# Patient Record
Sex: FEMALE | Race: WHITE | Employment: OTHER
[De-identification: names, ages, dates, MRNs, and addresses within clinical notes are randomized per-mention and may not be internally consistent; named-entity substitution may affect disease eponyms.]

---

## 2017-02-03 ENCOUNTER — OFFICE VISIT (OUTPATIENT)
Dept: FAMILY MEDICINE CLINIC | Facility: CLINIC | Age: 67
End: 2017-02-03

## 2017-02-03 VITALS
HEIGHT: 63 IN | DIASTOLIC BLOOD PRESSURE: 78 MMHG | BODY MASS INDEX: 36.64 KG/M2 | RESPIRATION RATE: 18 BRPM | WEIGHT: 206.8 LBS | HEART RATE: 68 BPM | SYSTOLIC BLOOD PRESSURE: 138 MMHG

## 2017-02-03 DIAGNOSIS — M85.80 OSTEOPENIA: Primary | ICD-10-CM

## 2017-02-03 DIAGNOSIS — Z11.59 NEED FOR HEPATITIS C SCREENING TEST: ICD-10-CM

## 2017-02-03 DIAGNOSIS — Z11.4 SCREENING FOR HIV WITHOUT PRESENCE OF RISK FACTORS: ICD-10-CM

## 2017-02-03 DIAGNOSIS — I10 ESSENTIAL HYPERTENSION, BENIGN: ICD-10-CM

## 2017-02-03 DIAGNOSIS — Z13.820 SCREENING FOR OSTEOPOROSIS: ICD-10-CM

## 2017-02-03 DIAGNOSIS — E78.00 PURE HYPERCHOLESTEROLEMIA: ICD-10-CM

## 2017-02-03 DIAGNOSIS — E11.9 DM TYPE 2, GOAL HBA1C < 7% (HCC): ICD-10-CM

## 2017-02-03 DIAGNOSIS — E28.39 ESTROGEN DEFICIENCY: ICD-10-CM

## 2017-02-03 DIAGNOSIS — Z00.00 ROUTINE GENERAL MEDICAL EXAMINATION AT A HEALTH CARE FACILITY: ICD-10-CM

## 2017-02-03 PROCEDURE — G0402 INITIAL PREVENTIVE EXAM: HCPCS | Performed by: NURSE PRACTITIONER

## 2017-02-03 RX ORDER — ATORVASTATIN CALCIUM 20 MG/1
20 TABLET, FILM COATED ORAL DAILY
Qty: 90 TABLET | Refills: 1 | Status: SHIPPED | OUTPATIENT
Start: 2017-02-03 | End: 2017-08-18 | Stop reason: SDUPTHER

## 2017-02-03 RX ORDER — LOSARTAN POTASSIUM AND HYDROCHLOROTHIAZIDE 25; 100 MG/1; MG/1
1 TABLET ORAL DAILY
Qty: 90 TABLET | Refills: 1 | Status: SHIPPED | OUTPATIENT
Start: 2017-02-03 | End: 2017-08-22 | Stop reason: SDUPTHER

## 2017-02-03 ASSESSMENT — LIFESTYLE VARIABLES
HOW MANY STANDARD DRINKS CONTAINING ALCOHOL DO YOU HAVE ON A TYPICAL DAY: 0
HAS A RELATIVE, FRIEND, DOCTOR, OR ANOTHER HEALTH PROFESSIONAL EXPRESSED CONCERN ABOUT YOUR DRINKING OR SUGGESTED YOU CUT DOWN: 0
HAVE YOU OR SOMEONE ELSE BEEN INJURED AS A RESULT OF YOUR DRINKING: 0
HOW OFTEN DO YOU HAVE A DRINK CONTAINING ALCOHOL: 1
HOW OFTEN DURING THE LAST YEAR HAVE YOU BEEN UNABLE TO REMEMBER WHAT HAPPENED THE NIGHT BEFORE BECAUSE YOU HAD BEEN DRINKING: 0
HOW OFTEN DURING THE LAST YEAR HAVE YOU NEEDED AN ALCOHOLIC DRINK FIRST THING IN THE MORNING TO GET YOURSELF GOING AFTER A NIGHT OF HEAVY DRINKING: 0
HOW OFTEN DO YOU HAVE SIX OR MORE DRINKS ON ONE OCCASION: 0
HOW OFTEN DURING THE LAST YEAR HAVE YOU FAILED TO DO WHAT WAS NORMALLY EXPECTED FROM YOU BECAUSE OF DRINKING: 0
AUDIT TOTAL SCORE: 1
AUDIT-C TOTAL SCORE: 1
HOW OFTEN DURING THE LAST YEAR HAVE YOU FOUND THAT YOU WERE NOT ABLE TO STOP DRINKING ONCE YOU HAD STARTED: 0
HOW OFTEN DURING THE LAST YEAR HAVE YOU HAD A FEELING OF GUILT OR REMORSE AFTER DRINKING: 0

## 2017-02-03 ASSESSMENT — ANXIETY QUESTIONNAIRES: GAD7 TOTAL SCORE: 3

## 2017-02-03 ASSESSMENT — PATIENT HEALTH QUESTIONNAIRE - PHQ9: SUM OF ALL RESPONSES TO PHQ QUESTIONS 1-9: 2

## 2017-03-01 ENCOUNTER — TELEPHONE (OUTPATIENT)
Dept: FAMILY MEDICINE CLINIC | Facility: CLINIC | Age: 67
End: 2017-03-01

## 2017-05-01 ENCOUNTER — OFFICE VISIT (OUTPATIENT)
Dept: FAMILY MEDICINE CLINIC | Age: 67
End: 2017-05-01
Payer: MEDICARE

## 2017-05-01 ENCOUNTER — HOSPITAL ENCOUNTER (OUTPATIENT)
Age: 67
Setting detail: SPECIMEN
Discharge: HOME | End: 2017-05-01
Payer: MEDICARE

## 2017-05-01 VITALS
HEIGHT: 63 IN | RESPIRATION RATE: 18 BRPM | HEART RATE: 72 BPM | BODY MASS INDEX: 36.68 KG/M2 | SYSTOLIC BLOOD PRESSURE: 128 MMHG | DIASTOLIC BLOOD PRESSURE: 80 MMHG | WEIGHT: 207 LBS

## 2017-05-01 DIAGNOSIS — E11.9 TYPE 2 DIABETES, HBA1C GOAL < 7% (HCC): Primary | ICD-10-CM

## 2017-05-01 DIAGNOSIS — F41.9 ANXIETY AND DEPRESSION: ICD-10-CM

## 2017-05-01 DIAGNOSIS — F32.A ANXIETY AND DEPRESSION: ICD-10-CM

## 2017-05-01 LAB
GLUCOSE BLD-MCNC: 187 MG/DL
HBA1C MFR BLD: 7.4 %

## 2017-05-01 PROCEDURE — 99213 OFFICE O/P EST LOW 20 MIN: CPT | Performed by: NURSE PRACTITIONER

## 2017-05-01 PROCEDURE — 82043 UR ALBUMIN QUANTITATIVE: CPT

## 2017-05-01 PROCEDURE — 82962 GLUCOSE BLOOD TEST: CPT | Performed by: NURSE PRACTITIONER

## 2017-05-01 PROCEDURE — 83036 HEMOGLOBIN GLYCOSYLATED A1C: CPT | Performed by: NURSE PRACTITIONER

## 2017-05-01 PROCEDURE — 82570 ASSAY OF URINE CREATININE: CPT

## 2017-05-01 RX ORDER — VENLAFAXINE HYDROCHLORIDE 75 MG/1
150 CAPSULE, EXTENDED RELEASE ORAL DAILY
Qty: 90 CAPSULE | Refills: 1 | Status: CANCELLED | OUTPATIENT
Start: 2017-05-01

## 2017-05-01 RX ORDER — VENLAFAXINE HYDROCHLORIDE 75 MG/1
75 CAPSULE, EXTENDED RELEASE ORAL DAILY
Qty: 90 CAPSULE | Refills: 1 | Status: SHIPPED | OUTPATIENT
Start: 2017-05-01 | End: 2017-10-31 | Stop reason: SDUPTHER

## 2017-05-01 ASSESSMENT — ENCOUNTER SYMPTOMS
SORE THROAT: 0
RHINORRHEA: 0
EYES NEGATIVE: 1
SHORTNESS OF BREATH: 0
ABDOMINAL DISTENTION: 0
CHEST TIGHTNESS: 0

## 2017-05-02 LAB
CREATININE URINE: 148.9 MG/DL (ref 28–217)
MICROALBUMIN/CREAT 24H UR: <12 MG/L
MICROALBUMIN/CREAT UR-RTO: 8 MCG/MG CREAT

## 2017-05-03 ENCOUNTER — TELEPHONE (OUTPATIENT)
Dept: FAMILY MEDICINE CLINIC | Age: 67
End: 2017-05-03

## 2017-05-09 ENCOUNTER — TELEPHONE (OUTPATIENT)
Dept: FAMILY MEDICINE CLINIC | Age: 67
End: 2017-05-09

## 2017-05-09 DIAGNOSIS — E11.9 TYPE 2 DIABETES, HBA1C GOAL < 7% (HCC): Primary | ICD-10-CM

## 2017-05-12 ENCOUNTER — TELEPHONE (OUTPATIENT)
Dept: FAMILY MEDICINE CLINIC | Age: 67
End: 2017-05-12

## 2017-05-12 DIAGNOSIS — E11.9 TYPE 2 DIABETES, HBA1C GOAL < 7% (HCC): Primary | ICD-10-CM

## 2017-05-12 DIAGNOSIS — E11.9 TYPE 2 DIABETES MELLITUS WITHOUT COMPLICATION, UNSPECIFIED LONG TERM INSULIN USE STATUS: ICD-10-CM

## 2017-07-20 ENCOUNTER — TELEPHONE (OUTPATIENT)
Dept: FAMILY MEDICINE CLINIC | Age: 67
End: 2017-07-20

## 2017-07-20 DIAGNOSIS — K14.4 SMOOTH TONGUE: ICD-10-CM

## 2017-07-20 DIAGNOSIS — Z79.899 ENCOUNTER FOR MONITORING STATIN THERAPY: ICD-10-CM

## 2017-07-20 DIAGNOSIS — I10 ESSENTIAL HYPERTENSION, BENIGN: ICD-10-CM

## 2017-07-20 DIAGNOSIS — E11.9 TYPE 2 DIABETES, HBA1C GOAL < 7% (HCC): Primary | ICD-10-CM

## 2017-07-20 DIAGNOSIS — Z13.0 SCREENING, ANEMIA, DEFICIENCY, IRON: ICD-10-CM

## 2017-07-20 DIAGNOSIS — E55.9 VITAMIN D DEFICIENCY: ICD-10-CM

## 2017-07-20 DIAGNOSIS — Z51.81 ENCOUNTER FOR MONITORING STATIN THERAPY: ICD-10-CM

## 2017-07-20 DIAGNOSIS — Z13.29 SCREENING FOR THYROID DISORDER: ICD-10-CM

## 2017-07-20 DIAGNOSIS — E78.00 PURE HYPERCHOLESTEROLEMIA: ICD-10-CM

## 2017-07-26 ENCOUNTER — HOSPITAL ENCOUNTER (OUTPATIENT)
Age: 67
Discharge: HOME | End: 2017-07-26
Payer: MEDICARE

## 2017-07-26 DIAGNOSIS — E11.9 TYPE 2 DIABETES, HBA1C GOAL < 7% (HCC): ICD-10-CM

## 2017-07-26 DIAGNOSIS — Z13.0 SCREENING, ANEMIA, DEFICIENCY, IRON: ICD-10-CM

## 2017-07-26 DIAGNOSIS — Z13.29 SCREENING FOR THYROID DISORDER: ICD-10-CM

## 2017-07-26 DIAGNOSIS — Z79.899 ENCOUNTER FOR MONITORING STATIN THERAPY: ICD-10-CM

## 2017-07-26 DIAGNOSIS — Z51.81 ENCOUNTER FOR MONITORING STATIN THERAPY: ICD-10-CM

## 2017-07-26 DIAGNOSIS — K14.4 SMOOTH TONGUE: ICD-10-CM

## 2017-07-26 DIAGNOSIS — E55.9 VITAMIN D DEFICIENCY: ICD-10-CM

## 2017-07-26 DIAGNOSIS — E78.00 PURE HYPERCHOLESTEROLEMIA: ICD-10-CM

## 2017-07-26 DIAGNOSIS — I10 ESSENTIAL HYPERTENSION, BENIGN: ICD-10-CM

## 2017-07-26 LAB
ABSOLUTE EOS #: 0.5 K/UL (ref 0–0.4)
ABSOLUTE LYMPH #: 2.5 K/UL (ref 1.1–2.7)
ABSOLUTE MONO #: 0.5 K/UL (ref 0–1)
ALBUMIN SERPL-MCNC: 3.8 G/DL (ref 3.5–5.2)
ALBUMIN/GLOBULIN RATIO: ABNORMAL (ref 1–2.5)
ALP BLD-CCNC: 113 U/L (ref 35–104)
ALT SERPL-CCNC: 15 U/L (ref 5–33)
ANION GAP SERPL CALCULATED.3IONS-SCNC: 16 MMOL/L (ref 9–17)
AST SERPL-CCNC: 13 U/L
BASOPHILS # BLD: 1 %
BASOPHILS ABSOLUTE: 0.1 K/UL (ref 0–0.2)
BILIRUB SERPL-MCNC: 0.23 MG/DL (ref 0.3–1.2)
BUN BLDV-MCNC: 17 MG/DL (ref 8–23)
BUN/CREAT BLD: 25 (ref 9–20)
CALCIUM SERPL-MCNC: 9.2 MG/DL (ref 8.6–10.4)
CHLORIDE BLD-SCNC: 100 MMOL/L (ref 98–107)
CHOLESTEROL/HDL RATIO: 3.9
CHOLESTEROL: 146 MG/DL
CO2: 24 MMOL/L (ref 20–31)
CREAT SERPL-MCNC: 0.68 MG/DL (ref 0.5–0.9)
DIFFERENTIAL TYPE: YES
EOSINOPHILS RELATIVE PERCENT: 5 %
ESTIMATED AVERAGE GLUCOSE: 146 MG/DL
FOLATE: 19.6 NG/ML
GFR AFRICAN AMERICAN: >60 ML/MIN
GFR NON-AFRICAN AMERICAN: >60 ML/MIN
GFR SERPL CREATININE-BSD FRML MDRD: ABNORMAL ML/MIN/{1.73_M2}
GFR SERPL CREATININE-BSD FRML MDRD: ABNORMAL ML/MIN/{1.73_M2}
GLUCOSE BLD-MCNC: 149 MG/DL (ref 70–99)
HBA1C MFR BLD: 6.7 % (ref 4.8–5.9)
HCT VFR BLD CALC: 37.8 % (ref 36–46)
HDLC SERPL-MCNC: 37 MG/DL
HEMOGLOBIN: 12.4 G/DL (ref 12–16)
LDL CHOLESTEROL: 83 MG/DL (ref 0–130)
LYMPHOCYTES # BLD: 23 %
MCH RBC QN AUTO: 25.5 PG (ref 26–34)
MCHC RBC AUTO-ENTMCNC: 32.9 G/DL (ref 31–37)
MCV RBC AUTO: 77.3 FL (ref 80–100)
MONOCYTES # BLD: 5 %
PATIENT FASTING?: YES
PDW BLD-RTO: 15.2 % (ref 12.1–15.2)
PLATELET # BLD: 337 K/UL (ref 140–450)
PLATELET ESTIMATE: ABNORMAL
PMV BLD AUTO: ABNORMAL FL (ref 6–12)
POTASSIUM SERPL-SCNC: 3.8 MMOL/L (ref 3.7–5.3)
RBC # BLD: 4.88 M/UL (ref 4–5.2)
RBC # BLD: ABNORMAL 10*6/UL
SEG NEUTROPHILS: 66 %
SEGMENTED NEUTROPHILS ABSOLUTE COUNT: 7.5 K/UL (ref 2.5–7)
SODIUM BLD-SCNC: 140 MMOL/L (ref 135–144)
TOTAL PROTEIN: 7 G/DL (ref 6.4–8.3)
TRIGL SERPL-MCNC: 132 MG/DL
TSH SERPL DL<=0.05 MIU/L-ACNC: 3.58 MIU/L (ref 0.3–5)
VITAMIN B-12: 406 PG/ML (ref 211–946)
VITAMIN D 25-HYDROXY: 14.2 NG/ML (ref 30–100)
VLDLC SERPL CALC-MCNC: ABNORMAL MG/DL (ref 1–30)
WBC # BLD: 11 K/UL (ref 3.5–11)
WBC # BLD: ABNORMAL 10*3/UL

## 2017-07-26 PROCEDURE — 84443 ASSAY THYROID STIM HORMONE: CPT

## 2017-07-26 PROCEDURE — 80053 COMPREHEN METABOLIC PANEL: CPT

## 2017-07-26 PROCEDURE — 82607 VITAMIN B-12: CPT

## 2017-07-26 PROCEDURE — 36415 COLL VENOUS BLD VENIPUNCTURE: CPT

## 2017-07-26 PROCEDURE — 85025 COMPLETE CBC W/AUTO DIFF WBC: CPT

## 2017-07-26 PROCEDURE — 82746 ASSAY OF FOLIC ACID SERUM: CPT

## 2017-07-26 PROCEDURE — 82306 VITAMIN D 25 HYDROXY: CPT

## 2017-07-26 PROCEDURE — 83036 HEMOGLOBIN GLYCOSYLATED A1C: CPT

## 2017-07-26 PROCEDURE — 80061 LIPID PANEL: CPT

## 2017-07-31 ENCOUNTER — OFFICE VISIT (OUTPATIENT)
Dept: FAMILY MEDICINE CLINIC | Age: 67
End: 2017-07-31
Payer: MEDICARE

## 2017-07-31 VITALS
RESPIRATION RATE: 18 BRPM | SYSTOLIC BLOOD PRESSURE: 130 MMHG | HEART RATE: 68 BPM | BODY MASS INDEX: 35.97 KG/M2 | HEIGHT: 63 IN | WEIGHT: 203 LBS | DIASTOLIC BLOOD PRESSURE: 70 MMHG

## 2017-07-31 DIAGNOSIS — I10 ESSENTIAL HYPERTENSION, BENIGN: ICD-10-CM

## 2017-07-31 DIAGNOSIS — E78.00 PURE HYPERCHOLESTEROLEMIA: ICD-10-CM

## 2017-07-31 DIAGNOSIS — Z12.31 ENCOUNTER FOR SCREENING MAMMOGRAM FOR BREAST CANCER: ICD-10-CM

## 2017-07-31 DIAGNOSIS — E11.9 TYPE 2 DIABETES, HBA1C GOAL < 7% (HCC): Primary | ICD-10-CM

## 2017-07-31 PROCEDURE — 99214 OFFICE O/P EST MOD 30 MIN: CPT | Performed by: NURSE PRACTITIONER

## 2017-07-31 ASSESSMENT — ENCOUNTER SYMPTOMS
EYES NEGATIVE: 1
BLURRED VISION: 0

## 2017-08-02 ENCOUNTER — HOSPITAL ENCOUNTER (OUTPATIENT)
Dept: DIABETES SERVICES | Age: 67
Discharge: HOME | End: 2017-08-02
Payer: MEDICARE

## 2017-08-02 VITALS
WEIGHT: 204.8 LBS | HEIGHT: 63 IN | BODY MASS INDEX: 36.29 KG/M2 | SYSTOLIC BLOOD PRESSURE: 139 MMHG | DIASTOLIC BLOOD PRESSURE: 84 MMHG

## 2017-08-02 PROCEDURE — G0108 DIAB MANAGE TRN  PER INDIV: HCPCS

## 2017-08-11 RX ORDER — LANCETS 30 GAUGE
EACH MISCELLANEOUS
Qty: 100 EACH | Refills: 3 | Status: SHIPPED | OUTPATIENT
Start: 2017-08-11 | End: 2018-08-27 | Stop reason: ALTCHOICE

## 2017-08-18 DIAGNOSIS — E78.00 PURE HYPERCHOLESTEROLEMIA: ICD-10-CM

## 2017-08-18 RX ORDER — ATORVASTATIN CALCIUM 20 MG/1
20 TABLET, FILM COATED ORAL DAILY
Qty: 90 TABLET | Refills: 1 | Status: SHIPPED | OUTPATIENT
Start: 2017-08-18 | End: 2018-01-29 | Stop reason: SDUPTHER

## 2017-08-22 DIAGNOSIS — I10 ESSENTIAL HYPERTENSION, BENIGN: ICD-10-CM

## 2017-08-22 RX ORDER — LOSARTAN POTASSIUM AND HYDROCHLOROTHIAZIDE 25; 100 MG/1; MG/1
1 TABLET ORAL DAILY
Qty: 90 TABLET | Refills: 1 | Status: SHIPPED | OUTPATIENT
Start: 2017-08-22 | End: 2018-01-29 | Stop reason: ALTCHOICE

## 2017-08-24 ENCOUNTER — HOSPITAL ENCOUNTER (OUTPATIENT)
Dept: MAMMOGRAPHY | Age: 67
Discharge: HOME | End: 2017-08-24
Payer: MEDICARE

## 2017-08-24 DIAGNOSIS — Z12.31 ENCOUNTER FOR SCREENING MAMMOGRAM FOR BREAST CANCER: Primary | ICD-10-CM

## 2017-08-24 DIAGNOSIS — Z12.31 ENCOUNTER FOR SCREENING MAMMOGRAM FOR BREAST CANCER: ICD-10-CM

## 2017-08-24 PROCEDURE — G0202 SCR MAMMO BI INCL CAD: HCPCS

## 2017-08-25 ENCOUNTER — TELEPHONE (OUTPATIENT)
Dept: FAMILY MEDICINE CLINIC | Age: 67
End: 2017-08-25

## 2017-08-25 ENCOUNTER — HOSPITAL ENCOUNTER (OUTPATIENT)
Dept: NUTRITION | Age: 67
Discharge: HOME | End: 2017-08-25
Payer: MEDICARE

## 2017-08-25 PROCEDURE — 97802 MEDICAL NUTRITION INDIV IN: CPT

## 2017-09-11 ENCOUNTER — HOSPITAL ENCOUNTER (OUTPATIENT)
Dept: DIABETES SERVICES | Age: 67
Discharge: HOME | End: 2017-09-11
Payer: MEDICARE

## 2017-09-11 VITALS — BODY MASS INDEX: 35.07 KG/M2 | DIASTOLIC BLOOD PRESSURE: 82 MMHG | WEIGHT: 198 LBS | SYSTOLIC BLOOD PRESSURE: 162 MMHG

## 2017-09-11 PROCEDURE — G0108 DIAB MANAGE TRN  PER INDIV: HCPCS

## 2017-10-31 ENCOUNTER — NURSE ONLY (OUTPATIENT)
Dept: FAMILY MEDICINE CLINIC | Age: 67
End: 2017-10-31
Payer: MEDICARE

## 2017-10-31 VITALS — WEIGHT: 191 LBS | HEIGHT: 63 IN | BODY MASS INDEX: 33.84 KG/M2

## 2017-10-31 DIAGNOSIS — F41.9 ANXIETY AND DEPRESSION: ICD-10-CM

## 2017-10-31 DIAGNOSIS — F32.A ANXIETY AND DEPRESSION: ICD-10-CM

## 2017-10-31 DIAGNOSIS — E11.9 TYPE 2 DIABETES, HBA1C GOAL < 7% (HCC): Primary | ICD-10-CM

## 2017-10-31 LAB — HBA1C MFR BLD: 6.2 %

## 2017-10-31 PROCEDURE — 83036 HEMOGLOBIN GLYCOSYLATED A1C: CPT | Performed by: NURSE PRACTITIONER

## 2017-10-31 RX ORDER — VENLAFAXINE HYDROCHLORIDE 75 MG/1
75 CAPSULE, EXTENDED RELEASE ORAL DAILY
Qty: 90 CAPSULE | Refills: 1 | Status: SHIPPED | OUTPATIENT
Start: 2017-10-31 | End: 2018-01-29 | Stop reason: SDUPTHER

## 2017-12-12 DIAGNOSIS — E11.9 TYPE 2 DIABETES MELLITUS WITHOUT COMPLICATION, UNSPECIFIED LONG TERM INSULIN USE STATUS: ICD-10-CM

## 2017-12-12 NOTE — TELEPHONE ENCOUNTER
From: Isac Norris  Sent: 12/12/2017 11:54 AM EST  Subject: Medication Renewal Request    Isac Norris would like a refill of the following medications:  metFORMIN (GLUCOPHAGE) 1000 MG tablet Donna Wu NP]    Preferred pharmacy: [facility] Euell Landau, [address] [place] [address] [phone] Filomena Mira [phone]    Comment:  . I need my metformin refilled next week so I have it over Sissy. . happy new year!...... thank you. ...janette Sheehan and happy new year!

## 2018-01-25 ENCOUNTER — HOSPITAL ENCOUNTER (OUTPATIENT)
Age: 68
Discharge: HOME | End: 2018-01-25
Payer: MEDICARE

## 2018-01-25 DIAGNOSIS — E11.9 TYPE 2 DIABETES, HBA1C GOAL < 7% (HCC): ICD-10-CM

## 2018-01-25 DIAGNOSIS — I10 ESSENTIAL HYPERTENSION, BENIGN: ICD-10-CM

## 2018-01-25 DIAGNOSIS — E78.00 PURE HYPERCHOLESTEROLEMIA: ICD-10-CM

## 2018-01-25 LAB
ANION GAP SERPL CALCULATED.3IONS-SCNC: 15 MMOL/L (ref 9–17)
BUN BLDV-MCNC: 19 MG/DL (ref 8–23)
BUN/CREAT BLD: 26 (ref 9–20)
CALCIUM SERPL-MCNC: 9.6 MG/DL (ref 8.6–10.4)
CHLORIDE BLD-SCNC: 100 MMOL/L (ref 98–107)
CHOLESTEROL/HDL RATIO: 4.4
CHOLESTEROL: 157 MG/DL
CO2: 24 MMOL/L (ref 20–31)
CREAT SERPL-MCNC: 0.72 MG/DL (ref 0.5–0.9)
ESTIMATED AVERAGE GLUCOSE: 137 MG/DL
GFR AFRICAN AMERICAN: >60 ML/MIN
GFR NON-AFRICAN AMERICAN: >60 ML/MIN
GFR SERPL CREATININE-BSD FRML MDRD: ABNORMAL ML/MIN/{1.73_M2}
GFR SERPL CREATININE-BSD FRML MDRD: ABNORMAL ML/MIN/{1.73_M2}
GLUCOSE BLD-MCNC: 152 MG/DL (ref 70–99)
HBA1C MFR BLD: 6.4 % (ref 4.8–5.9)
HDLC SERPL-MCNC: 36 MG/DL
LDL CHOLESTEROL: 90 MG/DL (ref 0–130)
PATIENT FASTING?: YES
POTASSIUM SERPL-SCNC: 3.6 MMOL/L (ref 3.7–5.3)
SODIUM BLD-SCNC: 139 MMOL/L (ref 135–144)
TRIGL SERPL-MCNC: 156 MG/DL
VLDLC SERPL CALC-MCNC: ABNORMAL MG/DL (ref 1–30)

## 2018-01-25 PROCEDURE — 80048 BASIC METABOLIC PNL TOTAL CA: CPT

## 2018-01-25 PROCEDURE — 36415 COLL VENOUS BLD VENIPUNCTURE: CPT

## 2018-01-25 PROCEDURE — 80061 LIPID PANEL: CPT

## 2018-01-25 PROCEDURE — 83036 HEMOGLOBIN GLYCOSYLATED A1C: CPT

## 2018-01-26 DIAGNOSIS — I10 ESSENTIAL HYPERTENSION: ICD-10-CM

## 2018-01-26 DIAGNOSIS — E87.6 HYPOKALEMIA: Primary | ICD-10-CM

## 2018-01-27 ENCOUNTER — HOSPITAL ENCOUNTER (OUTPATIENT)
Age: 68
Discharge: HOME | End: 2018-01-27
Payer: MEDICARE

## 2018-01-27 DIAGNOSIS — E87.6 HYPOKALEMIA: ICD-10-CM

## 2018-01-27 DIAGNOSIS — I10 ESSENTIAL HYPERTENSION: ICD-10-CM

## 2018-01-27 LAB
EKG ATRIAL RATE: 94 BPM
EKG P AXIS: 59 DEGREES
EKG P-R INTERVAL: 142 MS
EKG Q-T INTERVAL: 370 MS
EKG QRS DURATION: 96 MS
EKG QTC CALCULATION (BAZETT): 462 MS
EKG R AXIS: -37 DEGREES
EKG T AXIS: 30 DEGREES
EKG VENTRICULAR RATE: 94 BPM

## 2018-01-27 PROCEDURE — 93005 ELECTROCARDIOGRAM TRACING: CPT

## 2018-01-29 ENCOUNTER — OFFICE VISIT (OUTPATIENT)
Dept: FAMILY MEDICINE CLINIC | Age: 68
End: 2018-01-29
Payer: MEDICARE

## 2018-01-29 VITALS
OXYGEN SATURATION: 98 % | HEIGHT: 63 IN | HEART RATE: 106 BPM | BODY MASS INDEX: 34.38 KG/M2 | RESPIRATION RATE: 18 BRPM | SYSTOLIC BLOOD PRESSURE: 126 MMHG | DIASTOLIC BLOOD PRESSURE: 70 MMHG | WEIGHT: 194 LBS

## 2018-01-29 DIAGNOSIS — E11.9 TYPE 2 DIABETES MELLITUS WITHOUT COMPLICATION, UNSPECIFIED LONG TERM INSULIN USE STATUS: ICD-10-CM

## 2018-01-29 DIAGNOSIS — E78.00 PURE HYPERCHOLESTEROLEMIA: ICD-10-CM

## 2018-01-29 DIAGNOSIS — J01.00 ACUTE NON-RECURRENT MAXILLARY SINUSITIS: ICD-10-CM

## 2018-01-29 DIAGNOSIS — K14.4 SMOOTH TONGUE: ICD-10-CM

## 2018-01-29 DIAGNOSIS — Z51.81 ENCOUNTER FOR MONITORING ACE-INHIBITOR THERAPY: ICD-10-CM

## 2018-01-29 DIAGNOSIS — Z23 NEED FOR INFLUENZA VACCINATION: ICD-10-CM

## 2018-01-29 DIAGNOSIS — I10 ESSENTIAL HYPERTENSION, BENIGN: Primary | ICD-10-CM

## 2018-01-29 DIAGNOSIS — Z79.899 ENCOUNTER FOR MONITORING ACE-INHIBITOR THERAPY: ICD-10-CM

## 2018-01-29 DIAGNOSIS — E87.6 HYPOKALEMIA: ICD-10-CM

## 2018-01-29 DIAGNOSIS — F41.9 ANXIETY AND DEPRESSION: ICD-10-CM

## 2018-01-29 DIAGNOSIS — F32.A ANXIETY AND DEPRESSION: ICD-10-CM

## 2018-01-29 PROCEDURE — G8427 DOCREV CUR MEDS BY ELIG CLIN: HCPCS | Performed by: NURSE PRACTITIONER

## 2018-01-29 PROCEDURE — 99214 OFFICE O/P EST MOD 30 MIN: CPT | Performed by: NURSE PRACTITIONER

## 2018-01-29 PROCEDURE — G8484 FLU IMMUNIZE NO ADMIN: HCPCS | Performed by: NURSE PRACTITIONER

## 2018-01-29 PROCEDURE — G8417 CALC BMI ABV UP PARAM F/U: HCPCS | Performed by: NURSE PRACTITIONER

## 2018-01-29 PROCEDURE — 4040F PNEUMOC VAC/ADMIN/RCVD: CPT | Performed by: NURSE PRACTITIONER

## 2018-01-29 PROCEDURE — G0008 ADMIN INFLUENZA VIRUS VAC: HCPCS | Performed by: NURSE PRACTITIONER

## 2018-01-29 PROCEDURE — 1123F ACP DISCUSS/DSCN MKR DOCD: CPT | Performed by: NURSE PRACTITIONER

## 2018-01-29 PROCEDURE — 3014F SCREEN MAMMO DOC REV: CPT | Performed by: NURSE PRACTITIONER

## 2018-01-29 PROCEDURE — 3044F HG A1C LEVEL LT 7.0%: CPT | Performed by: NURSE PRACTITIONER

## 2018-01-29 PROCEDURE — 1090F PRES/ABSN URINE INCON ASSESS: CPT | Performed by: NURSE PRACTITIONER

## 2018-01-29 PROCEDURE — 90686 IIV4 VACC NO PRSV 0.5 ML IM: CPT | Performed by: NURSE PRACTITIONER

## 2018-01-29 PROCEDURE — G8399 PT W/DXA RESULTS DOCUMENT: HCPCS | Performed by: NURSE PRACTITIONER

## 2018-01-29 PROCEDURE — 3017F COLORECTAL CA SCREEN DOC REV: CPT | Performed by: NURSE PRACTITIONER

## 2018-01-29 PROCEDURE — 1036F TOBACCO NON-USER: CPT | Performed by: NURSE PRACTITIONER

## 2018-01-29 RX ORDER — LOSARTAN POTASSIUM AND HYDROCHLOROTHIAZIDE 25; 100 MG/1; MG/1
1 TABLET ORAL DAILY
Qty: 90 TABLET | Refills: 1 | Status: CANCELLED | OUTPATIENT
Start: 2018-01-29

## 2018-01-29 RX ORDER — MONTELUKAST SODIUM 10 MG/1
10 TABLET ORAL DAILY
Qty: 30 TABLET | Refills: 0 | Status: SHIPPED | OUTPATIENT
Start: 2018-01-29 | End: 2018-04-30 | Stop reason: SDUPTHER

## 2018-01-29 RX ORDER — AZITHROMYCIN 250 MG/1
TABLET, FILM COATED ORAL
Qty: 1 PACKET | Refills: 0 | Status: SHIPPED | OUTPATIENT
Start: 2018-01-29 | End: 2018-02-08

## 2018-01-29 RX ORDER — ATORVASTATIN CALCIUM 20 MG/1
20 TABLET, FILM COATED ORAL DAILY
Qty: 90 TABLET | Refills: 1 | Status: SHIPPED | OUTPATIENT
Start: 2018-01-29 | End: 2018-08-20 | Stop reason: SDUPTHER

## 2018-01-29 RX ORDER — VENLAFAXINE HYDROCHLORIDE 75 MG/1
75 CAPSULE, EXTENDED RELEASE ORAL DAILY
Qty: 90 CAPSULE | Refills: 1 | Status: SHIPPED | OUTPATIENT
Start: 2018-01-29 | End: 2018-04-30 | Stop reason: SDUPTHER

## 2018-01-29 RX ORDER — LOSARTAN POTASSIUM 100 MG/1
100 TABLET ORAL DAILY
Qty: 30 TABLET | Refills: 1 | Status: SHIPPED | OUTPATIENT
Start: 2018-01-29 | End: 2018-04-13 | Stop reason: SDUPTHER

## 2018-01-29 RX ORDER — AMLODIPINE BESYLATE 5 MG/1
5 TABLET ORAL DAILY
Qty: 30 TABLET | Refills: 1 | Status: SHIPPED | OUTPATIENT
Start: 2018-01-29 | End: 2018-04-13 | Stop reason: SDUPTHER

## 2018-01-29 ASSESSMENT — ENCOUNTER SYMPTOMS
SINUS PRESSURE: 1
SHORTNESS OF BREATH: 0
VOICE CHANGE: 0
SORE THROAT: 0
COUGH: 0
RHINORRHEA: 1
CHEST TIGHTNESS: 0
TROUBLE SWALLOWING: 0
EYES NEGATIVE: 1

## 2018-01-29 NOTE — PROGRESS NOTES
Jossy Peña is c/o of Check-Up (6 month check up)      Diabetes   She presents for her follow-up diabetic visit. She has type 2 diabetes mellitus. Pertinent negatives for hypoglycemia include no dizziness or headaches. Pertinent negatives for diabetes include no chest pain, no polydipsia, no polyphagia and no polyuria. There are no hypoglycemic complications. Risk factors for coronary artery disease include dyslipidemia, diabetes mellitus, hypertension, obesity, stress and sedentary lifestyle. Current diabetic treatment includes oral agent (monotherapy). She is compliant with treatment all of the time. Her weight is stable. She participates in exercise intermittently. Her breakfast blood glucose range is generally 110-130 mg/dl. She does not see a podiatrist.Eye exam current: appt tomorrow. Osteopenia-calcium and vitamin D3 taken  Hx hysterectomy    Dexa 2/27/17  Impression       Osteopenia in the femoral necks is not significantly changed from 2011 with a    T-score of -1.9.           Hypertension-no headaches, no flushing, no chest pain. Losartan-hydrochlorothiazide   due to chronically low normal potassium level will adjust meds with stable EKG to losartan plain and amlodipine low dose. Lab Results   Component Value Date     01/25/2018    K 3.6 01/25/2018     01/25/2018    CO2 24 01/25/2018    BUN 19 01/25/2018    CREATININE 0.72 01/25/2018    GLUCOSE 152 01/25/2018    GLUCOSE 179 04/12/2012    CALCIUM 9.6 01/25/2018        Hyperlipidemia-lipitor being taken without side effects. Lab Results   Component Value Date    LDLCHOLESTEROL 90 01/25/2018       Anxiety and depression- coping okay, getting through holidays' lost  this last year. Granddaughter tearful at times. On effexor    Flu vaccine today given     type 2 DM 1000 mg po bid. C/o sinus headaches a lot and worse since swapping homes with son. A lot carpet , has animal in home poodle.    Hx tried zyrtec and claritin in past tablet Take 81 mg by mouth daily.  Lancets MISC One Touch Delica Lancets 120 each 3    glucose blood VI test strips (ASCENSIA AUTODISC VI;ONE TOUCH ULTRA TEST VI) strip Test sugar Bid and as needed 100 each 3    triamcinolone (KENALOG) 0.1 % cream Apply topically daily on psoriasis on scalp /forehead 60 g 0    Blood Glucose Monitoring Suppl (BLOOD GLUCOSE METER) KIT Test blood sugar once daily and as needed. Dispense Accu chek Airam 1 kit 0     No current facility-administered medications for this visit. Allergies   Allergen Reactions    Penicillins Hives    Dust Mite Extract Other (See Comments)     headache       Health Maintenance   Topic Date Due    DTaP/Tdap/Td vaccine (1 - Tdap) 06/19/1970    Flu vaccine (1) 09/01/2017    Diabetic retinal exam  11/29/2017    Diabetic microalbuminuria test  05/01/2018    Diabetic foot exam  07/31/2018    A1C test (Diabetic or Prediabetic)  01/25/2019    Lipid screen  01/25/2019    Potassium monitoring  01/25/2019    Creatinine monitoring  01/25/2019    Pneumococcal low/med risk (2 of 2 - PPSV23) 03/31/2019    Breast cancer screen  08/24/2019    Colon cancer screen colonoscopy  09/12/2026    Zostavax vaccine  Completed    DEXA (modify frequency per FRAX score)  Completed    Hepatitis C screen  Completed       Subjective:      Review of Systems   Constitutional: Negative for activity change, appetite change, chills and fever. HENT: Positive for postnasal drip, rhinorrhea, sinus pressure and sneezing. Negative for congestion, ear pain, nosebleeds, sore throat, trouble swallowing and voice change. Eyes: Negative. Respiratory: Negative for cough, chest tightness and shortness of breath. Cardiovascular: Negative for chest pain. Endocrine: Negative for cold intolerance, polydipsia, polyphagia and polyuria. Genitourinary: Negative for difficulty urinating. Skin: Negative for rash. Neurological: Negative for dizziness and headaches. CHOL 157 01/25/2018    HDL 36 01/25/2018    LABA1C 6.4 01/25/2018          Assessment & Plan       1. Essential hypertension, benign  Stable but with recurrent hypokalemia will stop water pill HCTZ and change to losartan 100mg  With amlodipine 5 mg daily. bp checks twice a week for 2 weeks with change has about 1 week of other med left Hyzaar will finish prior to change. 2. Pure hypercholesterolemia  Continue statin    3. Anxiety and depression  Continue effexor    4. Type 2 diabetes mellitus without complication, unspecified long term insulin use status ([facility] [address]  Lab Results   Component Value Date    LABA1C 6.4 (H) 01/25/2018     Lab Results   Component Value Date     01/25/2018     Stable remains on metformin. 5. Need for influenza vaccination  Given today  - INFLUENZA, QUADV, 3 YRS AND OLDER, IM, PF, PREFILL SYR OR SDV, 0.5ML (FLUZONE QUADV, PF)    6. Encounter for monitoring ACE-inhibitor therapy  Repeat labs in 1 month after stopping HCTZ    - Basic Metabolic Panel; Future  - Magnesium; Future    7. Hypokalemia  Should recover with stopping water pill    - Magnesium; Future  - Vitamin B12 & Folate; Future    8. Smooth tongue    - Vitamin B12 & Folate; Future    9. Acute non-recurrent maxillary sinusitis  Good hydration  Will also start singulair with allergies bad since changing homes with son (much more carpet in home)    - azithromycin (ZITHROMAX Z-ONEL) 250 MG tablet; Two tablets by mouth the first day  then one tablet daily for 4 days. Dispense: 1 packet; Refill: 0        See in 6 months  No restrictions with needed cataract eye surgery, just good bp control with adjusting meds.      glyco in 3 months              Completed Refills   Requested Prescriptions     Pending Prescriptions Disp Refills    losartan-hydrochlorothiazide (HYZAAR) 100-25 MG per tablet 90 tablet 1     Sig: Take 1 tablet by mouth daily    atorvastatin (LIPITOR) 20 MG tablet 90 tablet 1     Sig: Take 1 tablet by mouth

## 2018-03-08 ENCOUNTER — TELEPHONE (OUTPATIENT)
Dept: FAMILY MEDICINE CLINIC | Age: 68
End: 2018-03-08

## 2018-03-09 NOTE — TELEPHONE ENCOUNTER
Inform patient sometimes this can happen but with potassium dropping down is why we took out the water pill and suspect bp has been good correct. If just a little in evening I'm okay but if it does not go away by morning or increases more consistently let me know.

## 2018-04-13 RX ORDER — AMLODIPINE BESYLATE 5 MG/1
5 TABLET ORAL DAILY
Qty: 30 TABLET | Refills: 1 | Status: SHIPPED | OUTPATIENT
Start: 2018-04-13 | End: 2018-04-30 | Stop reason: SDUPTHER

## 2018-04-13 RX ORDER — LOSARTAN POTASSIUM 100 MG/1
100 TABLET ORAL DAILY
Qty: 30 TABLET | Refills: 1 | Status: SHIPPED | OUTPATIENT
Start: 2018-04-13 | End: 2018-04-30 | Stop reason: SDUPTHER

## 2018-04-26 ENCOUNTER — HOSPITAL ENCOUNTER (OUTPATIENT)
Age: 68
Discharge: HOME | End: 2018-04-26
Payer: MEDICARE

## 2018-04-26 DIAGNOSIS — Z79.899 ENCOUNTER FOR MONITORING ACE-INHIBITOR THERAPY: ICD-10-CM

## 2018-04-26 DIAGNOSIS — Z51.81 ENCOUNTER FOR MONITORING ACE-INHIBITOR THERAPY: ICD-10-CM

## 2018-04-26 DIAGNOSIS — E87.6 HYPOKALEMIA: ICD-10-CM

## 2018-04-26 DIAGNOSIS — I10 ESSENTIAL HYPERTENSION, BENIGN: ICD-10-CM

## 2018-04-26 DIAGNOSIS — K14.4 SMOOTH TONGUE: ICD-10-CM

## 2018-04-26 LAB
ANION GAP SERPL CALCULATED.3IONS-SCNC: 13 MMOL/L (ref 9–17)
BUN BLDV-MCNC: 14 MG/DL (ref 8–23)
BUN/CREAT BLD: 22 (ref 9–20)
CALCIUM SERPL-MCNC: 9.5 MG/DL (ref 8.6–10.4)
CHLORIDE BLD-SCNC: 100 MMOL/L (ref 98–107)
CO2: 24 MMOL/L (ref 20–31)
CREAT SERPL-MCNC: 0.65 MG/DL (ref 0.5–0.9)
FOLATE: >20 NG/ML
GFR AFRICAN AMERICAN: >60 ML/MIN
GFR NON-AFRICAN AMERICAN: >60 ML/MIN
GFR SERPL CREATININE-BSD FRML MDRD: ABNORMAL ML/MIN/{1.73_M2}
GFR SERPL CREATININE-BSD FRML MDRD: ABNORMAL ML/MIN/{1.73_M2}
GLUCOSE BLD-MCNC: 141 MG/DL (ref 70–99)
MAGNESIUM: 1.9 MG/DL (ref 1.6–2.6)
POTASSIUM SERPL-SCNC: 4.2 MMOL/L (ref 3.7–5.3)
SODIUM BLD-SCNC: 137 MMOL/L (ref 135–144)
VITAMIN B-12: 433 PG/ML (ref 232–1245)

## 2018-04-26 PROCEDURE — 80048 BASIC METABOLIC PNL TOTAL CA: CPT

## 2018-04-26 PROCEDURE — 83735 ASSAY OF MAGNESIUM: CPT

## 2018-04-26 PROCEDURE — 82607 VITAMIN B-12: CPT

## 2018-04-26 PROCEDURE — 36415 COLL VENOUS BLD VENIPUNCTURE: CPT

## 2018-04-26 PROCEDURE — 82746 ASSAY OF FOLIC ACID SERUM: CPT

## 2018-04-30 ENCOUNTER — HOSPITAL ENCOUNTER (OUTPATIENT)
Age: 68
Setting detail: SPECIMEN
Discharge: HOME | End: 2018-04-30
Payer: MEDICARE

## 2018-04-30 ENCOUNTER — OFFICE VISIT (OUTPATIENT)
Dept: FAMILY MEDICINE CLINIC | Age: 68
End: 2018-04-30
Payer: MEDICARE

## 2018-04-30 VITALS
HEIGHT: 63 IN | RESPIRATION RATE: 18 BRPM | BODY MASS INDEX: 34.73 KG/M2 | SYSTOLIC BLOOD PRESSURE: 130 MMHG | OXYGEN SATURATION: 98 % | WEIGHT: 196 LBS | HEART RATE: 99 BPM | DIASTOLIC BLOOD PRESSURE: 70 MMHG

## 2018-04-30 DIAGNOSIS — F32.A ANXIETY AND DEPRESSION: ICD-10-CM

## 2018-04-30 DIAGNOSIS — E11.9 TYPE 2 DIABETES MELLITUS WITHOUT COMPLICATION, WITHOUT LONG-TERM CURRENT USE OF INSULIN (HCC): Primary | ICD-10-CM

## 2018-04-30 DIAGNOSIS — Z91.09 ENVIRONMENTAL ALLERGIES: ICD-10-CM

## 2018-04-30 DIAGNOSIS — E11.9 TYPE 2 DIABETES MELLITUS WITHOUT COMPLICATION, WITHOUT LONG-TERM CURRENT USE OF INSULIN (HCC): ICD-10-CM

## 2018-04-30 DIAGNOSIS — G63 POLYNEUROPATHY ASSOCIATED WITH UNDERLYING DISEASE (HCC): ICD-10-CM

## 2018-04-30 DIAGNOSIS — F41.9 ANXIETY AND DEPRESSION: ICD-10-CM

## 2018-04-30 DIAGNOSIS — I10 ESSENTIAL HYPERTENSION, BENIGN: ICD-10-CM

## 2018-04-30 DIAGNOSIS — E11.9 TYPE 2 DIABETES MELLITUS WITHOUT COMPLICATION, UNSPECIFIED LONG TERM INSULIN USE STATUS: ICD-10-CM

## 2018-04-30 LAB
CREATININE URINE: 191.9 MG/DL (ref 28–217)
MICROALBUMIN/CREAT 24H UR: 13 MG/L
MICROALBUMIN/CREAT UR-RTO: 7 MCG/MG CREAT

## 2018-04-30 PROCEDURE — 3044F HG A1C LEVEL LT 7.0%: CPT | Performed by: NURSE PRACTITIONER

## 2018-04-30 PROCEDURE — 2022F DILAT RTA XM EVC RTNOPTHY: CPT | Performed by: NURSE PRACTITIONER

## 2018-04-30 PROCEDURE — G8399 PT W/DXA RESULTS DOCUMENT: HCPCS | Performed by: NURSE PRACTITIONER

## 2018-04-30 PROCEDURE — 99214 OFFICE O/P EST MOD 30 MIN: CPT | Performed by: NURSE PRACTITIONER

## 2018-04-30 PROCEDURE — G8427 DOCREV CUR MEDS BY ELIG CLIN: HCPCS | Performed by: NURSE PRACTITIONER

## 2018-04-30 PROCEDURE — 3017F COLORECTAL CA SCREEN DOC REV: CPT | Performed by: NURSE PRACTITIONER

## 2018-04-30 PROCEDURE — 1123F ACP DISCUSS/DSCN MKR DOCD: CPT | Performed by: NURSE PRACTITIONER

## 2018-04-30 PROCEDURE — 4040F PNEUMOC VAC/ADMIN/RCVD: CPT | Performed by: NURSE PRACTITIONER

## 2018-04-30 PROCEDURE — 1036F TOBACCO NON-USER: CPT | Performed by: NURSE PRACTITIONER

## 2018-04-30 PROCEDURE — 82043 UR ALBUMIN QUANTITATIVE: CPT

## 2018-04-30 PROCEDURE — G8417 CALC BMI ABV UP PARAM F/U: HCPCS | Performed by: NURSE PRACTITIONER

## 2018-04-30 PROCEDURE — 1090F PRES/ABSN URINE INCON ASSESS: CPT | Performed by: NURSE PRACTITIONER

## 2018-04-30 PROCEDURE — 82570 ASSAY OF URINE CREATININE: CPT

## 2018-04-30 RX ORDER — MONTELUKAST SODIUM 10 MG/1
10 TABLET ORAL DAILY
Qty: 30 TABLET | Refills: 2 | Status: SHIPPED | OUTPATIENT
Start: 2018-04-30 | End: 2018-06-11 | Stop reason: SDUPTHER

## 2018-04-30 RX ORDER — AMLODIPINE BESYLATE 5 MG/1
5 TABLET ORAL DAILY
Qty: 90 TABLET | Refills: 1 | Status: SHIPPED | OUTPATIENT
Start: 2018-04-30 | End: 2018-06-11 | Stop reason: SDUPTHER

## 2018-04-30 RX ORDER — GABAPENTIN 100 MG/1
100 CAPSULE ORAL DAILY
Qty: 30 CAPSULE | Refills: 1 | Status: SHIPPED | OUTPATIENT
Start: 2018-04-30 | End: 2018-08-27 | Stop reason: ALTCHOICE

## 2018-04-30 RX ORDER — VENLAFAXINE HYDROCHLORIDE 75 MG/1
75 CAPSULE, EXTENDED RELEASE ORAL DAILY
Qty: 90 CAPSULE | Refills: 1 | Status: SHIPPED | OUTPATIENT
Start: 2018-04-30 | End: 2018-08-20 | Stop reason: SDUPTHER

## 2018-04-30 RX ORDER — LOSARTAN POTASSIUM 100 MG/1
100 TABLET ORAL DAILY
Qty: 90 TABLET | Refills: 1 | Status: SHIPPED | OUTPATIENT
Start: 2018-04-30 | End: 2018-06-11 | Stop reason: SDUPTHER

## 2018-04-30 ASSESSMENT — ENCOUNTER SYMPTOMS
VISUAL CHANGE: 0
BLURRED VISION: 0

## 2018-04-30 ASSESSMENT — PATIENT HEALTH QUESTIONNAIRE - PHQ9
1. LITTLE INTEREST OR PLEASURE IN DOING THINGS: 0
SUM OF ALL RESPONSES TO PHQ QUESTIONS 1-9: 1
2. FEELING DOWN, DEPRESSED OR HOPELESS: 1
SUM OF ALL RESPONSES TO PHQ9 QUESTIONS 1 & 2: 1

## 2018-06-06 DIAGNOSIS — Z91.09 ENVIRONMENTAL ALLERGIES: ICD-10-CM

## 2018-06-06 RX ORDER — MONTELUKAST SODIUM 10 MG/1
10 TABLET ORAL DAILY
Qty: 30 TABLET | Refills: 2 | Status: CANCELLED | OUTPATIENT
Start: 2018-06-06

## 2018-06-06 RX ORDER — LOSARTAN POTASSIUM 100 MG/1
100 TABLET ORAL DAILY
Qty: 90 TABLET | Refills: 1 | Status: CANCELLED | OUTPATIENT
Start: 2018-06-06

## 2018-06-06 RX ORDER — AMLODIPINE BESYLATE 5 MG/1
5 TABLET ORAL DAILY
Qty: 90 TABLET | Refills: 1 | Status: CANCELLED | OUTPATIENT
Start: 2018-06-06

## 2018-06-11 ENCOUNTER — TELEPHONE (OUTPATIENT)
Dept: FAMILY MEDICINE CLINIC | Age: 68
End: 2018-06-11

## 2018-06-11 DIAGNOSIS — Z91.09 ENVIRONMENTAL ALLERGIES: ICD-10-CM

## 2018-06-11 RX ORDER — LOSARTAN POTASSIUM 100 MG/1
100 TABLET ORAL DAILY
Qty: 90 TABLET | Refills: 1 | Status: SHIPPED | OUTPATIENT
Start: 2018-06-11 | End: 2018-12-13 | Stop reason: SDUPTHER

## 2018-06-11 RX ORDER — AMLODIPINE BESYLATE 5 MG/1
5 TABLET ORAL DAILY
Qty: 90 TABLET | Refills: 1 | Status: SHIPPED | OUTPATIENT
Start: 2018-06-11 | End: 2018-12-13 | Stop reason: SDUPTHER

## 2018-06-11 RX ORDER — MONTELUKAST SODIUM 10 MG/1
10 TABLET ORAL DAILY
Qty: 90 TABLET | Refills: 1 | Status: SHIPPED | OUTPATIENT
Start: 2018-06-11 | End: 2019-02-27 | Stop reason: ALTCHOICE

## 2018-06-22 ENCOUNTER — HOSPITAL ENCOUNTER (OUTPATIENT)
Dept: GENERAL RADIOLOGY | Age: 68
Discharge: HOME | End: 2018-06-24
Payer: MEDICARE

## 2018-06-22 ENCOUNTER — HOSPITAL ENCOUNTER (OUTPATIENT)
Age: 68
Discharge: HOME | End: 2018-06-24
Payer: MEDICARE

## 2018-06-22 ENCOUNTER — HOSPITAL ENCOUNTER (OUTPATIENT)
Age: 68
Discharge: HOME | End: 2018-06-22
Payer: MEDICARE

## 2018-06-22 ENCOUNTER — OFFICE VISIT (OUTPATIENT)
Dept: FAMILY MEDICINE CLINIC | Age: 68
End: 2018-06-22
Payer: MEDICARE

## 2018-06-22 VITALS
OXYGEN SATURATION: 97 % | SYSTOLIC BLOOD PRESSURE: 120 MMHG | DIASTOLIC BLOOD PRESSURE: 74 MMHG | BODY MASS INDEX: 36.31 KG/M2 | WEIGHT: 205 LBS | HEART RATE: 84 BPM

## 2018-06-22 DIAGNOSIS — S93.402A MODERATE LEFT ANKLE SPRAIN, INITIAL ENCOUNTER: ICD-10-CM

## 2018-06-22 DIAGNOSIS — M79.89 LEFT LEG SWELLING: ICD-10-CM

## 2018-06-22 DIAGNOSIS — S80.02XA CONTUSION OF KNEE AND LOWER LEG, LEFT, INITIAL ENCOUNTER: ICD-10-CM
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
## 2018-06-22 DIAGNOSIS — S93.432A SPRAIN OF TIBIOFIBULAR LIGAMENT OF LEFT ANKLE, INITIAL ENCOUNTER: ICD-10-CM
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
## 2018-06-22 DIAGNOSIS — Z91.81 AT HIGH RISK FOR FALLS: ICD-10-CM

## 2018-06-22 DIAGNOSIS — S80.12XA CONTUSION OF KNEE AND LOWER LEG, LEFT, INITIAL ENCOUNTER: ICD-10-CM
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
## 2018-06-22 LAB — D-DIMER QUANTITATIVE: 0.77 MG/L FEU (ref 0–0.5)

## 2018-06-22 PROCEDURE — G8417 CALC BMI ABV UP PARAM F/U: HCPCS | Performed by: NURSE PRACTITIONER

## 2018-06-22 PROCEDURE — 1123F ACP DISCUSS/DSCN MKR DOCD: CPT | Performed by: NURSE PRACTITIONER

## 2018-06-22 PROCEDURE — G8399 PT W/DXA RESULTS DOCUMENT: HCPCS | Performed by: NURSE PRACTITIONER

## 2018-06-22 PROCEDURE — 4040F PNEUMOC VAC/ADMIN/RCVD: CPT | Performed by: NURSE PRACTITIONER

## 2018-06-22 PROCEDURE — 1090F PRES/ABSN URINE INCON ASSESS: CPT | Performed by: NURSE PRACTITIONER

## 2018-06-22 PROCEDURE — G8427 DOCREV CUR MEDS BY ELIG CLIN: HCPCS | Performed by: NURSE PRACTITIONER

## 2018-06-22 PROCEDURE — 73610 X-RAY EXAM OF ANKLE: CPT

## 2018-06-22 PROCEDURE — 85379 FIBRIN DEGRADATION QUANT: CPT

## 2018-06-22 PROCEDURE — 3017F COLORECTAL CA SCREEN DOC REV: CPT | Performed by: NURSE PRACTITIONER

## 2018-06-22 PROCEDURE — 36415 COLL VENOUS BLD VENIPUNCTURE: CPT

## 2018-06-22 PROCEDURE — 73562 X-RAY EXAM OF KNEE 3: CPT

## 2018-06-22 PROCEDURE — 1036F TOBACCO NON-USER: CPT | Performed by: NURSE PRACTITIONER

## 2018-06-22 PROCEDURE — 99213 OFFICE O/P EST LOW 20 MIN: CPT | Performed by: NURSE PRACTITIONER

## 2018-06-22 ASSESSMENT — ENCOUNTER SYMPTOMS
EYES NEGATIVE: 1
SORE THROAT: 0
WHEEZING: 0
CHEST TIGHTNESS: 0
ABDOMINAL DISTENTION: 0
DIARRHEA: 0
COUGH: 0

## 2018-06-22 ASSESSMENT — PATIENT HEALTH QUESTIONNAIRE - PHQ9
SUM OF ALL RESPONSES TO PHQ QUESTIONS 1-9: 0
2. FEELING DOWN, DEPRESSED OR HOPELESS: 0
SUM OF ALL RESPONSES TO PHQ9 QUESTIONS 1 & 2: 0
1. LITTLE INTEREST OR PLEASURE IN DOING THINGS: 0

## 2018-06-23 ENCOUNTER — HOSPITAL ENCOUNTER (OUTPATIENT)
Dept: VASCULAR LAB | Age: 68
Discharge: HOME | End: 2018-06-25
Payer: MEDICARE

## 2018-06-23 DIAGNOSIS — M79.89 LEFT LEG SWELLING: ICD-10-CM

## 2018-06-23 PROCEDURE — 93971 EXTREMITY STUDY: CPT

## 2018-06-24 ENCOUNTER — TELEPHONE (OUTPATIENT)
Dept: FAMILY MEDICINE CLINIC | Age: 68
End: 2018-06-24
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
## 2018-06-24 DIAGNOSIS — M25.562 ACUTE PAIN OF LEFT KNEE: ICD-10-CM

## 2018-06-24 DIAGNOSIS — M23.92 INTERNAL DERANGEMENT OF MULTIPLE SITES OF LEFT KNEE: ICD-10-CM

## 2018-06-26 ENCOUNTER — OFFICE VISIT (OUTPATIENT)
Dept: FAMILY MEDICINE CLINIC | Age: 68
End: 2018-06-26
Payer: MEDICARE

## 2018-06-26 VITALS
WEIGHT: 206 LBS | RESPIRATION RATE: 18 BRPM | BODY MASS INDEX: 35.17 KG/M2 | HEIGHT: 64 IN | SYSTOLIC BLOOD PRESSURE: 130 MMHG | DIASTOLIC BLOOD PRESSURE: 84 MMHG | HEART RATE: 75 BPM

## 2018-06-26 DIAGNOSIS — M79.605 ACUTE PAIN OF LEFT LOWER EXTREMITY: Primary | ICD-10-CM

## 2018-06-26 PROCEDURE — 1090F PRES/ABSN URINE INCON ASSESS: CPT | Performed by: INTERNAL MEDICINE

## 2018-06-26 PROCEDURE — G8427 DOCREV CUR MEDS BY ELIG CLIN: HCPCS | Performed by: INTERNAL MEDICINE

## 2018-06-26 PROCEDURE — 4040F PNEUMOC VAC/ADMIN/RCVD: CPT | Performed by: INTERNAL MEDICINE

## 2018-06-26 PROCEDURE — 1036F TOBACCO NON-USER: CPT | Performed by: INTERNAL MEDICINE

## 2018-06-26 PROCEDURE — 3017F COLORECTAL CA SCREEN DOC REV: CPT | Performed by: INTERNAL MEDICINE

## 2018-06-26 PROCEDURE — 99213 OFFICE O/P EST LOW 20 MIN: CPT | Performed by: INTERNAL MEDICINE

## 2018-06-26 PROCEDURE — 1123F ACP DISCUSS/DSCN MKR DOCD: CPT | Performed by: INTERNAL MEDICINE

## 2018-06-26 PROCEDURE — G8399 PT W/DXA RESULTS DOCUMENT: HCPCS | Performed by: INTERNAL MEDICINE

## 2018-06-26 PROCEDURE — G8417 CALC BMI ABV UP PARAM F/U: HCPCS | Performed by: INTERNAL MEDICINE

## 2018-06-26 ASSESSMENT — ENCOUNTER SYMPTOMS
NAUSEA: 0
BLURRED VISION: 0
ABDOMINAL PAIN: 0
SHORTNESS OF BREATH: 0
HEARTBURN: 0
SORE THROAT: 0
COUGH: 0

## 2018-08-20 DIAGNOSIS — E78.00 PURE HYPERCHOLESTEROLEMIA: ICD-10-CM

## 2018-08-20 DIAGNOSIS — F41.9 ANXIETY AND DEPRESSION: ICD-10-CM

## 2018-08-20 DIAGNOSIS — F32.A ANXIETY AND DEPRESSION: ICD-10-CM

## 2018-08-20 RX ORDER — ATORVASTATIN CALCIUM 20 MG/1
20 TABLET, FILM COATED ORAL DAILY
Qty: 90 TABLET | Refills: 1 | Status: SHIPPED | OUTPATIENT
Start: 2018-08-20 | End: 2019-02-04 | Stop reason: SDUPTHER

## 2018-08-20 RX ORDER — VENLAFAXINE HYDROCHLORIDE 75 MG/1
75 CAPSULE, EXTENDED RELEASE ORAL DAILY
Qty: 90 CAPSULE | Refills: 1 | Status: SHIPPED | OUTPATIENT
Start: 2018-08-20 | End: 2019-02-04 | Stop reason: SDUPTHER

## 2018-08-21 NOTE — TELEPHONE ENCOUNTER
From: Brenna Joyner  Sent: 8/19/2018 2:26 PM EDT  Subject: Medication Renewal Request    Brenna Joyner would like a refill of the following medications:     atorvastatin (LIPITOR) 20 MG tablet [Krupa Minor, APRN - CNP]     venlafaxine (EFFEXOR XR) 75 MG extended release capsule Marcos Estevez APRN - CNP]    Preferred pharmacy: [id number]lle Lizama  [id number] [phone] - F [phone]

## 2018-08-27 ENCOUNTER — OFFICE VISIT (OUTPATIENT)
Dept: FAMILY MEDICINE CLINIC | Age: 68
End: 2018-08-27
Payer: MEDICARE

## 2018-08-27 VITALS
HEIGHT: 63 IN | SYSTOLIC BLOOD PRESSURE: 150 MMHG | HEART RATE: 105 BPM | WEIGHT: 200 LBS | DIASTOLIC BLOOD PRESSURE: 80 MMHG | OXYGEN SATURATION: 98 % | BODY MASS INDEX: 35.44 KG/M2

## 2018-08-27 DIAGNOSIS — Z00.00 ROUTINE GENERAL MEDICAL EXAMINATION AT A HEALTH CARE FACILITY: Primary | ICD-10-CM

## 2018-08-27 DIAGNOSIS — Z12.39 BREAST CANCER SCREENING: ICD-10-CM

## 2018-08-27 PROCEDURE — G0438 PPPS, INITIAL VISIT: HCPCS | Performed by: NURSE PRACTITIONER

## 2018-08-27 PROCEDURE — 4040F PNEUMOC VAC/ADMIN/RCVD: CPT | Performed by: NURSE PRACTITIONER

## 2018-08-27 ASSESSMENT — PATIENT HEALTH QUESTIONNAIRE - PHQ9
SUM OF ALL RESPONSES TO PHQ QUESTIONS 1-9: 1
SUM OF ALL RESPONSES TO PHQ QUESTIONS 1-9: 1

## 2018-08-27 ASSESSMENT — LIFESTYLE VARIABLES
HOW OFTEN DURING THE LAST YEAR HAVE YOU FAILED TO DO WHAT WAS NORMALLY EXPECTED FROM YOU BECAUSE OF DRINKING: 0
HOW OFTEN DO YOU HAVE SIX OR MORE DRINKS ON ONE OCCASION: 0
HOW OFTEN DURING THE LAST YEAR HAVE YOU FOUND THAT YOU WERE NOT ABLE TO STOP DRINKING ONCE YOU HAD STARTED: 0
HAVE YOU OR SOMEONE ELSE BEEN INJURED AS A RESULT OF YOUR DRINKING: 0
HOW OFTEN DURING THE LAST YEAR HAVE YOU HAD A FEELING OF GUILT OR REMORSE AFTER DRINKING: 0
AUDIT-C TOTAL SCORE: 1
HOW OFTEN DURING THE LAST YEAR HAVE YOU BEEN UNABLE TO REMEMBER WHAT HAPPENED THE NIGHT BEFORE BECAUSE YOU HAD BEEN DRINKING: 0
HOW OFTEN DO YOU HAVE A DRINK CONTAINING ALCOHOL: 1
HOW OFTEN DURING THE LAST YEAR HAVE YOU NEEDED AN ALCOHOLIC DRINK FIRST THING IN THE MORNING TO GET YOURSELF GOING AFTER A NIGHT OF HEAVY DRINKING: 0
AUDIT TOTAL SCORE: 1
HOW MANY STANDARD DRINKS CONTAINING ALCOHOL DO YOU HAVE ON A TYPICAL DAY: 0
HAS A RELATIVE, FRIEND, DOCTOR, OR ANOTHER HEALTH PROFESSIONAL EXPRESSED CONCERN ABOUT YOUR DRINKING OR SUGGESTED YOU CUT DOWN: 0

## 2018-08-27 ASSESSMENT — ANXIETY QUESTIONNAIRES: GAD7 TOTAL SCORE: 1

## 2018-08-27 NOTE — PROGRESS NOTES
Subjective:      Patient ID: Beny Richey is a 80 y.o. female. Social History     Social History    Marital status:      Spouse name: N/A    Number of children: 2    Years of education: 15     Occupational History          cosmotologist , school lunch helper     Social History Main Topics    Smoking status: Never Smoker    Smokeless tobacco: Never Used      Comment: hx smoking-6 years 1/2 ppd or less    Alcohol use No    Drug use: No    Sexual activity: Not on file     Other Topics Concern    Not on file     Social History Narrative    No narrative on file     Family History   Problem Relation Age of Onset    Hypertension Mother     Dementia Mother     Hypertension Father     Heart Disease Father         stroke during heart surgery     High Cholesterol Father     Heart Disease Brother         cath    Heart Attack Maternal Grandmother     Other Paternal Grandmother         cerbral aneurysm    Alcohol Abuse Brother     Cancer Brother         kidney     Past Medical History:   Diagnosis Date    Anxiety     Hyperlipidemia     Hypertension     onset early 70'P    Lichen simplex 4403    shoulders and right leg    Osteopenia 2011    femoral necks jean -1.9, -1.8       HPI    Osteopenia with T scoe -1.9  Vitamin D 3 and calcium    Left knee osteoarthritis with no longer need brace . Walking well  No assistive devices to ambulate. Seen Dr. Mimi Carrillo. Rheumatic fever as child denies it affected her heart valves. Review of Systems   Constitutional: Negative for activity change, chills, fatigue and fever. HENT: Negative for congestion, postnasal drip and sore throat. Eyes: Negative. Respiratory: Negative for chest tightness. Cardiovascular: Negative for chest pain. Gastrointestinal: Negative for abdominal distention. Endocrine: Negative. Genitourinary: Negative for difficulty urinating. Musculoskeletal: Negative for arthralgias.

## 2018-08-27 NOTE — PATIENT INSTRUCTIONS
Personalized Preventive Plan for Connor Chandler - 8/27/2018  Medicare offers a range of preventive health benefits. Some of the tests and screenings are paid in full while other may be subject to a deductible, co-insurance, and/or copay. Some of these benefits include a comprehensive review of your medical history including lifestyle, illnesses that may run in your family, and various assessments and screenings as appropriate. After reviewing your medical record and screening and assessments performed today your provider may have ordered immunizations, labs, imaging, and/or referrals for you. A list of these orders (if applicable) as well as your Preventive Care list are included within your After Visit Summary for your review. Other Preventive Recommendations:    · A preventive eye exam performed by an eye specialist is recommended every 1-2 years to screen for glaucoma; cataracts, macular degeneration, and other eye disorders. · A preventive dental visit is recommended every 6 months. · Try to get at least 150 minutes of exercise per week or 10,000 steps per day on a pedometer . · Order or download the FREE \"Exercise & Physical Activity: Your Everyday Guide\" from [employer]. Call 8-414.352.7622 or search [employer] online. · You need 2310-6705 mg of calcium and 4423-7189 IU of vitamin D per day. It is possible to meet your calcium requirement with diet alone, but a vitamin D supplement is usually necessary to meet this goal.  · When exposed to the sun, use a sunscreen that protects against both UVA and UVB radiation with an SPF of 30 or greater. Reapply every 2 to 3 hours or after sweating, drying off with a towel, or swimming. · Always wear a seat belt when traveling in a car. Always wear a helmet when riding a bicycle or motorcycle.

## 2018-08-28 ASSESSMENT — ENCOUNTER SYMPTOMS
SORE THROAT: 0
CHEST TIGHTNESS: 0
EYES NEGATIVE: 1
ABDOMINAL DISTENTION: 0

## 2018-09-19 ENCOUNTER — HOSPITAL ENCOUNTER (OUTPATIENT)
Dept: MAMMOGRAPHY | Age: 68
Discharge: HOME | End: 2018-09-21
Payer: MEDICARE

## 2018-09-19 DIAGNOSIS — Z12.39 BREAST CANCER SCREENING: ICD-10-CM

## 2018-09-19 PROCEDURE — 77067 SCR MAMMO BI INCL CAD: CPT

## 2018-12-13 ENCOUNTER — TELEPHONE (OUTPATIENT)
Dept: FAMILY MEDICINE CLINIC | Age: 68
End: 2018-12-13

## 2018-12-13 RX ORDER — LOSARTAN POTASSIUM 100 MG/1
100 TABLET ORAL DAILY
Qty: 90 TABLET | Refills: 1 | Status: SHIPPED | OUTPATIENT
Start: 2018-12-13 | End: 2019-06-11 | Stop reason: SDUPTHER

## 2018-12-13 RX ORDER — AMLODIPINE BESYLATE 5 MG/1
5 TABLET ORAL DAILY
Qty: 90 TABLET | Refills: 1 | Status: SHIPPED | OUTPATIENT
Start: 2018-12-13 | End: 2019-06-11 | Stop reason: SDUPTHER

## 2018-12-13 NOTE — TELEPHONE ENCOUNTER
Patient asking for a new script for Losartan - patient uses Drug [facility] Maintenance   Topic Date Due    DTaP/Tdap/Td vaccine (1 - Tdap) 06/19/1970    Shingles Vaccine (1 of 2 - 2 Dose Series) 05/31/2014    Diabetic foot exam  07/31/2018    A1C test (Diabetic or Prediabetic)  01/25/2019    Lipid screen  01/25/2019    Diabetic retinal exam  01/30/2019    Pneumococcal low/med risk (2 of 2 - PPSV23) 03/31/2019    Potassium monitoring  04/26/2019    Creatinine monitoring  04/26/2019    Diabetic microalbuminuria test  04/30/2019    Breast cancer screen  09/19/2020    Colon cancer screen colonoscopy  09/12/2026    DEXA (modify frequency per FRAX score)  Completed    Flu vaccine  Completed    Hepatitis C screen  Completed             (applicable per patient's age: Cancer Screenings, Depression Screening, Fall Risk Screening, Immunizations)    Hemoglobin A1C (%)   Date Value   01/25/2018 6.4 (H)   10/31/2017 6.2   07/26/2017 6.7 (H)     Microalb/Crt.  Ratio (mcg/mg creat)   Date Value   04/30/2018 7     LDL Cholesterol (mg/dL)   Date Value   01/25/2018 90     AST (U/L)   Date Value   07/26/2017 13     ALT (U/L)   Date Value   07/26/2017 15     BUN (mg/dL)   Date Value   04/26/2018 14      (goal A1C is < 7)   (goal LDL is <100) need 30-50% reduction from baseline     BP Readings from Last 3 Encounters:   08/27/18 (!) 150/80   06/26/18 130/84   06/22/18 120/74    (goal /80)      All Future Testing planned in [facility]:  Lab Frequency Next Occurrence   US DOPPLER VENOUS LEG LEFT Once 06/23/2018       Next Visit Date:  Future Appointments  Date Time Provider Soha Rodriguez   2/27/2019 1:00 PM Sukhdev Pabon, DANA - [facility]            Patient Active Problem List:     Type 2 diabetes, HbA1c goal < 7% ([facility] [address]     Pure hypercholesterolemia     Essential hypertension, benign     Senile osteoporosis     Anxiety state     Dermatophytosis of foot

## 2019-02-04 DIAGNOSIS — F32.A ANXIETY AND DEPRESSION: ICD-10-CM

## 2019-02-04 DIAGNOSIS — E78.00 PURE HYPERCHOLESTEROLEMIA: ICD-10-CM

## 2019-02-04 DIAGNOSIS — F41.9 ANXIETY AND DEPRESSION: ICD-10-CM

## 2019-02-04 RX ORDER — ATORVASTATIN CALCIUM 20 MG/1
20 TABLET, FILM COATED ORAL DAILY
Qty: 90 TABLET | Refills: 1 | Status: SHIPPED | OUTPATIENT
Start: 2019-02-04 | End: 2019-08-10 | Stop reason: SDUPTHER

## 2019-02-04 RX ORDER — VENLAFAXINE HYDROCHLORIDE 75 MG/1
75 CAPSULE, EXTENDED RELEASE ORAL DAILY
Qty: 90 CAPSULE | Refills: 1 | Status: SHIPPED | OUTPATIENT
Start: 2019-02-04 | End: 2019-08-10 | Stop reason: SDUPTHER

## 2019-02-25 ENCOUNTER — HOSPITAL ENCOUNTER (OUTPATIENT)
Age: 69
Discharge: HOME | End: 2019-02-25
Payer: MEDICARE

## 2019-02-25 DIAGNOSIS — E55.9 VITAMIN D DEFICIENCY: ICD-10-CM

## 2019-02-25 DIAGNOSIS — E11.9 TYPE 2 DIABETES, HBA1C GOAL < 7% (HCC): ICD-10-CM

## 2019-02-25 DIAGNOSIS — I10 ESSENTIAL HYPERTENSION, BENIGN: ICD-10-CM

## 2019-02-25 DIAGNOSIS — Z13.29 SCREENING FOR THYROID DISORDER: ICD-10-CM

## 2019-02-25 DIAGNOSIS — K14.4 SMOOTH TONGUE: ICD-10-CM

## 2019-02-25 DIAGNOSIS — E08.8 DIABETES MELLITUS DUE TO UNDERLYING CONDITION WITH COMPLICATION, WITHOUT LONG-TERM CURRENT USE OF INSULIN (HCC): ICD-10-CM

## 2019-02-25 DIAGNOSIS — E78.00 PURE HYPERCHOLESTEROLEMIA: ICD-10-CM

## 2019-02-25 DIAGNOSIS — E66.09 CLASS 2 OBESITY DUE TO EXCESS CALORIES WITHOUT SERIOUS COMORBIDITY WITH BODY MASS INDEX (BMI) OF 35.0 TO 35.9 IN ADULT: ICD-10-CM

## 2019-02-25 LAB
ALBUMIN SERPL-MCNC: 4.1 G/DL (ref 3.5–5.2)
ALBUMIN/GLOBULIN RATIO: ABNORMAL (ref 1–2.5)
ALP BLD-CCNC: 115 U/L (ref 35–104)
ALT SERPL-CCNC: 14 U/L (ref 5–33)
ANION GAP SERPL CALCULATED.3IONS-SCNC: 13 MMOL/L (ref 9–17)
AST SERPL-CCNC: 10 U/L
BILIRUB SERPL-MCNC: 0.27 MG/DL (ref 0.3–1.2)
BILIRUBIN DIRECT: <0.08 MG/DL
BILIRUBIN, INDIRECT: ABNORMAL MG/DL (ref 0–1)
BUN BLDV-MCNC: 15 MG/DL (ref 8–23)
BUN/CREAT BLD: 23 (ref 9–20)
CALCIUM SERPL-MCNC: 9.2 MG/DL (ref 8.6–10.4)
CHLORIDE BLD-SCNC: 109 MMOL/L (ref 98–107)
CHOLESTEROL/HDL RATIO: 3.7
CHOLESTEROL: 147 MG/DL
CO2: 21 MMOL/L (ref 20–31)
CREAT SERPL-MCNC: 0.66 MG/DL (ref 0.5–0.9)
ESTIMATED AVERAGE GLUCOSE: 117 MG/DL
FOLATE: >20 NG/ML
GFR AFRICAN AMERICAN: >60 ML/MIN
GFR NON-AFRICAN AMERICAN: >60 ML/MIN
GFR SERPL CREATININE-BSD FRML MDRD: ABNORMAL ML/MIN/{1.73_M2}
GFR SERPL CREATININE-BSD FRML MDRD: ABNORMAL ML/MIN/{1.73_M2}
GLOBULIN: ABNORMAL G/DL (ref 1.5–3.8)
GLUCOSE BLD-MCNC: 141 MG/DL (ref 70–99)
HBA1C MFR BLD: 5.7 % (ref 4.8–5.9)
HDLC SERPL-MCNC: 40 MG/DL
LDL CHOLESTEROL: 83 MG/DL (ref 0–130)
PATIENT FASTING?: YES
POTASSIUM SERPL-SCNC: 4.4 MMOL/L (ref 3.7–5.3)
SODIUM BLD-SCNC: 143 MMOL/L (ref 135–144)
TOTAL PROTEIN: 7.4 G/DL (ref 6.4–8.3)
TRIGL SERPL-MCNC: 118 MG/DL
TSH SERPL DL<=0.05 MIU/L-ACNC: 4.38 MIU/L (ref 0.3–5)
VITAMIN B-12: 400 PG/ML (ref 232–1245)
VITAMIN D 25-HYDROXY: 19.4 NG/ML (ref 30–100)
VLDLC SERPL CALC-MCNC: ABNORMAL MG/DL (ref 1–30)

## 2019-02-25 PROCEDURE — 82043 UR ALBUMIN QUANTITATIVE: CPT

## 2019-02-25 PROCEDURE — 80076 HEPATIC FUNCTION PANEL: CPT

## 2019-02-25 PROCEDURE — 82306 VITAMIN D 25 HYDROXY: CPT

## 2019-02-25 PROCEDURE — 82746 ASSAY OF FOLIC ACID SERUM: CPT

## 2019-02-25 PROCEDURE — 82607 VITAMIN B-12: CPT

## 2019-02-25 PROCEDURE — 82570 ASSAY OF URINE CREATININE: CPT

## 2019-02-25 PROCEDURE — 80048 BASIC METABOLIC PNL TOTAL CA: CPT

## 2019-02-25 PROCEDURE — 36415 COLL VENOUS BLD VENIPUNCTURE: CPT

## 2019-02-25 PROCEDURE — 80061 LIPID PANEL: CPT

## 2019-02-25 PROCEDURE — 84443 ASSAY THYROID STIM HORMONE: CPT

## 2019-02-25 PROCEDURE — 83036 HEMOGLOBIN GLYCOSYLATED A1C: CPT

## 2019-02-26 LAB
CREATININE URINE: 186 MG/DL (ref 28–217)
MICROALBUMIN/CREAT 24H UR: <12 MG/L
MICROALBUMIN/CREAT UR-RTO: NORMAL MCG/MG CREAT

## 2019-02-27 ENCOUNTER — OFFICE VISIT (OUTPATIENT)
Dept: FAMILY MEDICINE CLINIC | Age: 69
End: 2019-02-27
Payer: MEDICARE

## 2019-02-27 VITALS
TEMPERATURE: 98.6 F | WEIGHT: 192.8 LBS | OXYGEN SATURATION: 98 % | DIASTOLIC BLOOD PRESSURE: 88 MMHG | BODY MASS INDEX: 34.15 KG/M2 | SYSTOLIC BLOOD PRESSURE: 146 MMHG | HEART RATE: 95 BPM

## 2019-02-27 DIAGNOSIS — E78.00 PURE HYPERCHOLESTEROLEMIA: ICD-10-CM

## 2019-02-27 DIAGNOSIS — I10 ESSENTIAL HYPERTENSION, BENIGN: ICD-10-CM

## 2019-02-27 DIAGNOSIS — F41.1 ANXIETY STATE: ICD-10-CM

## 2019-02-27 DIAGNOSIS — E11.9 TYPE 2 DIABETES, HBA1C GOAL < 7% (HCC): Primary | ICD-10-CM

## 2019-02-27 PROCEDURE — 1101F PT FALLS ASSESS-DOCD LE1/YR: CPT | Performed by: NURSE PRACTITIONER

## 2019-02-27 PROCEDURE — 4040F PNEUMOC VAC/ADMIN/RCVD: CPT | Performed by: NURSE PRACTITIONER

## 2019-02-27 PROCEDURE — G8482 FLU IMMUNIZE ORDER/ADMIN: HCPCS | Performed by: NURSE PRACTITIONER

## 2019-02-27 PROCEDURE — 2022F DILAT RTA XM EVC RTNOPTHY: CPT | Performed by: NURSE PRACTITIONER

## 2019-02-27 PROCEDURE — G8427 DOCREV CUR MEDS BY ELIG CLIN: HCPCS | Performed by: NURSE PRACTITIONER

## 2019-02-27 PROCEDURE — 3017F COLORECTAL CA SCREEN DOC REV: CPT | Performed by: NURSE PRACTITIONER

## 2019-02-27 PROCEDURE — G8417 CALC BMI ABV UP PARAM F/U: HCPCS | Performed by: NURSE PRACTITIONER

## 2019-02-27 PROCEDURE — G8399 PT W/DXA RESULTS DOCUMENT: HCPCS | Performed by: NURSE PRACTITIONER

## 2019-02-27 PROCEDURE — 1090F PRES/ABSN URINE INCON ASSESS: CPT | Performed by: NURSE PRACTITIONER

## 2019-02-27 PROCEDURE — 1123F ACP DISCUSS/DSCN MKR DOCD: CPT | Performed by: NURSE PRACTITIONER

## 2019-02-27 PROCEDURE — 1036F TOBACCO NON-USER: CPT | Performed by: NURSE PRACTITIONER

## 2019-02-27 PROCEDURE — 3044F HG A1C LEVEL LT 7.0%: CPT | Performed by: NURSE PRACTITIONER

## 2019-02-27 PROCEDURE — 99214 OFFICE O/P EST MOD 30 MIN: CPT | Performed by: NURSE PRACTITIONER

## 2019-02-27 RX ORDER — CELECOXIB 100 MG/1
100 CAPSULE ORAL 2 TIMES DAILY
Qty: 60 CAPSULE | Refills: 0 | Status: SHIPPED | OUTPATIENT
Start: 2019-02-27 | End: 2020-12-07 | Stop reason: ALTCHOICE

## 2019-02-27 ASSESSMENT — ENCOUNTER SYMPTOMS
VOMITING: 0
BACK PAIN: 0
SINUS PAIN: 1
DIARRHEA: 0
NAUSEA: 0
VISUAL CHANGE: 1
ABDOMINAL PAIN: 1
CONSTIPATION: 0
BLURRED VISION: 0
SINUS PRESSURE: 1
COUGH: 0
SHORTNESS OF BREATH: 0

## 2019-02-27 ASSESSMENT — PATIENT HEALTH QUESTIONNAIRE - PHQ9
SUM OF ALL RESPONSES TO PHQ QUESTIONS 1-9: 0
SUM OF ALL RESPONSES TO PHQ QUESTIONS 1-9: 0
SUM OF ALL RESPONSES TO PHQ9 QUESTIONS 1 & 2: 0
2. FEELING DOWN, DEPRESSED OR HOPELESS: 0
1. LITTLE INTEREST OR PLEASURE IN DOING THINGS: 0

## 2019-03-26 ENCOUNTER — TELEPHONE (OUTPATIENT)
Dept: FAMILY MEDICINE CLINIC | Age: 69
End: 2019-03-26

## 2019-06-11 RX ORDER — LOSARTAN POTASSIUM 100 MG/1
100 TABLET ORAL DAILY
Qty: 90 TABLET | Refills: 1 | Status: SHIPPED | OUTPATIENT
Start: 2019-06-11 | End: 2019-12-11 | Stop reason: SDUPTHER

## 2019-06-11 RX ORDER — AMLODIPINE BESYLATE 5 MG/1
5 TABLET ORAL DAILY
Qty: 90 TABLET | Refills: 1 | Status: SHIPPED | OUTPATIENT
Start: 2019-06-11 | End: 2020-01-02 | Stop reason: SDUPTHER

## 2019-06-11 NOTE — TELEPHONE ENCOUNTER
Losartan 100 mg    DM-berenice    Health Maintenance   Topic Date Due    DTaP/Tdap/Td vaccine (1 - Tdap) 06/19/1970    Shingles Vaccine (2 of 3) 05/26/2014    Diabetic foot exam  07/31/2018    Diabetic retinal exam  01/30/2019    Pneumococcal 65+ years Vaccine (2 of 2 - PPSV23) 03/31/2019    A1C test (Diabetic or Prediabetic)  02/25/2020    Diabetic microalbuminuria test  02/25/2020    Lipid screen  02/25/2020    Potassium monitoring  02/25/2020    Creatinine monitoring  02/25/2020    Breast cancer screen  09/19/2020    Colon cancer screen colonoscopy  09/12/2026    DEXA (modify frequency per FRAX score)  Completed    Flu vaccine  Completed    Hepatitis C screen  Completed             (applicable per patient's age: Cancer Screenings, Depression Screening, Fall Risk Screening, Immunizations)    Hemoglobin A1C (%)   Date Value   02/25/2019 5.7   01/25/2018 6.4 (H)   10/31/2017 6.2     Microalb/Crt.  Ratio (mcg/mg creat)   Date Value   02/25/2019 CANNOT BE CALCULATED     LDL Cholesterol (mg/dL)   Date Value   02/25/2019 83     AST (U/L)   Date Value   02/25/2019 10     ALT (U/L)   Date Value   02/25/2019 14     BUN (mg/dL)   Date Value   02/25/2019 15      (goal A1C is < 7)   (goal LDL is <100) need 30-50% reduction from baseline     BP Readings from Last 3 Encounters:   02/27/19 (!) 146/88   08/27/18 (!) 150/80   06/26/18 130/84    (goal /80)      All Future Testing planned in [facility]:  Lab Frequency Next Occurrence       Next Visit Date:  Future Appointments   Date Time Provider Soha Rodriguez   9/9/2019  1:30 PM Marshall Meade, APRN - CNP [facility]            Patient Active Problem List:     Type 2 diabetes, HbA1c goal < 7% ([address]     Pure hypercholesterolemia     Essential hypertension, benign     Senile osteoporosis     Anxiety state     Dermatophytosis of foot

## 2019-08-09 ENCOUNTER — TELEPHONE (OUTPATIENT)
Dept: FAMILY MEDICINE CLINIC | Age: 69
End: 2019-08-09

## 2019-08-09 DIAGNOSIS — E78.00 PURE HYPERCHOLESTEROLEMIA: ICD-10-CM

## 2019-08-09 DIAGNOSIS — F41.9 ANXIETY AND DEPRESSION: ICD-10-CM

## 2019-08-09 DIAGNOSIS — F32.A ANXIETY AND DEPRESSION: ICD-10-CM

## 2019-08-10 RX ORDER — ATORVASTATIN CALCIUM 20 MG/1
20 TABLET, FILM COATED ORAL DAILY
Qty: 90 TABLET | Refills: 1 | Status: SHIPPED | OUTPATIENT
Start: 2019-08-10 | End: 2020-02-13 | Stop reason: SDUPTHER

## 2019-08-10 RX ORDER — VENLAFAXINE HYDROCHLORIDE 75 MG/1
75 CAPSULE, EXTENDED RELEASE ORAL DAILY
Qty: 90 CAPSULE | Refills: 1 | Status: SHIPPED | OUTPATIENT
Start: 2019-08-10 | End: 2020-02-13 | Stop reason: SDUPTHER

## 2019-11-11 ENCOUNTER — OFFICE VISIT (OUTPATIENT)
Dept: FAMILY MEDICINE CLINIC | Age: 69
End: 2019-11-11
Payer: MEDICARE

## 2019-11-11 VITALS
SYSTOLIC BLOOD PRESSURE: 139 MMHG | OXYGEN SATURATION: 98 % | DIASTOLIC BLOOD PRESSURE: 84 MMHG | BODY MASS INDEX: 37.73 KG/M2 | HEART RATE: 128 BPM | WEIGHT: 213 LBS

## 2019-11-11 DIAGNOSIS — E08.8 DIABETES MELLITUS DUE TO UNDERLYING CONDITION WITH COMPLICATION, WITHOUT LONG-TERM CURRENT USE OF INSULIN (HCC): ICD-10-CM

## 2019-11-11 DIAGNOSIS — Z00.00 ROUTINE GENERAL MEDICAL EXAMINATION AT A HEALTH CARE FACILITY: Primary | ICD-10-CM

## 2019-11-11 DIAGNOSIS — H61.21 HEARING LOSS OF RIGHT EAR DUE TO CERUMEN IMPACTION: ICD-10-CM

## 2019-11-11 DIAGNOSIS — F41.1 ANXIETY STATE: ICD-10-CM

## 2019-11-11 DIAGNOSIS — I10 ESSENTIAL HYPERTENSION, BENIGN: ICD-10-CM

## 2019-11-11 DIAGNOSIS — Z12.31 ENCOUNTER FOR SCREENING MAMMOGRAM FOR MALIGNANT NEOPLASM OF BREAST: ICD-10-CM

## 2019-11-11 DIAGNOSIS — Z13.0 SCREENING, ANEMIA, DEFICIENCY, IRON: ICD-10-CM

## 2019-11-11 DIAGNOSIS — E55.9 VITAMIN D DEFICIENCY: ICD-10-CM

## 2019-11-11 DIAGNOSIS — Z12.39 BREAST CANCER SCREENING: ICD-10-CM

## 2019-11-11 DIAGNOSIS — E78.00 PURE HYPERCHOLESTEROLEMIA: ICD-10-CM

## 2019-11-11 DIAGNOSIS — Z23 NEED FOR PROPHYLACTIC VACCINATION AGAINST STREPTOCOCCUS PNEUMONIAE (PNEUMOCOCCUS): ICD-10-CM

## 2019-11-11 DIAGNOSIS — Z13.29 SCREENING FOR THYROID DISORDER: ICD-10-CM

## 2019-11-11 DIAGNOSIS — E66.01 CLASS 2 SEVERE OBESITY DUE TO EXCESS CALORIES WITH SERIOUS COMORBIDITY AND BODY MASS INDEX (BMI) OF 37.0 TO 37.9 IN ADULT (HCC): ICD-10-CM

## 2019-11-11 DIAGNOSIS — M81.0 SENILE OSTEOPOROSIS: ICD-10-CM

## 2019-11-11 DIAGNOSIS — Z70.9: ICD-10-CM

## 2019-11-11 PROCEDURE — 1090F PRES/ABSN URINE INCON ASSESS: CPT | Performed by: NURSE PRACTITIONER

## 2019-11-11 PROCEDURE — G8399 PT W/DXA RESULTS DOCUMENT: HCPCS | Performed by: NURSE PRACTITIONER

## 2019-11-11 PROCEDURE — G0009 ADMIN PNEUMOCOCCAL VACCINE: HCPCS | Performed by: NURSE PRACTITIONER

## 2019-11-11 PROCEDURE — 90732 PPSV23 VACC 2 YRS+ SUBQ/IM: CPT | Performed by: NURSE PRACTITIONER

## 2019-11-11 PROCEDURE — G8417 CALC BMI ABV UP PARAM F/U: HCPCS | Performed by: NURSE PRACTITIONER

## 2019-11-11 PROCEDURE — 1036F TOBACCO NON-USER: CPT | Performed by: NURSE PRACTITIONER

## 2019-11-11 PROCEDURE — 3017F COLORECTAL CA SCREEN DOC REV: CPT | Performed by: NURSE PRACTITIONER

## 2019-11-11 PROCEDURE — 99212 OFFICE O/P EST SF 10 MIN: CPT | Performed by: NURSE PRACTITIONER

## 2019-11-11 PROCEDURE — G8482 FLU IMMUNIZE ORDER/ADMIN: HCPCS | Performed by: NURSE PRACTITIONER

## 2019-11-11 PROCEDURE — G8427 DOCREV CUR MEDS BY ELIG CLIN: HCPCS | Performed by: NURSE PRACTITIONER

## 2019-11-11 PROCEDURE — G0439 PPPS, SUBSEQ VISIT: HCPCS | Performed by: NURSE PRACTITIONER

## 2019-11-11 PROCEDURE — 1123F ACP DISCUSS/DSCN MKR DOCD: CPT | Performed by: NURSE PRACTITIONER

## 2019-11-11 PROCEDURE — 4040F PNEUMOC VAC/ADMIN/RCVD: CPT | Performed by: NURSE PRACTITIONER

## 2019-11-11 ASSESSMENT — ENCOUNTER SYMPTOMS
ABDOMINAL DISTENTION: 0
EYES NEGATIVE: 1
COUGH: 0

## 2019-11-11 ASSESSMENT — LIFESTYLE VARIABLES
HOW OFTEN DURING THE LAST YEAR HAVE YOU NEEDED AN ALCOHOLIC DRINK FIRST THING IN THE MORNING TO GET YOURSELF GOING AFTER A NIGHT OF HEAVY DRINKING: 0
AUDIT-C TOTAL SCORE: 1
HOW OFTEN DURING THE LAST YEAR HAVE YOU FAILED TO DO WHAT WAS NORMALLY EXPECTED FROM YOU BECAUSE OF DRINKING: 0
HAVE YOU OR SOMEONE ELSE BEEN INJURED AS A RESULT OF YOUR DRINKING: 0
HOW OFTEN DURING THE LAST YEAR HAVE YOU BEEN UNABLE TO REMEMBER WHAT HAPPENED THE NIGHT BEFORE BECAUSE YOU HAD BEEN DRINKING: 0
HOW OFTEN DURING THE LAST YEAR HAVE YOU FOUND THAT YOU WERE NOT ABLE TO STOP DRINKING ONCE YOU HAD STARTED: 0
HAS A RELATIVE, FRIEND, DOCTOR, OR ANOTHER HEALTH PROFESSIONAL EXPRESSED CONCERN ABOUT YOUR DRINKING OR SUGGESTED YOU CUT DOWN: 0
HOW OFTEN DO YOU HAVE SIX OR MORE DRINKS ON ONE OCCASION: 0
HOW OFTEN DURING THE LAST YEAR HAVE YOU HAD A FEELING OF GUILT OR REMORSE AFTER DRINKING: 0
AUDIT TOTAL SCORE: 1
HOW OFTEN DO YOU HAVE A DRINK CONTAINING ALCOHOL: 1
HOW MANY STANDARD DRINKS CONTAINING ALCOHOL DO YOU HAVE ON A TYPICAL DAY: 0

## 2019-11-11 ASSESSMENT — PATIENT HEALTH QUESTIONNAIRE - PHQ9
SUM OF ALL RESPONSES TO PHQ QUESTIONS 1-9: 2
SUM OF ALL RESPONSES TO PHQ QUESTIONS 1-9: 2

## 2019-12-11 ENCOUNTER — TELEPHONE (OUTPATIENT)
Dept: PRIMARY CARE CLINIC | Age: 69
End: 2019-12-11

## 2019-12-11 DIAGNOSIS — I10 ESSENTIAL HYPERTENSION, BENIGN: Primary | ICD-10-CM

## 2019-12-11 RX ORDER — LOSARTAN POTASSIUM 100 MG/1
100 TABLET ORAL DAILY
Qty: 90 TABLET | Refills: 1 | Status: SHIPPED | OUTPATIENT
Start: 2019-12-11 | End: 2020-06-12

## 2020-01-02 ENCOUNTER — TELEPHONE (OUTPATIENT)
Dept: FAMILY MEDICINE CLINIC | Age: 70
End: 2020-01-02

## 2020-01-02 RX ORDER — AMLODIPINE BESYLATE 5 MG/1
5 TABLET ORAL DAILY
Qty: 90 TABLET | Refills: 1 | Status: SHIPPED | OUTPATIENT
Start: 2020-01-02 | End: 2020-06-10 | Stop reason: ALTCHOICE

## 2020-01-02 RX ORDER — AMLODIPINE BESYLATE 5 MG/1
TABLET ORAL
Qty: 90 TABLET | Refills: 1 | Status: SHIPPED | OUTPATIENT
Start: 2020-01-02 | End: 2020-06-10 | Stop reason: SDUPTHER

## 2020-01-02 NOTE — TELEPHONE ENCOUNTER
Patient needs new scripts for Amlodipine - patient uses [facility] Maintenance   Topic Date Due    DTaP/Tdap/Td vaccine (1 - Tdap) 06/19/1962    Shingles Vaccine (2 of 3) 05/26/2014    Diabetic foot exam  07/31/2018    Diabetic retinal exam  01/30/2019    A1C test (Diabetic or Prediabetic)  02/25/2020    Diabetic microalbuminuria test  02/25/2020    Lipid screen  02/25/2020    Potassium monitoring  02/25/2020    Creatinine monitoring  02/25/2020    Breast cancer screen  09/19/2020    Annual Wellness Visit (AWV)  11/10/2020    Colon cancer screen colonoscopy  09/12/2026    DEXA (modify frequency per FRAX score)  Completed    Flu vaccine  Completed    Pneumococcal 65+ years Vaccine  Completed    Hepatitis C screen  Completed             (applicable per patient's age: Cancer Screenings, Depression Screening, Fall Risk Screening, Immunizations)    Hemoglobin A1C (%)   Date Value   02/25/2019 5.7   01/25/2018 6.4 (H)   10/31/2017 6.2     Microalb/Crt.  Ratio (mcg/mg creat)   Date Value   02/25/2019 CANNOT BE CALCULATED     LDL Cholesterol (mg/dL)   Date Value   02/25/2019 83     AST (U/L)   Date Value   02/25/2019 10     ALT (U/L)   Date Value   02/25/2019 14     BUN (mg/dL)   Date Value   02/25/2019 15      (goal A1C is < 7)   (goal LDL is <100) need 30-50% reduction from baseline     BP Readings from Last 3 Encounters:   11/11/19 139/84   02/27/19 (!) 146/88   08/27/18 (!) 150/80    (goal /80)      All Future Testing planned in [facility]:  Lab Frequency Next Occurrence   CBC Auto Differential Once 11/10/2020   Comprehensive Metabolic Panel Once 38/79/1594   Hemoglobin A1C Once 11/09/2020   Lipid Panel Once 11/10/2020   Vitamin D 25 Hydroxy Once 11/09/2020   ABELARDO SCREENING W CAD BILATERAL 2 VW Once 03/20/2020   TSH with Reflex Once 11/09/2020       Next Visit Date:  Future Appointments   Date Time Provider Soha Rodriguez   5/11/2020  1:30 PM DANA Estes - [facility] Patient Active Problem List:     Type 2 diabetes, HbA1c goal < 7% ([facility])     Pure hypercholesterolemia     Essential hypertension, benign     Senile osteoporosis     Anxiety state     Dermatophytosis of foot

## 2020-01-02 NOTE — TELEPHONE ENCOUNTER
Last OV: 11/11/2019  Last RX:    Next scheduled apt: 5/11/2020 metformin and Norvasc pending Krupa's signature.

## 2020-02-13 ENCOUNTER — TELEPHONE (OUTPATIENT)
Dept: FAMILY MEDICINE CLINIC | Age: 70
End: 2020-02-13

## 2020-02-13 RX ORDER — VENLAFAXINE HYDROCHLORIDE 75 MG/1
75 CAPSULE, EXTENDED RELEASE ORAL DAILY
Qty: 90 CAPSULE | Refills: 1 | Status: SHIPPED | OUTPATIENT
Start: 2020-02-13 | End: 2020-08-05

## 2020-02-13 RX ORDER — ATORVASTATIN CALCIUM 20 MG/1
20 TABLET, FILM COATED ORAL DAILY
Qty: 90 TABLET | Refills: 1 | Status: SHIPPED | OUTPATIENT
Start: 2020-02-13 | End: 2020-08-05

## 2020-02-13 NOTE — TELEPHONE ENCOUNTER
BILATERAL 2 VW Once 03/20/2020   TSH with Reflex Once 11/09/2020       Next Visit Date:  Future Appointments   Date Time Provider Soha Rodriguez   5/11/2020  1:30 PM DANA Tapia - [facility]            Patient Active Problem List:     Type 2 diabetes, HbA1c goal < 7% ([facility] [address]     Pure hypercholesterolemia     Essential hypertension, benign     Senile osteoporosis     Anxiety state     Dermatophytosis of foot

## 2020-02-26 ENCOUNTER — HOSPITAL ENCOUNTER (OUTPATIENT)
Dept: MAMMOGRAPHY | Age: 70
Discharge: HOME | End: 2020-02-28
Payer: MEDICARE

## 2020-02-26 PROCEDURE — 77067 SCR MAMMO BI INCL CAD: CPT

## 2020-06-10 ENCOUNTER — TELEPHONE (OUTPATIENT)
Dept: FAMILY MEDICINE CLINIC | Age: 70
End: 2020-06-10

## 2020-06-10 RX ORDER — AMLODIPINE BESYLATE 5 MG/1
5 TABLET ORAL DAILY
Qty: 90 TABLET | Refills: 1 | Status: SHIPPED | OUTPATIENT
Start: 2020-06-10 | End: 2020-12-07 | Stop reason: SDUPTHER

## 2020-06-12 RX ORDER — LOSARTAN POTASSIUM 100 MG/1
TABLET ORAL
Qty: 90 TABLET | Refills: 1 | Status: SHIPPED | OUTPATIENT
Start: 2020-06-12 | End: 2020-12-07 | Stop reason: SDUPTHER

## 2020-07-02 ENCOUNTER — TELEPHONE (OUTPATIENT)
Dept: PRIMARY CARE CLINIC | Age: 70
End: 2020-07-02

## 2020-07-02 NOTE — TELEPHONE ENCOUNTER
Patient asking for a new script for Metformin - patient uses Drug [facility] Maintenance   Topic Date Due    DTaP/Tdap/Td vaccine (1 - Tdap) 06/19/1970    Shingles Vaccine (2 of 3) 05/26/2014    Diabetic foot exam  07/31/2018    A1C test (Diabetic or Prediabetic)  02/25/2020    Diabetic microalbuminuria test  02/25/2020    Lipid screen  02/25/2020    Potassium monitoring  02/25/2020    Creatinine monitoring  02/25/2020    Flu vaccine (1) 09/01/2020    Annual Wellness Visit (AWV)  11/11/2020    Diabetic retinal exam  06/23/2021    Breast cancer screen  02/26/2022    Colon cancer screen colonoscopy  09/12/2026    DEXA (modify frequency per FRAX score)  Completed    Pneumococcal 65+ years Vaccine  Completed    Hepatitis C screen  Completed    Hepatitis A vaccine  Aged Out    Hib vaccine  Aged Out    Meningococcal (ACWY) vaccine  Aged Out             (applicable per patient's age: Cancer Screenings, Depression Screening, Fall Risk Screening, Immunizations)    Hemoglobin A1C (%)   Date Value   02/25/2019 5.7   01/25/2018 6.4 (H)   10/31/2017 6.2     Microalb/Crt.  Ratio (mcg/mg creat)   Date Value   02/25/2019 CANNOT BE CALCULATED     LDL Cholesterol (mg/dL)   Date Value   02/25/2019 83     AST (U/L)   Date Value   02/25/2019 10     ALT (U/L)   Date Value   02/25/2019 14     BUN (mg/dL)   Date Value   02/25/2019 15      (goal A1C is < 7)   (goal LDL is <100) need 30-50% reduction from baseline     BP Readings from Last 3 Encounters:   11/11/19 139/84   02/27/19 (!) 146/88   08/27/18 (!) 150/80    (goal /80)      All Future Testing planned in [facility]:  Lab Frequency Next Occurrence   CBC Auto Differential Once 11/10/2020   Comprehensive Metabolic Panel Once 62/51/7230   Hemoglobin A1C Once 11/09/2020   Lipid Panel Once 11/10/2020   Vitamin D 25 Hydroxy Once 11/09/2020   TSH with Reflex Once 11/09/2020       Next Visit Date:  Future Appointments   Date Time Provider Soha Rodriguez 7/13/2020  2:30 PM [facility]            Patient Active Problem List:     Type 2 diabetes, HbA1c goal < 7% ([facility] [address]     Pure hypercholesterolemia     Essential hypertension, benign     Senile osteoporosis     Anxiety state     Dermatophytosis of foot

## 2020-07-09 ENCOUNTER — HOSPITAL ENCOUNTER (OUTPATIENT)
Age: 70
Discharge: HOME | End: 2020-07-09
Payer: MEDICARE

## 2020-07-09 LAB
ABSOLUTE EOS #: 0.6 K/UL (ref 0–0.4)
ABSOLUTE IMMATURE GRANULOCYTE: ABNORMAL K/UL (ref 0–0.3)
ABSOLUTE LYMPH #: 2.2 K/UL (ref 1–4.8)
ABSOLUTE MONO #: 0.5 K/UL (ref 0–1)
ALBUMIN SERPL-MCNC: 4.2 G/DL (ref 3.5–5.2)
ALBUMIN/GLOBULIN RATIO: ABNORMAL (ref 1–2.5)
ALP BLD-CCNC: 130 U/L (ref 35–104)
ALT SERPL-CCNC: 16 U/L (ref 5–33)
ANION GAP SERPL CALCULATED.3IONS-SCNC: 12 MMOL/L (ref 9–17)
AST SERPL-CCNC: 12 U/L
BASOPHILS # BLD: 1 % (ref 0–2)
BASOPHILS ABSOLUTE: 0.1 K/UL (ref 0–0.2)
BILIRUB SERPL-MCNC: 0.29 MG/DL (ref 0.3–1.2)
BUN BLDV-MCNC: 13 MG/DL (ref 8–23)
BUN/CREAT BLD: 17 (ref 9–20)
CALCIUM SERPL-MCNC: 10.1 MG/DL (ref 8.6–10.4)
CHLORIDE BLD-SCNC: 99 MMOL/L (ref 98–107)
CHOLESTEROL/HDL RATIO: 3.3
CHOLESTEROL: 127 MG/DL
CO2: 23 MMOL/L (ref 20–31)
CREAT SERPL-MCNC: 0.76 MG/DL (ref 0.5–0.9)
DIFFERENTIAL TYPE: YES
EOSINOPHILS RELATIVE PERCENT: 5 % (ref 0–5)
ESTIMATED AVERAGE GLUCOSE: 157 MG/DL
GFR AFRICAN AMERICAN: >60 ML/MIN
GFR NON-AFRICAN AMERICAN: >60 ML/MIN
GFR SERPL CREATININE-BSD FRML MDRD: ABNORMAL ML/MIN/{1.73_M2}
GFR SERPL CREATININE-BSD FRML MDRD: ABNORMAL ML/MIN/{1.73_M2}
GLUCOSE BLD-MCNC: 175 MG/DL (ref 70–99)
HBA1C MFR BLD: 7.1 % (ref 4–6)
HCT VFR BLD CALC: 39 % (ref 36–46)
HDLC SERPL-MCNC: 39 MG/DL
HEMOGLOBIN: 13 G/DL (ref 12–16)
IMMATURE GRANULOCYTES: ABNORMAL %
LDL CHOLESTEROL: 60 MG/DL (ref 0–130)
LYMPHOCYTES # BLD: 19 % (ref 15–40)
MCH RBC QN AUTO: 24.8 PG (ref 26–34)
MCHC RBC AUTO-ENTMCNC: 33.3 G/DL (ref 31–37)
MCV RBC AUTO: 74.6 FL (ref 80–100)
MONOCYTES # BLD: 4 % (ref 4–8)
NRBC AUTOMATED: ABNORMAL PER 100 WBC
PATIENT FASTING?: YES
PDW BLD-RTO: 15.3 % (ref 12.1–15.2)
PLATELET # BLD: 431 K/UL (ref 140–450)
PLATELET ESTIMATE: ABNORMAL
PMV BLD AUTO: ABNORMAL FL (ref 6–12)
POTASSIUM SERPL-SCNC: 4.4 MMOL/L (ref 3.7–5.3)
RBC # BLD: 5.22 M/UL (ref 4–5.2)
RBC # BLD: ABNORMAL 10*6/UL
SEG NEUTROPHILS: 71 % (ref 47–75)
SEGMENTED NEUTROPHILS ABSOLUTE COUNT: 8.1 K/UL (ref 2.5–7)
SODIUM BLD-SCNC: 134 MMOL/L (ref 135–144)
TOTAL PROTEIN: 8.1 G/DL (ref 6.4–8.3)
TRIGL SERPL-MCNC: 141 MG/DL
TSH SERPL DL<=0.05 MIU/L-ACNC: 3.21 MIU/L (ref 0.3–5)
VITAMIN D 25-HYDROXY: 19.2 NG/ML (ref 30–100)
VLDLC SERPL CALC-MCNC: ABNORMAL MG/DL (ref 1–30)
WBC # BLD: 11.5 K/UL (ref 3.5–11)
WBC # BLD: ABNORMAL 10*3/UL

## 2020-07-09 PROCEDURE — 80061 LIPID PANEL: CPT

## 2020-07-09 PROCEDURE — 82306 VITAMIN D 25 HYDROXY: CPT

## 2020-07-09 PROCEDURE — 85025 COMPLETE CBC W/AUTO DIFF WBC: CPT

## 2020-07-09 PROCEDURE — 84443 ASSAY THYROID STIM HORMONE: CPT

## 2020-07-09 PROCEDURE — 36415 COLL VENOUS BLD VENIPUNCTURE: CPT

## 2020-07-09 PROCEDURE — 83036 HEMOGLOBIN GLYCOSYLATED A1C: CPT

## 2020-07-09 PROCEDURE — 80053 COMPREHEN METABOLIC PANEL: CPT

## 2020-07-13 ENCOUNTER — OFFICE VISIT (OUTPATIENT)
Dept: FAMILY MEDICINE CLINIC | Age: 70
End: 2020-07-13
Payer: MEDICARE

## 2020-07-13 VITALS
DIASTOLIC BLOOD PRESSURE: 80 MMHG | HEART RATE: 101 BPM | WEIGHT: 201 LBS | BODY MASS INDEX: 35.61 KG/M2 | SYSTOLIC BLOOD PRESSURE: 146 MMHG | OXYGEN SATURATION: 98 % | HEIGHT: 63 IN | TEMPERATURE: 99 F

## 2020-07-13 PROCEDURE — 1090F PRES/ABSN URINE INCON ASSESS: CPT | Performed by: NURSE PRACTITIONER

## 2020-07-13 PROCEDURE — 1123F ACP DISCUSS/DSCN MKR DOCD: CPT | Performed by: NURSE PRACTITIONER

## 2020-07-13 PROCEDURE — G8399 PT W/DXA RESULTS DOCUMENT: HCPCS | Performed by: NURSE PRACTITIONER

## 2020-07-13 PROCEDURE — 99214 OFFICE O/P EST MOD 30 MIN: CPT | Performed by: NURSE PRACTITIONER

## 2020-07-13 PROCEDURE — 1036F TOBACCO NON-USER: CPT | Performed by: NURSE PRACTITIONER

## 2020-07-13 PROCEDURE — 2022F DILAT RTA XM EVC RTNOPTHY: CPT | Performed by: NURSE PRACTITIONER

## 2020-07-13 PROCEDURE — 3017F COLORECTAL CA SCREEN DOC REV: CPT | Performed by: NURSE PRACTITIONER

## 2020-07-13 PROCEDURE — G8417 CALC BMI ABV UP PARAM F/U: HCPCS | Performed by: NURSE PRACTITIONER

## 2020-07-13 PROCEDURE — 4040F PNEUMOC VAC/ADMIN/RCVD: CPT | Performed by: NURSE PRACTITIONER

## 2020-07-13 PROCEDURE — G8427 DOCREV CUR MEDS BY ELIG CLIN: HCPCS | Performed by: NURSE PRACTITIONER

## 2020-07-13 PROCEDURE — 3051F HG A1C>EQUAL 7.0%<8.0%: CPT | Performed by: NURSE PRACTITIONER

## 2020-07-13 ASSESSMENT — ENCOUNTER SYMPTOMS
SHORTNESS OF BREATH: 0
COUGH: 0
WHEEZING: 0
ABDOMINAL DISTENTION: 0
RHINORRHEA: 0
EYES NEGATIVE: 1
SORE THROAT: 0

## 2020-07-13 NOTE — PATIENT INSTRUCTIONS

## 2020-07-13 NOTE — PROGRESS NOTES
CALCULATED 02/25/2019    CREATININE 0.76 07/09/2020     Lab Results   Component Value Date    ALT 16 07/09/2020    AST 12 07/09/2020     Lab Results   Component Value Date    CHOL 127 07/09/2020    TRIG 141 07/09/2020    HDL 39 (L) 07/09/2020          Review of Systems   Constitutional: Negative for activity change, appetite change, chills and fever. HENT: Negative for congestion, rhinorrhea and sore throat. Eyes: Negative. Respiratory: Negative for cough, shortness of breath and wheezing. Cardiovascular: Negative for chest pain and leg swelling. Gastrointestinal: Negative for abdominal distention. Genitourinary: Negative for difficulty urinating. Musculoskeletal: Positive for arthralgias (left knee, right ankle). Neurological: Negative for dizziness. Psychiatric/Behavioral: The patient is not nervous/anxious. Prior to Visit Medications    Medication Sig Taking?  Authorizing Provider   metFORMIN (GLUCOPHAGE) 1000 MG tablet Take 1 tablet by mouth 2 times daily (with meals) Yes Shannon Ni, APRN - CNP   losartan (COZAAR) 100 MG tablet TAKE 1 TABLET BY MOUTH IN THE MORNING Yes Shannon Ni, APRN - CNP   amLODIPine (NORVASC) 5 MG tablet Take 1 tablet by mouth daily Yes Shannon Ni, APRN - CNP   venlafaxine (EFFEXOR XR) 75 MG extended release capsule Take 1 capsule by mouth daily Yes Shannon Ni, APRN - CNP   atorvastatin (LIPITOR) 20 MG tablet Take 1 tablet by mouth daily Yes Shannon Ni, APRN - CNP   celecoxib (CELEBREX) 100 MG capsule Take 1 capsule by mouth 2 times daily Yes Shannon Ni, APRN - CNP   aspirin 81 MG tablet Take 81 mg by mouth daily Yes Historical Provider, MD   Cholecalciferol (VITAMIN D PO) Take by mouth Yes Historical Provider, MD   glucose blood VI test strips (ASCENSIA AUTODISC VI;ONE TOUCH ULTRA TEST VI) strip Test sugar Bid and as needed Yes Shannon Ni, APRN - CNP        Social History     Tobacco Use    Smoking status: Never Smoker    Smokeless tobacco: Never Used    Tobacco comment: hx smoking-6 years 1/2 ppd or less   Substance Use Topics    Alcohol use: No        Vitals:    07/13/20 1415 07/13/20 1429   BP: (!) 154/100 (!) 146/80   Site: Right Upper Arm Left Upper Arm   Pulse: 101    Temp: 99 °F (37.2 °C)    TempSrc: Oral    SpO2: 98%    Weight: 201 lb (91.2 kg)    Height: 5' 3\" (1.6 m)      Estimated body mass index is 35.61 kg/m² as calculated from the following:    Height as of this encounter: 5' 3\" (1.6 m). Weight as of this encounter: 201 lb (91.2 kg). Physical Exam  Vitals signs and nursing note reviewed. Constitutional:       General: She is not in acute distress. Appearance: Normal appearance. She is well-developed. HENT:      Head: Normocephalic and atraumatic. Right Ear: Tympanic membrane and external ear normal.      Left Ear: Tympanic membrane and external ear normal.      Nose: No congestion. Mouth/Throat:      Mouth: Mucous membranes are moist.   Eyes:      General: No scleral icterus. Pupils: Pupils are equal, round, and reactive to light. Neck:      Musculoskeletal: Normal range of motion and neck supple. Vascular: No carotid bruit (neg jean). Cardiovascular:      Rate and Rhythm: Normal rate and regular rhythm. Heart sounds: Normal heart sounds. No murmur. Pulmonary:      Effort: Pulmonary effort is normal. No respiratory distress. Breath sounds: Normal breath sounds. No wheezing. Abdominal:      Palpations: Abdomen is soft. Tenderness: There is no abdominal tenderness. Musculoskeletal: Normal range of motion. General: No tenderness. Right lower leg: No edema. Left lower leg: No edema. Lymphadenopathy:      Cervical: No cervical adenopathy. Skin:     General: Skin is warm and dry. Neurological:      Mental Status: She is alert and oriented to person, place, and time. Psychiatric:         Behavior: Behavior normal.         Thought Content:  Thought content normal. Judgment: Judgment normal.         ASSESSMENT/PLAN:  1. Essential hypertension, benign  Elevated today   Nervous with going out of home. Wearing mask in office. On losartan and amlodipine normally controlled keeps log at home. 2. Anxiety and depression  On effexor   Stable  No suicidal ideations  Lives alone  Has good communication with son. Missing her mother whom she is not able to visit in nursing home due to covid restrictions. 3. Pure hypercholesterolemia  Well controlled  Continue statin lipitor      4. Class 2 severe obesity due to excess calories with serious comorbidity and body mass index (BMI) of 37.0 to 37.9 in [facility])  Reduce calories. 5. Osteoarthritis of left knee, unspecified osteoarthritis type  Not using celebrex but rarely. 6. Controlled type 2 diabetes mellitus without complication, without long-term current use of insulin (HCC)  Elevation in glyco at 7.1 still remains close to goal.     Suggested GLp-1 agonist pt will check with insurance. glyco in 3 months with medicare visit please. Return in about 6 months (around 1/13/2021). An electronic signature was used to authenticate this note.     --DANA Cárdenas - CNP on 7/14/2020 at 12:30 AM

## 2020-08-05 RX ORDER — VENLAFAXINE HYDROCHLORIDE 75 MG/1
75 CAPSULE, EXTENDED RELEASE ORAL DAILY
Qty: 90 CAPSULE | Refills: 1 | Status: SHIPPED | OUTPATIENT
Start: 2020-08-05 | End: 2020-11-10

## 2020-08-05 RX ORDER — ATORVASTATIN CALCIUM 20 MG/1
20 TABLET, FILM COATED ORAL DAILY
Qty: 90 TABLET | Refills: 1 | Status: SHIPPED | OUTPATIENT
Start: 2020-08-05 | End: 2020-11-10

## 2020-10-14 ENCOUNTER — TELEPHONE (OUTPATIENT)
Dept: FAMILY MEDICINE CLINIC | Age: 70
End: 2020-10-14

## 2020-10-14 ENCOUNTER — NURSE ONLY (OUTPATIENT)
Dept: FAMILY MEDICINE CLINIC | Age: 70
End: 2020-10-14
Payer: MEDICARE

## 2020-10-14 LAB — HBA1C MFR BLD: 6.4 %

## 2020-10-14 PROCEDURE — 83036 HEMOGLOBIN GLYCOSYLATED A1C: CPT | Performed by: NURSE PRACTITIONER

## 2020-10-14 NOTE — TELEPHONE ENCOUNTER
Patient came in today for an A1C. States that she has had a lot going on lately and just lost her mother. She is having trouble with her anxiety and depression and would like to know if she can increase her dose of effexor. Patient notified that [facility] will contact her with her A1C results and information regarding her effexor. Last visit:  7/13/20  Next Visit Date:    Future Appointments   Date Time Provider Soha Rodriguez   10/14/2020  1:15 PM MARIANNE Valencia [facility]   12/7/2020  1:30 PM Ethyl Dimes, APRN - CNP [facility]     Last Med refill:    Medication List:  Prior to Admission medications    Medication Sig Start Date End Date Taking? Authorizing Provider   venlafaxine (EFFEXOR XR) 75 MG extended release capsule TAKE 1 CAPSULE BY MOUTH DAILY 8/5/20   Ethyl Dimes, APRN - CNP   atorvastatin (LIPITOR) 20 MG tablet TAKE 1 TABLET BY MOUTH DAILY 8/5/20   Ethyl Dimes, APRN - CNP   metFORMIN (GLUCOPHAGE) 1000 MG tablet Take 1 tablet by mouth 2 times daily (with meals) 7/2/20   Ethyl Dimes, APRN - CNP   losartan (COZAAR) 100 MG tablet TAKE 1 TABLET BY MOUTH IN THE MORNING 6/12/20   Ethyl Dimes, APRN - CNP   amLODIPine (NORVASC) 5 MG tablet Take 1 tablet by mouth daily 6/10/20   Ethyl Dimes, APRN - CNP   celecoxib (CELEBREX) 100 MG capsule Take 1 capsule by mouth 2 times daily 2/27/19   Ethyl Dimes, APRN - CNP   aspirin 81 MG tablet Take 81 mg by mouth daily    Historical Provider, MD   Cholecalciferol (VITAMIN D PO) Take by mouth    Historical Provider, MD   glucose blood VI test strips (ASCENSIA AUTODISC VI;ONE TOUCH ULTRA TEST VI) strip Test sugar Bid and as needed 8/11/17   Ethyl Dimes, APRN - CNP       Allergies:  Penicillins and Dust mite extract    Hemoglobin A1C (%)   Date Value   07/09/2020 7.1 (H)   02/25/2019 5.7   01/25/2018 6.4 (H)             ( goal A1C is < 7)   Microalb/Crt.  Ratio (mcg/mg creat)   Date Value   02/25/2019 CANNOT BE CALCULATED     LDL Cholesterol (mg/dL)   Date Value   07/09/2020 60   02/25/2019 83       (goal LDL is <100)   AST (U/L)   Date Value   07/09/2020 12     ALT (U/L)   Date Value   07/09/2020 16     BUN (mg/dL)   Date Value   07/09/2020 13     BP Readings from Last 3 Encounters:   07/13/20 (!) 146/80   11/11/19 139/84   02/27/19 (!) 146/88          (goal 120/80)

## 2020-10-15 RX ORDER — VENLAFAXINE HYDROCHLORIDE 37.5 MG/1
37.5 CAPSULE, EXTENDED RELEASE ORAL DAILY
Qty: 30 CAPSULE | Refills: 0 | Status: SHIPPED | OUTPATIENT
Start: 2020-10-15 | End: 2020-12-07 | Stop reason: ALTCHOICE

## 2020-10-15 NOTE — TELEPHONE ENCOUNTER
Inform patient A1C is great at 6.4 well controlled. I am sorry about the loss of her mother. The effexor XR 75 mg tab she has cannot be cut but I can order the effexor XR 37.5 mg dose which is a 2nd pill of the same med that she can add to her current dose which should help with the depression and anxiety. Would appreciate appt within 2 weeks please. And make sure she is not having any thoughts of self harm. [facility]

## 2020-11-04 ENCOUNTER — TELEPHONE (OUTPATIENT)
Dept: FAMILY MEDICINE CLINIC | Age: 70
End: 2020-11-04

## 2020-11-04 NOTE — TELEPHONE ENCOUNTER
Noted and okay
Date:  Future Appointments   Date Time Provider Soha Rodriguez   12/7/2020  1:30 PM Tyrell Barnhart, APRN - [facility]            Patient Active Problem List:     Type 2 diabetes, HbA1c goal < 7% ([address]     Pure hypercholesterolemia     Essential hypertension, benign     Senile osteoporosis     Anxiety state     Dermatophytosis of foot

## 2020-11-10 RX ORDER — ATORVASTATIN CALCIUM 20 MG/1
TABLET, FILM COATED ORAL
Qty: 90 TABLET | Refills: 1 | Status: SHIPPED | OUTPATIENT
Start: 2020-11-10 | End: 2021-03-08 | Stop reason: SDUPTHER

## 2020-11-10 RX ORDER — VENLAFAXINE HYDROCHLORIDE 75 MG/1
CAPSULE, EXTENDED RELEASE ORAL
Qty: 90 CAPSULE | Refills: 1 | Status: SHIPPED | OUTPATIENT
Start: 2020-11-10 | End: 2021-03-08 | Stop reason: SDUPTHER

## 2020-12-07 ENCOUNTER — OFFICE VISIT (OUTPATIENT)
Dept: FAMILY MEDICINE CLINIC | Age: 70
End: 2020-12-07
Payer: MEDICARE

## 2020-12-07 VITALS
WEIGHT: 191 LBS | HEIGHT: 62 IN | HEART RATE: 99 BPM | OXYGEN SATURATION: 96 % | BODY MASS INDEX: 35.15 KG/M2 | SYSTOLIC BLOOD PRESSURE: 120 MMHG | DIASTOLIC BLOOD PRESSURE: 84 MMHG

## 2020-12-07 PROCEDURE — G8484 FLU IMMUNIZE NO ADMIN: HCPCS | Performed by: NURSE PRACTITIONER

## 2020-12-07 PROCEDURE — 3017F COLORECTAL CA SCREEN DOC REV: CPT | Performed by: NURSE PRACTITIONER

## 2020-12-07 PROCEDURE — 1123F ACP DISCUSS/DSCN MKR DOCD: CPT | Performed by: NURSE PRACTITIONER

## 2020-12-07 PROCEDURE — 2022F DILAT RTA XM EVC RTNOPTHY: CPT | Performed by: NURSE PRACTITIONER

## 2020-12-07 PROCEDURE — 1036F TOBACCO NON-USER: CPT | Performed by: NURSE PRACTITIONER

## 2020-12-07 PROCEDURE — 99214 OFFICE O/P EST MOD 30 MIN: CPT | Performed by: NURSE PRACTITIONER

## 2020-12-07 PROCEDURE — 4040F PNEUMOC VAC/ADMIN/RCVD: CPT | Performed by: NURSE PRACTITIONER

## 2020-12-07 PROCEDURE — G8427 DOCREV CUR MEDS BY ELIG CLIN: HCPCS | Performed by: NURSE PRACTITIONER

## 2020-12-07 PROCEDURE — G8399 PT W/DXA RESULTS DOCUMENT: HCPCS | Performed by: NURSE PRACTITIONER

## 2020-12-07 PROCEDURE — 3044F HG A1C LEVEL LT 7.0%: CPT | Performed by: NURSE PRACTITIONER

## 2020-12-07 PROCEDURE — G0439 PPPS, SUBSEQ VISIT: HCPCS | Performed by: NURSE PRACTITIONER

## 2020-12-07 PROCEDURE — G8417 CALC BMI ABV UP PARAM F/U: HCPCS | Performed by: NURSE PRACTITIONER

## 2020-12-07 PROCEDURE — 1090F PRES/ABSN URINE INCON ASSESS: CPT | Performed by: NURSE PRACTITIONER

## 2020-12-07 RX ORDER — LOSARTAN POTASSIUM 100 MG/1
100 TABLET ORAL DAILY
Qty: 90 TABLET | Refills: 1 | Status: SHIPPED | OUTPATIENT
Start: 2020-12-07 | End: 2021-06-14 | Stop reason: SDUPTHER

## 2020-12-07 RX ORDER — AMLODIPINE BESYLATE 5 MG/1
5 TABLET ORAL DAILY
Qty: 90 TABLET | Refills: 1 | Status: SHIPPED | OUTPATIENT
Start: 2020-12-07 | End: 2021-03-08 | Stop reason: SDUPTHER

## 2020-12-07 ASSESSMENT — PATIENT HEALTH QUESTIONNAIRE - PHQ9
SUM OF ALL RESPONSES TO PHQ QUESTIONS 1-9: 0
SUM OF ALL RESPONSES TO PHQ QUESTIONS 1-9: 0
1. LITTLE INTEREST OR PLEASURE IN DOING THINGS: 0
2. FEELING DOWN, DEPRESSED OR HOPELESS: 0
SUM OF ALL RESPONSES TO PHQ9 QUESTIONS 1 & 2: 0
SUM OF ALL RESPONSES TO PHQ QUESTIONS 1-9: 0

## 2020-12-07 ASSESSMENT — LIFESTYLE VARIABLES: HOW OFTEN DO YOU HAVE A DRINK CONTAINING ALCOHOL: 0

## 2020-12-07 NOTE — PATIENT INSTRUCTIONS
You may be receiving a survey from [employer] regarding your visit today. You may get this in the mail, through your MyChart or in your email. Please complete the survey to enable us to provide the highest quality of care to you and your family. If you cannot score us as very good ( 5 Stars) on any question, please feel free to call the office to discuss how we could have made your experience exceptional.     Thank You! DANA Harris-ANNETTE Brito      Update Tdap (tetanus/diptheria/pertussis) vaccine at local pharmacy      Preventing falls is a special concern due to an increase in the likelihood of fracturing a bone. Factors affecting FALLS include: environmental factors, impaired vision or balance, chronic diseases that affect mental or physical functioning and certain medications (sedatives, antidepressants, benzodiazepines, narcotics and alcohol). Here are some tips to eliminate environmental factors that can lead to falls    Outdoors:  -use a cane or walker for added stability  -wear rubber-soled shoes for traction  -walk on grass when sidewalks are slippery  -In winter, carry salt or sampson litter to sprinkle on slippery sidewalks.   -be careful on highly polished floors that become slick and dangerous when wet  -walk on even or level ground when able    Indoors:  -Keep rooms free of clutter, especially the floors  -Keep floor surfaces smooth but not slippery  -Wear supportive, low-heeled shoes even at home  -Avoid walking in socks, stockings or slippers.  -Be sure carpets and area rugs have skid-proof backing or are tacked to the floor  -Install grab bars on the bathroom walls near tub, shower and toilet. -Use a rubber bath mat in shower or tub  -Keep a flashlight with fresh batteries beside your bed.   -If using a step stool for hard-to-reach places, use a sturdy one with a handrails on both sides.   OR  As a family member to help.   -Add ceiling fixtures to rooms lit by lamps.   -Consider purchasing a cordless phone so that you do not have to rush to answer the phone when it rings, or so that you can call for help if you do fall.

## 2020-12-07 NOTE — PROGRESS NOTES
Medicare Annual Wellness Visit  Name: Juany Gallagher Date: 2020   MRN: [id number] Sex: Female   Age: 72 y.o. Ethnicity: Non-/Non    : 1950 Race: Farzaneh Bray is here for Medicare AWV    Screenings for behavioral, psychosocial and functional/safety risks, and cognitive dysfunction are all negative except as indicated below. These results, as well as other patient data from the [address] form, are documented in Flowsheets linked to this Encounter. Allergies   Allergen Reactions    Penicillins Hives    Dust Mite Extract Other (See Comments)     headache         Prior to Visit Medications    Medication Sig Taking?  Authorizing Provider   amLODIPine (NORVASC) 5 MG tablet Take 1 tablet by mouth daily hypertension Yes DANA Chatterjee CNP   losartan (COZAAR) 100 MG tablet Take 1 tablet by mouth daily For hypertension/kidney protection with diabetes Yes DANA Chatterjee CNP   metFORMIN (GLUCOPHAGE) 1000 MG tablet Take 1 tablet by mouth 2 times daily (with meals) Yes DANA Chatterjee CNP   atorvastatin (LIPITOR) 20 MG tablet TAKE 1 TABLET BY MOUTH EVERY DAY Yes DANA Chatterjee CNP   venlafaxine (EFFEXOR XR) 75 MG extended release capsule TAKE 1 CAPSULE BY MOUTH EVERY DAY Yes DANA Chatterjee CNP   aspirin 81 MG tablet Take 81 mg by mouth daily Yes Historical Provider, MD   Cholecalciferol (VITAMIN D PO) Take by mouth Yes Historical Provider, MD   glucose blood VI test strips (ASCENSIA AUTODISC VI;ONE TOUCH ULTRA TEST VI) strip Test sugar Bid and as needed Yes DANA Chatterjee CNP         Past Medical History:   Diagnosis Date    Anxiety     Hyperlipidemia     Hypertension     onset early 15'S    Lichen simplex 7305    shoulders and right leg    Osteopenia 2011    femoral necks jean -1.9, -1.8       Past Surgical History:   Procedure Laterality Date    CHOLECYSTECTOMY      age 35   [facility] COLON SURGERY      colon resection bowel blockage/adhesions-gangrene    DENTAL SURGERY      x3 pulled    HYSTERECTOMY      kept ovaries         Family History   Problem Relation Age of Onset    Hypertension Mother     Dementia Mother     Hypertension Father     Heart Disease Father         stroke during heart surgery     High Cholesterol Father     Heart Disease Brother         cath with stents    Heart Attack Maternal Grandmother     Other Paternal Grandmother         cerbral aneurysm    Alcohol Abuse Brother     Cancer Brother         kidney       CareTeam (Including outside providers/suppliers regularly involved in providing care):   Patient Care Team:  DANA Goel CNP as PCP - General (Nurse Practitioner)  DANA Goel CNP as PCP - [facility] Carlos Irvingled Provider  Samina Stewart RN as Registered Nurse    Wt Readings from Last 3 Encounters:   20 191 lb (86.6 kg)   20 201 lb (91.2 kg)   19 213 lb (96.6 kg)     Vitals:    20 1328   BP: 120/84   Site: Left Upper Arm   Position: Sitting   Cuff Size: Large Adult   Pulse: 99   SpO2: 96%   Weight: 191 lb (86.6 kg)   Height: 5' 2\" (1.575 m)     Body mass index is 34.93 kg/m². Based upon direct observation of the patient, evaluation of cognition reveals recent and remote memory intact.     General Appearance: alert and oriented to person, place and time, well developed and well- nourished, in no acute distress  Skin: warm and dry, no rash or erythema  Head: normocephalic and atraumatic  Eyes: pupils equal, round, and reactive to light, extraocular eye movements intact, conjunctivae normal  ENT: tympanic membrane, external ear and ear canal normal bilaterally, nose without deformity,   Neck: supple and non-tender without mass, no thyromegaly or thyroid nodules, no cervical lymphadenopathy  Pulmonary/Chest: clear to auscultation bilaterally- no wheezes, rales or rhonchi, normal air movement, no respiratory distress  Cardiovascular: normal rate, regular rhythm, normal S1 and S2, no murmurs, rubs, clicks, or gallops, distal pulses intact, no carotid bruits  Abdomen: soft, non-tender, non-distended, normal bowel sounds, no masses or organomegaly  Extremities: no cyanosis, clubbing or edema  Musculoskeletal: normal range of motion, no joint swelling, deformity or tenderness  Neurologic:gait, coordination and speech normal    Patient's complete Health Risk Assessment and screening values have been reviewed and are found in Flowsheets. The following problems were reviewed today and where indicated follow up appointments were made and/or referrals ordered. Positive Risk Factor Screenings with Interventions:       General Health and ACP:  General  In general, how would you say your health is?: Very Good  In the past 7 days, have you experienced any of the following? New or Increased Pain, New or Increased Fatigue, Loneliness, Social Isolation, Stress or Anger?: (!) Loneliness, Social Isolation  Do you get the social and emotional support that you need?: Yes  Do you have a Living Will?: (!) No  Advance Directives     Power of [facility] Will ACP-Advance Directive ACP-Power of     Not on File Not on File [address] [facility] Risk Interventions:  · No Living Will: Advance Care Planning addressed with patient today and [facility] Habits/Nutrition:  Health Habits/Nutrition  Do you exercise for at least 20 minutes 2-3 times per week?: (!) No  Have you lost any weight without trying in the past 3 months?: No  Do you eat fewer than 2 meals per day?: No  Have you seen a dentist within the past year?: Yes  Body mass index: (!) 34.93  Health Habits/Nutrition Interventions:  · Inadequate physical activity:  walks dog   · Nutritional issues:  educational materials for healthy, well-balanced diet provided  · Dental exam overdue:  friend Marcus Garcia and her go once a week and eat, yogurt apples peanut butter. Safety:  Safety  Do you have working smoke detectors?: Yes  Have all throw rugs been removed or fastened?: (!) No  Do you have non-slip mats or surfaces in all bathtubs/showers?: Yes  Do all of your stairways have a railing or banister?: Yes  Are your doorways, halls and stairs free of clutter?: Yes  Do you always fasten your seatbelt when you are in a car?: Yes  Safety Interventions:  · Home safety tips provided    Personalized Preventive Plan   Current Health Maintenance Status  Immunization History   Administered Date(s) Administered    Influenza Virus Vaccine 12/08/2014    Influenza, Nicole Half, IM, PF (6 mo and older Fluzone, Flulaval, Fluarix, and 3 yrs and older Afluria) 11/01/2017, 01/29/2018    Influenza, Quadv, adjuvanted, 65 yrs +, IM, PF (Fluad) 09/13/2020    Influenza, Triv, inactivated, subunit, adjuvanted, IM (Fluad 65 yrs and older) 10/31/2018, 11/07/2019    Pneumococcal Conjugate 13-valent ([id number]) 08/03/2016    Pneumococcal Polysaccharide ([id number]) 03/31/2014, 11/01/2017, 11/11/2019    Zoster Live (Zostavax) 03/31/2014        Health Maintenance   Topic Date Due    DTaP/Tdap/Td vaccine (1 - Tdap) 06/19/1970    Shingles Vaccine (2 of 3) 05/26/2014    Diabetic foot exam  07/31/2018    Annual Wellness Visit (AWV)  05/29/2019    Diabetic microalbuminuria test  02/25/2020    Diabetic retinal exam  06/23/2021    Lipid screen  07/09/2021    Potassium monitoring  07/09/2021    Creatinine monitoring  07/09/2021    A1C test (Diabetic or Prediabetic)  10/14/2021    Breast cancer screen  02/26/2022    Colon cancer screen colonoscopy  09/12/2026    DEXA (modify frequency per FRAX score)  Completed    Flu vaccine  Completed    Pneumococcal 65+ years Vaccine  Completed    Hepatitis C screen  Completed    Hepatitis A vaccine  Aged Out    Hib vaccine  Aged Out    Meningococcal (ACWY) vaccine  Aged Out     Recommendations for ELERTS Due: see orders and patient instructions/AVS.  . Recommended screening schedule for the next 5-10 years is provided to the patient in written form: see Patient Natalie Huang was seen today for medicare awv. Diagnoses and all orders for this visit:    Routine general medical examination at a health care facility    Advanced directives, counseling/discussion  -     Blanca Referral to [address] [place] [address] Specialist    Unintentional weight loss of more than 10 pounds  -     Juany Rodriguez MD, General Surgery, [facility]    Pure hypercholesterolemia    Anxiety and depression    Controlled type 2 diabetes mellitus without complication, without long-term current use of insulin [facility])  -     Juany Rodriguez MD, General Surgery, [facility], ; Future    Essential hypertension, benign  -     losartan (COZAAR) 100 MG tablet; Take 1 tablet by mouth daily For hypertension/kidney protection with diabetes    Colon cancer screening  -     Blanca - Amber Marin MD, General Surgery, [facility]    Other orders  -     amLODIPine (NORVASC) 5 MG tablet; Take 1 tablet by mouth daily hypertension  -     metFORMIN (GLUCOPHAGE) 1000 MG tablet; Take 1 tablet by mouth 2 times daily (with meals)                 2020     Kitty Hernandez (:  1950) is a 72 y.o. female, here for evaluation of the following medical concerns:    HPI     Wt Readings from Last 3 Encounters:   20 191 lb (86.6 kg)   20 201 lb (91.2 kg)   19 213 lb (96.6 kg)     22 pounds lost in last year. She eats out at [facility] once or twice a week with her friend. Otherwise snacking and not cooking much. Denies  sugar lows. Hx diverticulosis in past with difficult colonoscopy , hx bowel resection due to colon obstruction. No family hx colon cancer. 5 years since last scope Dr. Frank Matt, would like to be referred to Dr. Viridiana Villegas locally . Denies any blood in stool, no nausea/vomiting.        Treatment Adherence:   Medication compliance: compliant all of the time  Diet compliance:  compliant all of the time  Weight trend: decreasing  Current exercise: walking her dog sometimes,   Barriers: overwhelmed by complexity of regimen, stress and time constraints    Diabetes Mellitus Type 2: Current symptoms/problems include none. Home blood sugar records: fasting range: 130  Any episodes of hypoglycemia? no  Eye exam current (within one year): yes  Tobacco history: She  reports that she has never smoked. She has never used smokeless tobacco.   Daily Aspirin? Yes    Hypertension:  Home blood pressure monitoring: No.  She is adherent to a low sodium diet. Patient denies chest pain, shortness of breath, headache, lightheadedness, blurred vision, peripheral edema and palpitations. Antihypertensive medication side effects: no medication side effects noted. Use of agents associated with hypertension: none. Hyperlipidemia:  No new myalgias or GI upset on atorvastatin (Lipitor). Lab Results   Component Value Date    LABA1C 6.4 10/14/2020    LABA1C 7.1 (H) 07/09/2020    LABA1C 5.7 02/25/2019     Lab Results   Component Value Date    LABMICR CANNOT BE CALCULATED 02/25/2019    CREATININE 0.76 07/09/2020     Lab Results   Component Value Date    ALT 16 07/09/2020    AST 12 07/09/2020     Lab Results   Component Value Date    CHOL 127 07/09/2020    TRIG 141 07/09/2020    HDL 39 (L) 07/09/2020          Review of Systems   Constitutional: Negative for activity change, chills and fever. HENT: Negative for congestion, postnasal drip and rhinorrhea. Eyes: Negative. Respiratory: Negative for cough, shortness of breath and wheezing. Cardiovascular: Negative for chest pain and palpitations. Gastrointestinal: Negative for abdominal distention, blood in stool, constipation, diarrhea, nausea and vomiting. Endocrine: Negative for cold intolerance and heat intolerance. Genitourinary: Negative for difficulty urinating.    Musculoskeletal: Negative for arthralgias. Skin: Negative for rash. Neurological: Negative for dizziness and headaches. Psychiatric/Behavioral: Negative for agitation, self-injury, sleep disturbance and suicidal ideas. The patient is nervous/anxious. Prior to Visit Medications    Medication Sig Taking? Authorizing Provider   amLODIPine (NORVASC) 5 MG tablet Take 1 tablet by mouth daily hypertension Yes DANA Rodrigues CNP   losartan (COZAAR) 100 MG tablet Take 1 tablet by mouth daily For hypertension/kidney protection with diabetes Yes DANA Rodrigues CNP   metFORMIN (GLUCOPHAGE) 1000 MG tablet Take 1 tablet by mouth 2 times daily (with meals) Yes DANA Rodrigues CNP   atorvastatin (LIPITOR) 20 MG tablet TAKE 1 TABLET BY MOUTH EVERY DAY Yes DANA Rodrigues CNP   venlafaxine (EFFEXOR XR) 75 MG extended release capsule TAKE 1 CAPSULE BY MOUTH EVERY DAY Yes DANA Rodrigues CNP   aspirin 81 MG tablet Take 81 mg by mouth daily Yes Historical Provider, MD   Cholecalciferol (VITAMIN D PO) Take by mouth Yes Historical Provider, MD   glucose blood VI test strips (ASCENSIA AUTODISC VI;ONE TOUCH ULTRA TEST VI) strip Test sugar Bid and as needed Yes DANA Rodrigues CNP        Social History     Tobacco Use    Smoking status: Never Smoker    Smokeless tobacco: Never Used    Tobacco comment: hx smoking-6 years 1/2 ppd or less   Substance Use Topics    Alcohol use: No        Vitals:    12/07/20 1328   BP: 120/84   Site: Left Upper Arm   Position: Sitting   Cuff Size: Large Adult   Pulse: 99   SpO2: 96%   Weight: 191 lb (86.6 kg)   Height: 5' 2\" (1.575 m)     Estimated body mass index is 34.93 kg/m² as calculated from the following:    Height as of this encounter: 5' 2\" (1.575 m). Weight as of this encounter: 191 lb (86.6 kg). Physical Exam  Vitals signs and nursing note reviewed. Constitutional:       General: She is not in acute distress. Appearance: Normal appearance. She is well-developed. HENT:      Head: Normocephalic and atraumatic. Right Ear: Tympanic membrane and external ear normal.      Left Ear: Tympanic membrane and external ear normal.      Nose: No congestion. Mouth/Throat:      Mouth: Mucous membranes are moist.   Eyes:      General: No scleral icterus. Pupils: Pupils are equal, round, and reactive to light. Neck:      Musculoskeletal: Normal range of motion and neck supple. Vascular: No carotid bruit (neg jean). Cardiovascular:      Rate and Rhythm: Normal rate and regular rhythm. Heart sounds: Normal heart sounds. No murmur. Pulmonary:      Effort: Pulmonary effort is normal. No respiratory distress. Breath sounds: Normal breath sounds. No wheezing. Abdominal:      General: Bowel sounds are normal. There is no distension. Palpations: Abdomen is soft. There is no mass. Tenderness: There is no abdominal tenderness. Hernia: No hernia is present. Comments: No hepatospleenomegaly   Genitourinary:     Rectum: Guaiac result negative. Musculoskeletal: Normal range of motion. Lymphadenopathy:      Cervical: No cervical adenopathy. Skin:     General: Skin is warm and dry. Neurological:      Mental Status: She is alert and oriented to person, place, and time. Psychiatric:         Behavior: Behavior normal.         Thought Content: Thought content normal.         Judgment: Judgment normal.           1. Unintentional weight loss of more than 10 pounds  Concerning for amount of loss. Pt with some depression worsening with recent loss of her mother. Tried to increase effexor but caused too much drowsiness. Needs better eating habits. Discussed more routine Ivory Vazquez MD, General Surgery, [facility]    4. Pure hypercholesterolemia  Well controlled  On statin  Lab Results   Component Value Date    LDLCHOLESTEROL 60 07/09/2020         5.  Anxiety and depression  Continue effexor 75 mg daily dose  Declines counseling  Sons are close and speaks with granddaughter often. Also discussed sexual activity with male friend who already had covid risks discussed with masking, condom use. Return for Medicare Annual Wellness Visit in 1 year. An electronic signature was used to authenticate this note.     --DANA Campbell - CNP on 12/7/2020 at 2:15 PM

## 2020-12-08 ENCOUNTER — HOSPITAL ENCOUNTER (OUTPATIENT)
Age: 70
Setting detail: SPECIMEN
Discharge: HOME | End: 2020-12-08
Payer: MEDICARE

## 2020-12-08 LAB
CREATININE URINE: 113.5 MG/DL (ref 28–217)
MICROALBUMIN/CREAT 24H UR: <12 MG/L
MICROALBUMIN/CREAT UR-RTO: NORMAL MCG/MG CREAT

## 2020-12-08 PROCEDURE — 82570 ASSAY OF URINE CREATININE: CPT

## 2020-12-08 PROCEDURE — 82043 UR ALBUMIN QUANTITATIVE: CPT

## 2020-12-08 ASSESSMENT — ENCOUNTER SYMPTOMS
VOMITING: 0
WHEEZING: 0
SHORTNESS OF BREATH: 0
COUGH: 0
DIARRHEA: 0
RHINORRHEA: 0
EYES NEGATIVE: 1
CONSTIPATION: 0
ABDOMINAL DISTENTION: 0
BLOOD IN STOOL: 0
NAUSEA: 0

## 2020-12-14 ENCOUNTER — TELEPHONE (OUTPATIENT)
Dept: PALLATIVE CARE | Age: 70
End: 2020-12-14

## 2020-12-14 NOTE — TELEPHONE ENCOUNTER
ACP Specialist Note    Discussion with Alysha Copeland regarding referral for ACP Specialist.  She is familiar with the documents and is interested in completing them. Appointment made for Wednesday December 16 at 1:00 PM with  in [facility]. Writer will also be available if  cannot meet at this time. Ana Pedroza RN, [facility] and Joaq[facility]un Márquez Nurse Coordinator  12/14/2020 1:49 PM

## 2021-01-28 ENCOUNTER — OFFICE VISIT (OUTPATIENT)
Dept: SURGERY | Age: 71
End: 2021-01-28

## 2021-01-28 VITALS
DIASTOLIC BLOOD PRESSURE: 84 MMHG | TEMPERATURE: 97.2 F | SYSTOLIC BLOOD PRESSURE: 142 MMHG | HEIGHT: 63 IN | BODY MASS INDEX: 34.2 KG/M2 | WEIGHT: 193 LBS | OXYGEN SATURATION: 96 % | HEART RATE: 116 BPM

## 2021-01-28 DIAGNOSIS — Z87.19 HISTORY OF SMALL BOWEL OBSTRUCTION: ICD-10-CM

## 2021-01-28 DIAGNOSIS — K56.699 STRICTURE OF SIGMOID COLON (HCC): ICD-10-CM

## 2021-01-28 DIAGNOSIS — Z01.818 PRE-OP TESTING: ICD-10-CM

## 2021-01-28 DIAGNOSIS — Z90.49 HISTORY OF RESECTION OF SMALL BOWEL: ICD-10-CM

## 2021-01-28 DIAGNOSIS — Z12.11 ENCOUNTER FOR SCREENING COLONOSCOPY: Primary | ICD-10-CM

## 2021-01-28 PROCEDURE — 99999 PR OFFICE/OUTPT VISIT,PROCEDURE ONLY: CPT | Performed by: SURGERY

## 2021-01-28 RX ORDER — SODIUM, POTASSIUM,MAG SULFATES 17.5-3.13G
1 SOLUTION, RECONSTITUTED, ORAL ORAL ONCE
Qty: 1 KIT | Refills: 0 | Status: SHIPPED | OUTPATIENT
Start: 2021-01-28 | End: 2021-01-28

## 2021-01-28 ASSESSMENT — ENCOUNTER SYMPTOMS
SHORTNESS OF BREATH: 0
BACK PAIN: 0
BLOOD IN STOOL: 0
VOMITING: 0
ABDOMINAL PAIN: 0
SORE THROAT: 0
COUGH: 0
TROUBLE SWALLOWING: 0
NAUSEA: 0
CHOKING: 0

## 2021-01-28 NOTE — LETTER
[address]  [address] [facility] [address]  Phone: [phone]  Fax: [phone]    Corine Joya MD        January 28, 2021     DANA [facility]  [address] [address]    Patient: Chase Stock  MR Number: [id number]  YOB: 1950  Date of Visit: 1/28/2021    Dear  Hannah Lincoln: Thank you for the request for consultation for Panchito Mckee to me for the evaluation of screening colonoscopy. Below are the relevant portions of my assessment and plan of care. ASSESSMENT     Diagnosis Orders   1. Encounter for screening colonoscopy     2. History of resection of small bowel     3. History of small bowel obstruction     4. Stricture of sigmoid colon ([facility] [address]     5. BMI 34.0-34.9,adult         PLAN    Discussed at length with Ms Colletta Munroe her history of prior incomplete colonoscopies due to redundancy vs sigmoid stricture, bowel resection that was probably small bowel rather than segmental colon resection listed in her medical records. Regardless, she has no symptoms of bowel obstruction. No constipation. Will proceed with colonoscopy. Risks, benefits, alternatives thoroughly reviewed and accepted by Ms Colletta Munroe, including GI bleeding, perforation, missed lesions, COVID-19 exposure/infection, etc.     If you have questions, please do not hesitate to call me. I look forward to following Lisa Steel along with you.     Sincerely,        Corine Joya MD

## 2021-01-28 NOTE — PATIENT INSTRUCTIONS
SURVEY:    You may be receiving a survey from [employer] regarding your visit today. Please complete the survey to enable us to provide the highest quality of care to you and your family. If you cannot score us a very good on any question, please call the office to discuss how we could have made your experience a very good one. Thank you. Patient Education        Learning About Colonoscopy  What is a colonoscopy? A colonoscopy is a test (also called a procedure) that lets a doctor look inside your large intestine. The doctor uses a thin, lighted tube called a colonoscope. The doctor uses it to look for small growths called polyps, colon or rectal cancer (colorectal cancer), or other problems like bleeding. During the procedure, the doctor can take samples of tissue. The samples can then be checked for cancer or other conditions. The doctor can also take out polyps. How is a colonoscopy done? This procedure is done in a doctor's office or a clinic or hospital. You will get medicine to help you relax and not feel pain. Some people find that they don't remember having the test because of the medicine. The doctor gently moves the colonoscope, or scope, through the colon. The scope is also a small video camera. It lets the doctor see the colon and take pictures. How do you prepare for the procedure? You need to clean out your colon before the procedure so the doctor can see all of your colon. This process may start a day or two before the test. This depends on which \"colon prep\" your doctor recommends. To clean your colon, you stop eating solid foods and drink only clear liquids. You can have water, tea, coffee, clear juices, clear broths, flavored ice pops, and gelatin (such as Jell-O). Do not drink anything red or purple. The day or night before the procedure, you drink a large amount of a special liquid. This causes loose, frequent stools. You will go to the bathroom a lot. It's very important to drink all of the liquid. If you have problems drinking it, call your doctor. Some people don't go to work or do their usual activities on the day of the prep. Arrange to have someone take you home after the test.  What can you expect after a colonoscopy? Your doctor will tell you when you can eat and do your usual activities. Drink a lot of fluid after the test to replace the fluids you may have lost during the colon prep. But don't drink alcohol. Your doctor will talk to you about when you'll need your next colonoscopy. The results of your test and your risk for colorectal cancer will help your doctor decide how often you need to be checked. After the test, you may be bloated or have gas pains. You may need to pass gas. If a biopsy was done or a polyp was removed, you may have streaks of blood in your stool (feces) for a few days. If polyps were taken out, your doctor may tell you to avoid taking aspirin and nonsteroidal anti-inflammatory drugs (NSAIDs) for 7 to 14 days. Problems such as heavy rectal bleeding may not occur until several weeks after the test. This isn't common. But it can happen after polyps are removed. Follow-up care is a key part of your treatment and safety. Be sure to make and go to all appointments, and call your doctor if you are having problems. It's also a good idea to know your test results and keep a list of the medicines you take. Where can you learn more? Go to [email or web address] org and sign in to your [employer] account. Enter D366 in the [facility] box to learn more about \"Learning About Colonoscopy. \"     If you do not have an account, please click on the \"Sign Up Now\" link.   Current as of: April 29, 2020               Content Version: 12.6 © 8971-8168 [employer]wise, Incorporated. Care instructions adapted under license by [facility] ([facility]). If you have questions about a medical condition or this instruction, always ask your healthcare professional. [address] any warranty or liability for your use of this information.

## 2021-01-28 NOTE — PROGRESS NOTES
Yan Escoto MD  General Surgery, Endoscopy  Chief Medical Officer    [address] [facility]  [address] [address]  Dept: [phone]  Fax:  Brooke Cleveland  Chief Complaint   Patient presents with    New Patient    Colonoscopy     Patient referred by Neisha Chand NP for screening colonoscopy. Patient's most recent colonoscopy was performed in 2016 by Dr. Brandi Ward. Patient denies experiencing symptoms of pain, rectal bleeding, or constipation. She did mention Hx of Bowel Resection - 10-12 Yrs ago caused from scar tissue with the Hysterectomy. HPI    Ms aDmaris Aponte is a pleasant 71 yo WF kindly referred to me by Neisha Chand for a screening colonoscopy. She has no GI complaints. No abdominal pain. No recent weight change. 193 lbs, BMI 35. No cough, fever, nor other respiratory complaints. BM's daily, formed, brown. Small bowel obstruction requiring bowel resection at [facility] 10-12 years ago. Report unavailable. Colon resection noted in past surgical history, but this is unlikely. Last endoscopy 2016 was limited to flex sig due to redundancy vs sigmoid stricture. No family history of colon cancer. She has never smoked. Review of Systems   Constitutional: Negative for activity change, appetite change, chills, fever and unexpected weight change. HENT: Negative for nosebleeds, sneezing, sore throat and trouble swallowing. Eyes: Negative for visual disturbance. Respiratory: Negative for cough, choking and shortness of breath. Cardiovascular: Negative for chest pain, palpitations and leg swelling. Gastrointestinal: Negative for abdominal pain, blood in stool, nausea and vomiting. Genitourinary: Negative for dysuria, flank pain and hematuria. Musculoskeletal: Positive for arthralgias. Negative for back pain, gait problem and myalgias. Allergic/Immunologic: Negative for immunocompromised state.  Cholecalciferol (VITAMIN D PO) Take by mouth      glucose blood VI test strips (ASCENSIA AUTODISC VI;ONE TOUCH ULTRA TEST VI) strip Test sugar Bid and as needed 100 each 3     No current facility-administered medications for this visit. Social History     Socioeconomic History    Marital status:      Spouse name: None    Number of children: 2    Years of education: 15    Highest education level: None   Occupational History    Occupation:      Comment: cosmotologist , school lunch helper   Social Needs    Financial resource strain: None    Food insecurity     Worry: None     Inability: None    Transportation needs     Medical: None     Non-medical: None   Tobacco Use    Smoking status: Never Smoker    Smokeless tobacco: Never Used    Tobacco comment: hx smoking-6 years 1/2 ppd or less   Substance and Sexual Activity    Alcohol use: No    Drug use: No    Sexual activity: None   Lifestyle    Physical activity     Days per week: None     Minutes per session: None    Stress: None   Relationships    Social connections     Talks on phone: None     Gets together: None     Attends Evangelical service: None     Active member of club or organization: None     Attends meetings of clubs or organizations: None     Relationship status: None    Intimate partner violence     Fear of current or ex partner: None     Emotionally abused: None     Physically abused: None     Forced sexual activity: None   Other Topics Concern    None   Social History Narrative    None       BP (!) 142/84   Pulse 116   Temp 97.2 °F (36.2 °C) (Infrared)   Ht 5' 2.5\" (1.588 m)   Wt 193 lb (87.5 kg)   LMP  (LMP Unknown) Comment: partial  SpO2 96%   Breastfeeding No   BMI 34.74 kg/m²      Physical Exam  Vitals signs and nursing note reviewed. Constitutional:       Appearance: She is well-developed. HENT:      Head: Normocephalic and atraumatic. Eyes:      General: No scleral icterus. Conjunctiva/sclera: Conjunctivae normal.      Pupils: Pupils are equal, round, and reactive to light. Neck:      Musculoskeletal: Normal range of motion and neck supple. Vascular: No JVD. Trachea: No tracheal deviation. Cardiovascular:      Rate and Rhythm: Normal rate and regular rhythm. Pulmonary:      Effort: Pulmonary effort is normal. No respiratory distress. Chest:      Chest wall: No tenderness. Abdominal:      General: There is no distension. Palpations: Abdomen is soft. There is no mass. Tenderness: There is no abdominal tenderness. There is no guarding or rebound. Musculoskeletal: Normal range of motion. Lymphadenopathy:      Cervical: No cervical adenopathy. Skin:     General: Skin is warm and dry. Findings: No erythema or rash. Neurological:      Mental Status: She is alert and oriented to person, place, and time. Cranial Nerves: No cranial nerve deficit. Psychiatric:         Behavior: Behavior normal.         Thought Content: Thought content normal.         Judgment: Judgment normal.         ASSESSMENT     Diagnosis Orders   1. Encounter for screening colonoscopy     2. History of resection of small bowel     3. History of small bowel obstruction     4. Stricture of sigmoid colon ([facility] [address]     5. BMI 34.0-34.9,adult         PLAN    Discussed at length with Ms Eduardo Repress her history of prior incomplete colonoscopies due to redundancy vs sigmoid stricture, bowel resection that was probably small bowel rather than segmental colon resection listed in her medical records. Regardless, she has no symptoms of bowel obstruction. No constipation. Will proceed with colonoscopy.   Risks, benefits, alternatives thoroughly reviewed and accepted by Ms Eduardo Tomer, including GI bleeding, perforation, missed lesions, COVID-19 exposure/infection, etc.      Lucio Rayo MD

## 2021-03-08 ENCOUNTER — OFFICE VISIT (OUTPATIENT)
Dept: FAMILY MEDICINE CLINIC | Age: 71
End: 2021-03-08
Payer: MEDICARE

## 2021-03-08 ENCOUNTER — HOSPITAL ENCOUNTER (OUTPATIENT)
Age: 71
Discharge: HOME | End: 2021-03-08
Payer: MEDICARE

## 2021-03-08 VITALS
OXYGEN SATURATION: 97 % | SYSTOLIC BLOOD PRESSURE: 150 MMHG | DIASTOLIC BLOOD PRESSURE: 80 MMHG | BODY MASS INDEX: 35.28 KG/M2 | HEART RATE: 101 BPM | WEIGHT: 196 LBS

## 2021-03-08 DIAGNOSIS — F41.9 ANXIETY AND DEPRESSION: ICD-10-CM

## 2021-03-08 DIAGNOSIS — F32.A ANXIETY AND DEPRESSION: ICD-10-CM

## 2021-03-08 DIAGNOSIS — Z01.818 PRE-OP TESTING: ICD-10-CM

## 2021-03-08 DIAGNOSIS — E11.9 CONTROLLED TYPE 2 DIABETES MELLITUS WITHOUT COMPLICATION, WITHOUT LONG-TERM CURRENT USE OF INSULIN (HCC): Primary | ICD-10-CM

## 2021-03-08 DIAGNOSIS — Z12.31 BREAST CANCER SCREENING BY MAMMOGRAM: ICD-10-CM

## 2021-03-08 DIAGNOSIS — I10 ESSENTIAL HYPERTENSION, BENIGN: ICD-10-CM

## 2021-03-08 DIAGNOSIS — E78.00 PURE HYPERCHOLESTEROLEMIA: ICD-10-CM

## 2021-03-08 LAB — HBA1C MFR BLD: 7 %

## 2021-03-08 PROCEDURE — 1090F PRES/ABSN URINE INCON ASSESS: CPT | Performed by: NURSE PRACTITIONER

## 2021-03-08 PROCEDURE — 3017F COLORECTAL CA SCREEN DOC REV: CPT | Performed by: NURSE PRACTITIONER

## 2021-03-08 PROCEDURE — G8399 PT W/DXA RESULTS DOCUMENT: HCPCS | Performed by: NURSE PRACTITIONER

## 2021-03-08 PROCEDURE — 83036 HEMOGLOBIN GLYCOSYLATED A1C: CPT | Performed by: NURSE PRACTITIONER

## 2021-03-08 PROCEDURE — G8484 FLU IMMUNIZE NO ADMIN: HCPCS | Performed by: NURSE PRACTITIONER

## 2021-03-08 PROCEDURE — 1123F ACP DISCUSS/DSCN MKR DOCD: CPT | Performed by: NURSE PRACTITIONER

## 2021-03-08 PROCEDURE — 93005 ELECTROCARDIOGRAM TRACING: CPT

## 2021-03-08 PROCEDURE — 3051F HG A1C>EQUAL 7.0%<8.0%: CPT | Performed by: NURSE PRACTITIONER

## 2021-03-08 PROCEDURE — 99214 OFFICE O/P EST MOD 30 MIN: CPT | Performed by: NURSE PRACTITIONER

## 2021-03-08 PROCEDURE — 2022F DILAT RTA XM EVC RTNOPTHY: CPT | Performed by: NURSE PRACTITIONER

## 2021-03-08 PROCEDURE — 4040F PNEUMOC VAC/ADMIN/RCVD: CPT | Performed by: NURSE PRACTITIONER

## 2021-03-08 PROCEDURE — G8417 CALC BMI ABV UP PARAM F/U: HCPCS | Performed by: NURSE PRACTITIONER

## 2021-03-08 PROCEDURE — 1036F TOBACCO NON-USER: CPT | Performed by: NURSE PRACTITIONER

## 2021-03-08 PROCEDURE — G8427 DOCREV CUR MEDS BY ELIG CLIN: HCPCS | Performed by: NURSE PRACTITIONER

## 2021-03-08 RX ORDER — SODIUM, POTASSIUM,MAG SULFATES 17.5-3.13G
SOLUTION, RECONSTITUTED, ORAL ORAL
COMMUNITY
Start: 2021-01-28 | End: 2021-03-29 | Stop reason: ALTCHOICE

## 2021-03-08 RX ORDER — VENLAFAXINE HYDROCHLORIDE 75 MG/1
75 CAPSULE, EXTENDED RELEASE ORAL DAILY
Qty: 90 CAPSULE | Refills: 1 | Status: SHIPPED | OUTPATIENT
Start: 2021-03-08 | End: 2021-04-06 | Stop reason: DRUGHIGH

## 2021-03-08 RX ORDER — ATORVASTATIN CALCIUM 20 MG/1
20 TABLET, FILM COATED ORAL DAILY
Qty: 90 TABLET | Refills: 1 | Status: SHIPPED | OUTPATIENT
Start: 2021-03-08 | End: 2021-11-01 | Stop reason: SDUPTHER

## 2021-03-08 RX ORDER — AMLODIPINE BESYLATE 10 MG/1
10 TABLET ORAL DAILY
Qty: 90 TABLET | Refills: 1 | Status: SHIPPED | OUTPATIENT
Start: 2021-03-08 | End: 2021-05-13 | Stop reason: SDUPTHER

## 2021-03-08 ASSESSMENT — PATIENT HEALTH QUESTIONNAIRE - PHQ9: SUM OF ALL RESPONSES TO PHQ QUESTIONS 1-9: 0

## 2021-03-08 ASSESSMENT — ENCOUNTER SYMPTOMS
SHORTNESS OF BREATH: 0
BLURRED VISION: 0

## 2021-03-08 NOTE — PATIENT INSTRUCTIONS
You may be receiving a survey from [employer] regarding your visit today. You may get this in the mail, through your MyChart or in your email. Please complete the survey to enable us to provide the highest quality of care to you and your family. If you cannot score us as very good ( 5 Stars) on any question, please feel free to call the office to discuss how we could have made your experience exceptional.     Thank You! Gwendalyn Nageotte, APRN-CNP  [address] [facility]  [facility], [address] [facility] [address]    Phone: [phone]  Fax: [phone] Office Hours:  Monday:  office location 8-5 ([phone]) Offering additional late hours the first Monday of the month until 7 pm.   Tuesday: 8-5 Wednesday: 8-5 Thursday:  Additional hours offered 2 Thursdays a month. Please call to inquire those dates.    Fridays: 7:30-4:30
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
## 2021-03-09 LAB
EKG ATRIAL RATE: 100 BPM
EKG P AXIS: 49 DEGREES
EKG P-R INTERVAL: 136 MS
EKG Q-T INTERVAL: 350 MS
EKG QRS DURATION: 94 MS
EKG QTC CALCULATION (BAZETT): 451 MS
EKG R AXIS: -36 DEGREES
EKG T AXIS: 60 DEGREES
EKG VENTRICULAR RATE: 100 BPM

## 2021-03-09 PROCEDURE — 93010 ELECTROCARDIOGRAM REPORT: CPT | Performed by: INTERNAL MEDICINE

## 2021-03-09 ASSESSMENT — ENCOUNTER SYMPTOMS
CONSTIPATION: 0
ABDOMINAL DISTENTION: 0
EYES NEGATIVE: 1
RHINORRHEA: 0

## 2021-03-16 ENCOUNTER — ANESTHESIA EVENT (OUTPATIENT)
Dept: ENDOSCOPY | Age: 71
End: 2021-03-16
Payer: MEDICARE

## 2021-03-22 ENCOUNTER — HOSPITAL ENCOUNTER (OUTPATIENT)
Age: 71
Setting detail: OUTPATIENT SURGERY
Discharge: HOME | End: 2021-03-22
Attending: SURGERY | Admitting: SURGERY
Payer: MEDICARE

## 2021-03-22 ENCOUNTER — ANESTHESIA (OUTPATIENT)
Dept: ENDOSCOPY | Age: 71
End: 2021-03-22
Payer: MEDICARE

## 2021-03-22 ENCOUNTER — HOSPITAL ENCOUNTER (OUTPATIENT)
Dept: PREADMISSION TESTING | Age: 71
Setting detail: SPECIMEN
Discharge: HOME | End: 2021-03-22
Payer: MEDICARE

## 2021-03-22 VITALS
OXYGEN SATURATION: 99 % | RESPIRATION RATE: 10 BRPM | SYSTOLIC BLOOD PRESSURE: 171 MMHG | DIASTOLIC BLOOD PRESSURE: 88 MMHG

## 2021-03-22 VITALS
SYSTOLIC BLOOD PRESSURE: 158 MMHG | BODY MASS INDEX: 34.07 KG/M2 | HEART RATE: 89 BPM | OXYGEN SATURATION: 99 % | HEIGHT: 63 IN | DIASTOLIC BLOOD PRESSURE: 66 MMHG | TEMPERATURE: 98.1 F | WEIGHT: 192.3 LBS | RESPIRATION RATE: 18 BRPM

## 2021-03-22 PROBLEM — Z12.11 ENCOUNTER FOR SCREENING COLONOSCOPY: Status: ACTIVE | Noted: 2021-03-22

## 2021-03-22 LAB
GLUCOSE BLD-MCNC: 183 MG/DL (ref 65–99)
SARS-COV-2, RAPID: NOT DETECTED
SPECIMEN DESCRIPTION: NORMAL

## 2021-03-22 PROCEDURE — 3609010400 HC COLONOSCOPY POLYPECTOMY HOT BIOPSY: Performed by: SURGERY

## 2021-03-22 PROCEDURE — 6360000002 HC RX W HCPCS: Performed by: NURSE ANESTHETIST, CERTIFIED REGISTERED

## 2021-03-22 PROCEDURE — 3700000000 HC ANESTHESIA ATTENDED CARE: Performed by: SURGERY

## 2021-03-22 PROCEDURE — 2500000003 HC RX 250 WO HCPCS: Performed by: NURSE ANESTHETIST, CERTIFIED REGISTERED

## 2021-03-22 PROCEDURE — 2580000003 HC RX 258: Performed by: SURGERY

## 2021-03-22 PROCEDURE — 7100000010 HC PHASE II RECOVERY - FIRST 15 MIN: Performed by: SURGERY

## 2021-03-22 PROCEDURE — 2709999900 HC NON-CHARGEABLE SUPPLY: Performed by: SURGERY

## 2021-03-22 PROCEDURE — U0002 COVID-19 LAB TEST NON-CDC: HCPCS

## 2021-03-22 PROCEDURE — 88305 TISSUE EXAM BY PATHOLOGIST: CPT

## 2021-03-22 PROCEDURE — 3700000001 HC ADD 15 MINUTES (ANESTHESIA): Performed by: SURGERY

## 2021-03-22 PROCEDURE — 82947 ASSAY GLUCOSE BLOOD QUANT: CPT

## 2021-03-22 PROCEDURE — C9803 HOPD COVID-19 SPEC COLLECT: HCPCS

## 2021-03-22 PROCEDURE — 7100000011 HC PHASE II RECOVERY - ADDTL 15 MIN: Performed by: SURGERY

## 2021-03-22 RX ORDER — FENTANYL CITRATE 50 UG/ML
INJECTION, SOLUTION INTRAMUSCULAR; INTRAVENOUS PRN
Status: DISCONTINUED | OUTPATIENT
Start: 2021-03-22 | End: 2021-03-22 | Stop reason: SDUPTHER

## 2021-03-22 RX ORDER — SODIUM CHLORIDE 0.9 % (FLUSH) 0.9 %
10 SYRINGE (ML) INJECTION EVERY 12 HOURS SCHEDULED
Status: CANCELLED | OUTPATIENT
Start: 2021-03-22

## 2021-03-22 RX ORDER — SODIUM CHLORIDE 0.9 % (FLUSH) 0.9 %
10 SYRINGE (ML) INJECTION PRN
Status: DISCONTINUED | OUTPATIENT
Start: 2021-03-22 | End: 2021-03-22 | Stop reason: HOSPADM

## 2021-03-22 RX ORDER — PROPOFOL 10 MG/ML
INJECTION, EMULSION INTRAVENOUS PRN
Status: DISCONTINUED | OUTPATIENT
Start: 2021-03-22 | End: 2021-03-22 | Stop reason: SDUPTHER

## 2021-03-22 RX ORDER — PROPOFOL 10 MG/ML
INJECTION, EMULSION INTRAVENOUS CONTINUOUS PRN
Status: DISCONTINUED | OUTPATIENT
Start: 2021-03-22 | End: 2021-03-22 | Stop reason: SDUPTHER

## 2021-03-22 RX ORDER — SODIUM CHLORIDE, SODIUM LACTATE, POTASSIUM CHLORIDE, CALCIUM CHLORIDE 600; 310; 30; 20 MG/100ML; MG/100ML; MG/100ML; MG/100ML
INJECTION, SOLUTION INTRAVENOUS CONTINUOUS
Status: CANCELLED | OUTPATIENT
Start: 2021-03-22

## 2021-03-22 RX ORDER — SODIUM CHLORIDE, SODIUM LACTATE, POTASSIUM CHLORIDE, CALCIUM CHLORIDE 600; 310; 30; 20 MG/100ML; MG/100ML; MG/100ML; MG/100ML
INJECTION, SOLUTION INTRAVENOUS CONTINUOUS
Status: DISCONTINUED | OUTPATIENT
Start: 2021-03-22 | End: 2021-03-22 | Stop reason: HOSPADM

## 2021-03-22 RX ORDER — MIDAZOLAM HYDROCHLORIDE 2 MG/2ML
INJECTION, SOLUTION INTRAMUSCULAR; INTRAVENOUS PRN
Status: DISCONTINUED | OUTPATIENT
Start: 2021-03-22 | End: 2021-03-22 | Stop reason: SDUPTHER

## 2021-03-22 RX ORDER — LIDOCAINE HYDROCHLORIDE 10 MG/ML
INJECTION, SOLUTION EPIDURAL; INFILTRATION; INTRACAUDAL; PERINEURAL PRN
Status: DISCONTINUED | OUTPATIENT
Start: 2021-03-22 | End: 2021-03-22 | Stop reason: SDUPTHER

## 2021-03-22 RX ORDER — SODIUM CHLORIDE 0.9 % (FLUSH) 0.9 %
10 SYRINGE (ML) INJECTION PRN
Status: CANCELLED | OUTPATIENT
Start: 2021-03-22

## 2021-03-22 RX ORDER — SODIUM CHLORIDE 0.9 % (FLUSH) 0.9 %
10 SYRINGE (ML) INJECTION EVERY 12 HOURS SCHEDULED
Status: DISCONTINUED | OUTPATIENT
Start: 2021-03-22 | End: 2021-03-22 | Stop reason: HOSPADM

## 2021-03-22 RX ADMIN — FENTANYL CITRATE 50 MCG: 50 INJECTION, SOLUTION INTRAMUSCULAR; INTRAVENOUS at 08:10

## 2021-03-22 RX ADMIN — FENTANYL CITRATE 50 MCG: 50 INJECTION, SOLUTION INTRAMUSCULAR; INTRAVENOUS at 08:04

## 2021-03-22 RX ADMIN — PROPOFOL 100 MCG/KG/MIN: 10 INJECTION, EMULSION INTRAVENOUS at 08:10

## 2021-03-22 RX ADMIN — PROPOFOL 50 MG: 10 INJECTION, EMULSION INTRAVENOUS at 08:10

## 2021-03-22 RX ADMIN — MIDAZOLAM HYDROCHLORIDE 2 MG: 2 INJECTION, SOLUTION INTRAMUSCULAR; INTRAVENOUS at 08:04

## 2021-03-22 RX ADMIN — LIDOCAINE HYDROCHLORIDE 100 MG: 10 INJECTION, SOLUTION EPIDURAL; INFILTRATION; INTRACAUDAL; PERINEURAL at 08:10

## 2021-03-22 RX ADMIN — SODIUM CHLORIDE, POTASSIUM CHLORIDE, SODIUM LACTATE AND CALCIUM CHLORIDE: 600; 310; 30; 20 INJECTION, SOLUTION INTRAVENOUS at 08:00

## 2021-03-22 NOTE — H&P
HPI     Ms Phillip Shah is a pleasant 73 yo WF kindly referred to me by Rolanda Stroud for a screening colonoscopy. She has no GI complaints. No abdominal pain. No recent weight change. 193 lbs, BMI 35. No cough, fever, nor other respiratory complaints. BM's daily, formed, brown. Small bowel obstruction requiring bowel resection at [facility] 10-12 years ago. Report unavailable. Colon resection noted in past surgical history, but this is unlikely. Last endoscopy 2016 was limited to flex sig due to redundancy vs sigmoid stricture. No family history of colon cancer. She has never smoked.       Review of Systems   Constitutional: Negative for activity change, appetite change, chills, fever and unexpected weight change. HENT: Negative for nosebleeds, sneezing, sore throat and trouble swallowing. Eyes: Negative for visual disturbance. Respiratory: Negative for cough, choking and shortness of breath. Cardiovascular: Negative for chest pain, palpitations and leg swelling. Gastrointestinal: Negative for abdominal pain, blood in stool, nausea and vomiting. Genitourinary: Negative for dysuria, flank pain and hematuria. Musculoskeletal: Positive for arthralgias. Negative for back pain, gait problem and myalgias. Allergic/Immunologic: Negative for immunocompromised state. Neurological: Negative for dizziness, seizures, syncope, weakness and headaches. Hematological: Does not bruise/bleed easily. Psychiatric/Behavioral: Negative for confusion and sleep disturbance.  The patient is nervous/anxious.          Past Medical History        Past Medical History:   Diagnosis Date    Anxiety      Hyperlipidemia      Hypertension       onset early 85'U    Lichen simplex 0142     shoulders and right leg    Osteopenia 04/2011     femoral necks jean -1.9, -1.8            Past Surgical History         Past Surgical History:   Procedure Laterality Date    CHOLECYSTECTOMY         age 35    COLON SURGERY         colon resection bowel blockage/adhesions-gangrene    COLONOSCOPY   2016     Mikey GORDON    DENTAL SURGERY         x3 pulled    HYSTERECTOMY         kept ovaries            Family History         Family History   Problem Relation Age of Onset    Hypertension Mother      Dementia Mother      Hypertension Father      Heart Disease Father           stroke during heart surgery     High Cholesterol Father      Heart Disease Brother           cath with stents    Heart Attack Maternal Grandmother      Other Paternal Grandmother           cerbral aneurysm    Alcohol Abuse Brother      Cancer Brother           kidney            Allergies:  See list     Current Facility-Administered Medications          Current Outpatient Medications   Medication Sig Dispense Refill    doxyLAMINE succinate (SLEEP-AID) 25 MG tablet Take by mouth nightly        Inulin (FIBER CHOICE PO) Take by mouth        amLODIPine (NORVASC) 5 MG tablet Take 1 tablet by mouth daily hypertension 90 tablet 1    losartan (COZAAR) 100 MG tablet Take 1 tablet by mouth daily For hypertension/kidney protection with diabetes 90 tablet 1    metFORMIN (GLUCOPHAGE) 1000 MG tablet Take 1 tablet by mouth 2 times daily (with meals) 180 tablet 1    atorvastatin (LIPITOR) 20 MG tablet TAKE 1 TABLET BY MOUTH EVERY DAY 90 tablet 1    venlafaxine (EFFEXOR XR) 75 MG extended release capsule TAKE 1 CAPSULE BY MOUTH EVERY DAY 90 capsule 1    aspirin 81 MG tablet Take 81 mg by mouth daily        Cholecalciferol (VITAMIN D PO) Take by mouth        glucose blood VI test strips (ASCENSIA AUTODISC VI;ONE TOUCH ULTRA TEST VI) strip Test sugar Bid and as needed 100 each 3      No current facility-administered medications for this visit.             Social History   Social History            Socioeconomic History    Marital status:         Spouse name: None    Number of children: 2    Years of education: 15    Highest education level: None Occupational History    Occupation:        Comment: cosmotologist , school lunch helper   Social Needs    Financial resource strain: None    Food insecurity       Worry: None       Inability: None    Transportation needs       Medical: None       Non-medical: None   Tobacco Use    Smoking status: Never Smoker    Smokeless tobacco: Never Used    Tobacco comment: hx smoking-6 years 1/2 ppd or less   Substance and Sexual Activity    Alcohol use: No    Drug use: No    Sexual activity: None   Lifestyle    Physical activity       Days per week: None       Minutes per session: None    Stress: None   Relationships    Social connections       Talks on phone: None       Gets together: None       Attends Taoism service: None       Active member of club or organization: None       Attends meetings of clubs or organizations: None       Relationship status: None    Intimate partner violence       Fear of current or ex partner: None       Emotionally abused: None       Physically abused: None       Forced sexual activity: None   Other Topics Concern    None   Social History Narrative    None            BP (!) 142/84   Pulse 116   Temp 97.2 °F (36.2 °C) (Infrared)   Ht 5' 2.5\" (1.588 m)   Wt 193 lb (87.5 kg)   LMP  (LMP Unknown) Comment: partial  SpO2 96%   Breastfeeding No   BMI 34.74 kg/m²       Physical Exam  Vitals signs and nursing note reviewed. Constitutional:       Appearance: She is well-developed. HENT:      Head: Normocephalic and atraumatic. Eyes:      General: No scleral icterus. Conjunctiva/sclera: Conjunctivae normal.      Pupils: Pupils are equal, round, and reactive to light. Neck:      Musculoskeletal: Normal range of motion and neck supple. Vascular: No JVD. Trachea: No tracheal deviation. Cardiovascular:      Rate and Rhythm: Normal rate and regular rhythm. Pulmonary:      Effort: Pulmonary effort is normal. No respiratory distress.    Chest: Chest wall: No tenderness. Abdominal:      General: There is no distension. Palpations: Abdomen is soft. There is no mass. Tenderness: There is no abdominal tenderness. There is no guarding or rebound. Musculoskeletal: Normal range of motion. Lymphadenopathy:      Cervical: No cervical adenopathy. Skin:     General: Skin is warm and dry. Findings: No erythema or rash. Neurological:      Mental Status: She is alert and oriented to person, place, and time. Cranial Nerves: No cranial nerve deficit. Psychiatric:         Behavior: Behavior normal.         Thought Content: Thought content normal.         Judgment: Judgment normal.            ASSESSMENT       Diagnosis Orders   1. Encounter for screening colonoscopy      2. History of resection of small bowel      3. History of small bowel obstruction      4. Stricture of sigmoid colon (HCC)      5. BMI 34.0-34.9,adult            PLAN     Discussed at length with Ms Janel Clark her history of prior incomplete colonoscopies due to redundancy vs sigmoid stricture, bowel resection that was probably small bowel rather than segmental colon resection listed in her medical records. Regardless, she has no symptoms of bowel obstruction. No constipation. Will proceed with colonoscopy.   Risks, benefits, alternatives thoroughly reviewed and accepted by Ms Janel Clark, including GI bleeding, perforation, missed lesions, COVID-19 exposure/infection, etc.      Ailyn Garza MD

## 2021-03-22 NOTE — ANESTHESIA POSTPROCEDURE EVALUATION
Department of Anesthesiology  Postprocedure Note    Patient: Karen Gallagher  MRN: [id number]  YOB: 1950  Date of evaluation: 3/22/2021  Time:  9:49 AM     Procedure Summary     Date: 03/22/21 Room / Location: [facility] ENDOSCOPY    Anesthesia Start: 0804 Anesthesia Stop: 2587    Procedure: COLONOSCOPY POLYPECTOMY HOT BIOPSY (N/A Abdomen) Diagnosis: (SCREENING COLONOSCOPY)    Surgeons: Lucio Raoy MD Responsible Provider: DANA Kaiser CRNA    Anesthesia Type: MAC ASA Status: 2          Anesthesia Type: MAC    Geremias Phase I: Geremias Score: 10    Geremias Phase II: Geremias Score: 9    Last vitals: Reviewed and per EMR flowsheets.        Anesthesia Post Evaluation    Patient location during evaluation: PACU  Patient participation: complete - patient participated  Level of consciousness: awake and alert  Pain score: 0  Airway patency: patent  Nausea & Vomiting: no nausea and no vomiting  Complications: no  Cardiovascular status: blood pressure returned to baseline and hemodynamically stable  Respiratory status: acceptable, room air and spontaneous ventilation  Hydration status: euvolemic

## 2021-03-22 NOTE — OP NOTE
[place] [address]                                OPERATIVE REPORT    PATIENT NAME: Mallorie Scott                       :        1950  MED REC NO:   [id number]                              ROOM:  ACCOUNT NO:   [de-identified]                           ADMIT DATE: 2021  PROVIDER:     Romayne Shawl      DATE OF PROCEDURE:  2021    ATTENDING SURGEON:  Romayne Shawl, MD    PCP:  Dylan Meléndez CNP    PREOPERATIVE DIAGNOSIS:  Screening colonoscopy. POSTOPERATIVE DIAGNOSES:  1. Sessile polyps x2 (proximal ascending colon). 2.  Extensive sigmoid diverticulosis. OPERATION PERFORMED:  1. Colonoscopy, anus to cecum. 2.  Sessile polypectomy x2 (proximal ascending colon). ANESTHESIA:  MAC.    ESTIMATED BLOOD LOSS:  Less than 20 mL. INDICATIONS:  The patient is a 60-year-old white female presenting for  screening colonoscopy. She has no pain nor abdominal complaints. She  had a small bowel resection for adhesions 10 years ago, endoscopy in  2016 limited to flexible sigmoidoscopy due to redundancy versus sigmoid  stricture. Colon resection is noted in the past surgical history,  however, that is not consistent with the patient's reported history nor  on current endoscopy today. This is most likely an error in the  patient's medical record. No family history of colon cancer. She has  never smoked. At this time, screening colonoscopy is indicated. OPERATIVE PROCEDURE:  After obtaining informed consent with discussion  of risks, benefits and alternatives including a risk of GI bleeding,  perforation, missed lesions, COVID-19 exposure/infection, etc., the  patient was taken to the endoscopy suite and placed in the left lateral  recumbent position. Following adequate IV sedation, a digital rectal  exam was performed.   Sphincter tone was normal.  A colonoscope was  passed transanally into the rectum and advanced with gentle insufflation  throughout the entirety of the colon to the cecum. Cecal position was  confirmed by clear visualization of the ileocecal valve, appendiceal  orifice, and transduction of manual pressure in the right lower quadrant  to the cecum. The bowel prep was excellent. All colonic mucosa was  clearly visible. The cecum was normal.  In the proximal ascending  colon, two polyps were removed by hot snare polypectomy, both sessile. The larger of the two was partially pedunculated. Each was recovered. The remaining ascending colon and hepatic flexure were normal.   Transverse colon and splenic flexure were also normal.  The descending  colon and sigmoid were remarkably redundant with extensive diverticular  changes. In the lower sigmoid, approximately 20-30 cm from the anal  verge, an area of tightness was present. No juan a stricture, no  lesions. The mucosa in this location appeared normal.  Upper, middle  and lower portions of the rectum were normal.  On retroflexion, there  were minimal internal hemorrhoids. The colon was decompressed by  suction as the scope was removed. The patient tolerated the procedure  well and was transferred to PACU in stable condition. SPECIMENS:  Sessile ascending polyps x2. DRAINS:  None. COMPLICATIONS:  None. DISPOSITION:  To PACU awake, alert and stable. Following recovery, we  will discharge the patient home with gradual advancement of diet and  activity as tolerated with instructions for a high-fiber, low-fat diet  with fiber supplementation. We will most likely recommend next  screening colonoscopy in 2 years pending final polyp pathology.         Brii Garg    D: 03/22/2021 9:13:55       T: 03/22/2021 9:19:37     [email or web address]  Job#: [id number]     Doc#: [id number]    CC:  Nikky Alvarez

## 2021-03-22 NOTE — PROGRESS NOTES
Drinking water without c/o nausea. Passing flatus. Up to bathroom with steady gait. Voids. Instructions given. Discharge Criteria  Outpatients must meet criteria 1 through 7. Up to restroom, void sufficient amount. Yes    1. Minimum 30 minutes after last dose of sedative medication, minimum 120 minutes after last dose of reversal agent. Yes    2. Systolic BP stable within 20 mmHg for 30 minutes & systolic BP between 90 & 582 or within 10 mmHg of baseline. Yes    3. Pulse between 60 and 100 or within 10 bpm of baseline. Yes    4. Spontaneous respiratory rate >/= 10 per minute. Yes    5. SaO2 >/= 95 or  >/= baseline. Yes    6. Able to cough and swallow or return to baseline function. Yes    7. Alert and oriented or return to baseline mental status. Yes    8. Demonstrates controlled, coordinated movements, ambulates with steady gait, or return to baseline activity function. Yes    9. Minimal or no pain or nausea, or at a level tolerable and acceptable to patient. Yes    10. Takes and retains oral fluids as allowed. Yes    11. Procedural / perioperative site stable. Minimal or no bleeding. Yes        12. If GI endoscopy procedure, minimal or no abdominal distention or passing flatus. Yes    13. Written discharge instructions and emergency telephone number provided. Yes    14. Accompanied by a responsible adult. Yes    Adult patient discharged from facility without responsible person meets above criteria plus the following:   a) remains awake without stimulus for 30 minutes     b) oriented appropriate for age      c) all vital signs stable   d) no significant risk of losing protective reflexes      e) able to maintain pre-procedure mobility without assistance   f) no nausea or dizziness      g) transportation arrangements that do not require patient to operate motor   Vehicle.   Yes

## 2021-03-22 NOTE — ANESTHESIA PRE PROCEDURE
Department of Anesthesiology  Preprocedure Note       Name:  Ronak Menjivar   Age:  72 y.o.  :  1950                                          MRN:  [id number]         Date:  3/22/2021      Surgeon: Amie Cade):  Braxton Mcknight MD    Procedure: Procedure(s):  COLORECTAL CANCER SCREENING, NOT HIGH RISK    Medications prior to admission:   Prior to Admission medications    Medication Sig Start Date End Date Taking?  Authorizing Provider   amLODIPine (NORVASC) 10 MG tablet Take 1 tablet by mouth daily hypertension 3/8/21  Yes DANA Roper CNP   venlafaxine (EFFEXOR XR) 75 MG extended release capsule Take 1 capsule by mouth daily 3/8/21  Yes DANA Roper CNP   atorvastatin (LIPITOR) 20 MG tablet Take 1 tablet by mouth daily 3/8/21  Yes DANA Roper CNP   losartan (COZAAR) 100 MG tablet Take 1 tablet by mouth daily For hypertension/kidney protection with diabetes 20  Yes DANA Roper CNP   SUPREP BOWEL PREP KIT 17.5-3.13-1.6 GM/177ML SOLN USE AS DIRECTED 21   Historical Provider, MD   doxyLAMINE succinate (SLEEP-AID) 25 MG tablet Take by mouth nightly    Historical Provider, MD   Inulin (FIBER CHOICE PO) Take by mouth    Historical Provider, MD   metFORMIN (GLUCOPHAGE) 1000 MG tablet Take 1 tablet by mouth 2 times daily (with meals) 20   DANA Roper CNP   aspirin 81 MG tablet Take 81 mg by mouth daily    Historical Provider, MD   Cholecalciferol (VITAMIN D PO) Take by mouth    Historical Provider, MD   glucose blood VI test strips (ASCENSIA AUTODISC VI;ONE TOUCH ULTRA TEST VI) strip Test sugar Bid and as needed 17   DANA Roper CNP       Current medications:    Current Facility-Administered Medications   Medication Dose Route Frequency Provider Last Rate Last Admin    lactated ringers infusion   Intravenous Continuous Braxton Mcknight MD        sodium chloride flush 0.9 % injection 10 mL  10 mL Intravenous 2 times per day Braxton Mcknight MD 03/20/21    BMI:   Wt Readings from Last 3 Encounters:   03/22/21 192 lb 4.8 oz (87.2 kg)   03/08/21 196 lb (88.9 kg)   01/28/21 193 lb (87.5 kg)     Body mass index is 34.61 kg/m². CBC:   Lab Results   Component Value Date    WBC 11.5 07/09/2020    RBC 5.22 07/09/2020    HGB 13.0 07/09/2020    HCT 39.0 07/09/2020    MCV 74.6 07/09/2020    RDW 15.3 07/09/2020     07/09/2020       CMP:   Lab Results   Component Value Date     07/09/2020    K 4.4 07/09/2020    CL 99 07/09/2020    CO2 23 07/09/2020    BUN 13 07/09/2020    CREATININE 0.76 07/09/2020    GFRAA >60 07/09/2020    LABGLOM >60 07/09/2020    GLUCOSE 175 07/09/2020    GLUCOSE 179 04/12/2012    PROT 8.1 07/09/2020    CALCIUM 10.1 07/09/2020    BILITOT 0.29 07/09/2020    ALKPHOS 130 07/09/2020    AST 12 07/09/2020    ALT 16 07/09/2020       POC Tests: No results for input(s): POCGLU, POCNA, POCK, POCCL, POCBUN, POCHEMO, POCHCT in the last 72 hours.     Coags: No results found for: PROTIME, INR, APTT    HCG (If Applicable): No results found for: PREGTESTUR, PREGSERUM, HCG, HCGQUANT     ABGs: No results found for: PHART, PO2ART, NRP7SXN, ZLZ6XCU, BEART, E3YSGNCL     Type & Screen (If Applicable):  No results found for: LABABO, LABRH    Drug/Infectious Status (If Applicable):  Lab Results   Component Value Date    HEPCAB NONREACTIVE 02/27/2017       COVID-19 Screening (If Applicable):   Lab Results   Component Value Date    COVID19 Not Detected 03/22/2021           Anesthesia Evaluation  Patient summary reviewed and Nursing notes reviewed  Airway: Mallampati: II  TM distance: >3 FB   Neck ROM: full  Mouth opening: > = 3 FB Dental: normal exam         Pulmonary:Negative Pulmonary ROS and normal exam  breath sounds clear to auscultation                             Cardiovascular:  Exercise tolerance: no interval change,   (+) hypertension:, hyperlipidemia      ECG reviewed  Rhythm: regular  Rate: normal                    Neuro/Psych:   (+) depression/anxiety             GI/Hepatic/Renal:   (+) bowel prep,           Endo/Other: Negative Endo/Other ROS   (+) DiabetesType II DM, well controlled, , .                 Abdominal:   (+) obese,     Abdomen: soft. Vascular: negative vascular ROS. Anesthesia Plan      MAC     ASA 2       Induction: intravenous. Anesthetic plan and risks discussed with patient. Plan discussed with attending.                   Dieon Sarmiento, DANA - CRNA   3/22/2021

## 2021-03-23 LAB — SURGICAL PATHOLOGY REPORT: NORMAL

## 2021-03-29 ENCOUNTER — OFFICE VISIT (OUTPATIENT)
Dept: SURGERY | Age: 71
End: 2021-03-29
Payer: MEDICARE

## 2021-03-29 VITALS — TEMPERATURE: 97.8 F | BODY MASS INDEX: 34.73 KG/M2 | HEIGHT: 63 IN | RESPIRATION RATE: 18 BRPM | WEIGHT: 196 LBS

## 2021-03-29 DIAGNOSIS — D12.5 ADENOMATOUS POLYP OF SIGMOID COLON: ICD-10-CM

## 2021-03-29 DIAGNOSIS — Z87.19 HISTORY OF SMALL BOWEL OBSTRUCTION: ICD-10-CM

## 2021-03-29 DIAGNOSIS — Z98.890 S/P COLONOSCOPY WITH POLYPECTOMY: Primary | ICD-10-CM

## 2021-03-29 DIAGNOSIS — K57.30 SIGMOID DIVERTICULOSIS: ICD-10-CM

## 2021-03-29 DIAGNOSIS — Z90.49 HISTORY OF RESECTION OF SMALL BOWEL: ICD-10-CM

## 2021-03-29 DIAGNOSIS — D12.2 ADENOMATOUS POLYP OF ASCENDING COLON: ICD-10-CM

## 2021-03-29 PROCEDURE — 1036F TOBACCO NON-USER: CPT | Performed by: SURGERY

## 2021-03-29 PROCEDURE — G8399 PT W/DXA RESULTS DOCUMENT: HCPCS | Performed by: SURGERY

## 2021-03-29 PROCEDURE — 3017F COLORECTAL CA SCREEN DOC REV: CPT | Performed by: SURGERY

## 2021-03-29 PROCEDURE — 4040F PNEUMOC VAC/ADMIN/RCVD: CPT | Performed by: SURGERY

## 2021-03-29 PROCEDURE — 99212 OFFICE O/P EST SF 10 MIN: CPT | Performed by: SURGERY

## 2021-03-29 PROCEDURE — G8484 FLU IMMUNIZE NO ADMIN: HCPCS | Performed by: SURGERY

## 2021-03-29 PROCEDURE — G8427 DOCREV CUR MEDS BY ELIG CLIN: HCPCS | Performed by: SURGERY

## 2021-03-29 PROCEDURE — G8417 CALC BMI ABV UP PARAM F/U: HCPCS | Performed by: SURGERY

## 2021-03-29 PROCEDURE — 1123F ACP DISCUSS/DSCN MKR DOCD: CPT | Performed by: SURGERY

## 2021-03-29 PROCEDURE — 1090F PRES/ABSN URINE INCON ASSESS: CPT | Performed by: SURGERY

## 2021-03-29 ASSESSMENT — ENCOUNTER SYMPTOMS
SORE THROAT: 0
NAUSEA: 0
TROUBLE SWALLOWING: 0
VOMITING: 0
CHOKING: 0
ABDOMINAL PAIN: 0
SHORTNESS OF BREATH: 0
COUGH: 0
BLOOD IN STOOL: 0
BACK PAIN: 0

## 2021-03-29 NOTE — PROGRESS NOTES
Naima Olguin MD  General Surgery, Endoscopy  Chief Medical Officer    [facility] [address] [address]  [address] [address]  Dept: [phone]  Fax: [phone]    CHIEF COMPLAINT  No chief complaint on file. HPI    Ms Swathi Cesar turns for follow-up after colonoscopy. DATE OF PROCEDURE:  03/22/2021     ATTENDING SURGEON:  Brandi Beal MD     PCP:  Lorena Schwarz CNP     PREOPERATIVE DIAGNOSIS:  Screening colonoscopy.     POSTOPERATIVE DIAGNOSES:  1. Sessile polyps x2 (proximal ascending colon). 2.  Extensive sigmoid diverticulosis.     OPERATION PERFORMED:  1. Colonoscopy, anus to cecum. 2.  Sessile polypectomy x2 (proximal ascending colon)    She is doing well. No complaints. Review of Systems   Constitutional: Negative for activity change, appetite change, chills, fever and unexpected weight change. HENT: Negative for nosebleeds, sneezing, sore throat and trouble swallowing. Eyes: Negative for visual disturbance. Respiratory: Negative for cough, choking and shortness of breath. Cardiovascular: Negative for chest pain, palpitations and leg swelling. Gastrointestinal: Negative for abdominal pain, blood in stool, nausea and vomiting. Genitourinary: Negative for dysuria, flank pain and hematuria. Musculoskeletal: Positive for arthralgias. Negative for back pain, gait problem and myalgias. Allergic/Immunologic: Negative for immunocompromised state. Neurological: Negative for dizziness, seizures, syncope, weakness and headaches. Hematological: Does not bruise/bleed easily. Psychiatric/Behavioral: Negative for confusion and sleep disturbance. The patient is nervous/anxious.         Past Medical History:   Diagnosis Date    Anxiety     Hyperlipidemia     Hypertension     onset early 41'A    Lichen simplex 3460    shoulders and right leg    Osteopenia 04/2011    femoral necks jean -1.9, -1.8       Past Surgical History:   Procedure Laterality Date    CHOLECYSTECTOMY      age 35    COLON SURGERY      colon resection bowel blockage/adhesions-gangrene    COLONOSCOPY  2016    Mikey GORDON    COLONOSCOPY N/A 3/22/2021    COLONOSCOPY POLYPECTOMY HOT BIOPSY performed by Keila Elliott MD at [address]      x3 pulled    HYSTERECTOMY      kept ovaries       Family History   Problem Relation Age of Onset    Hypertension Mother     Dementia Mother     Hypertension Father     Heart Disease Father         stroke during heart surgery     High Cholesterol Father     Heart Disease Brother         cath with stents    Heart Attack Maternal Grandmother     Other Paternal Grandmother         cerbral aneurysm    Alcohol Abuse Brother     Cancer Brother         kidney       Allergies:  See list    Current Outpatient Medications   Medication Sig Dispense Refill    SUPREP BOWEL PREP KIT 17.5-3.13-1.6 GM/177ML SOLN USE AS DIRECTED      amLODIPine (NORVASC) 10 MG tablet Take 1 tablet by mouth daily hypertension 90 tablet 1    venlafaxine (EFFEXOR XR) 75 MG extended release capsule Take 1 capsule by mouth daily 90 capsule 1    atorvastatin (LIPITOR) 20 MG tablet Take 1 tablet by mouth daily 90 tablet 1    doxyLAMINE succinate (SLEEP-AID) 25 MG tablet Take by mouth nightly      Inulin (FIBER CHOICE PO) Take by mouth      losartan (COZAAR) 100 MG tablet Take 1 tablet by mouth daily For hypertension/kidney protection with diabetes 90 tablet 1    metFORMIN (GLUCOPHAGE) 1000 MG tablet Take 1 tablet by mouth 2 times daily (with meals) 180 tablet 1    aspirin 81 MG tablet Take 81 mg by mouth daily      Cholecalciferol (VITAMIN D PO) Take by mouth      glucose blood VI test strips (ASCENSIA AUTODISC VI;ONE TOUCH ULTRA TEST VI) strip Test sugar Bid and as needed 100 each 3     No current facility-administered medications for this visit. Social History     Socioeconomic History    Marital status:       Spouse name: Not on file  Number of children: 2    Years of education: 15    Highest education level: Not on file   Occupational History    Occupation:      Comment: cosmotologist , school lunch helper   Social Needs    Financial resource strain: Not on file    Food insecurity     Worry: Not on file     Inability: Not on file   [employer] needs     Medical: Not on file     Non-medical: Not on file   Tobacco Use    Smoking status: Never Smoker    Smokeless tobacco: Never Used    Tobacco comment: hx smoking-6 years 1/2 ppd or less   Substance and Sexual Activity    Alcohol use: No    Drug use: No    Sexual activity: Not on file   Lifestyle    Physical activity     Days per week: Not on file     Minutes per session: Not on file    Stress: Not on file   Relationships    Social connections     Talks on phone: Not on file     Gets together: Not on file     Attends Mu-ism service: Not on file     Active member of club or organization: Not on file     Attends meetings of clubs or organizations: Not on file     Relationship status: Not on file    Intimate partner violence     Fear of current or ex partner: Not on file     Emotionally abused: Not on file     Physically abused: Not on file     Forced sexual activity: Not on file   Other Topics Concern    Not on file   Social History Narrative    Not on file       LMP  (LMP Unknown) Comment: partial     Physical Exam  Vitals signs and nursing note reviewed. Constitutional:       General: She is not in acute distress. Appearance: She is well-developed. HENT:      Head: Normocephalic and atraumatic. Eyes:      General: No scleral icterus. Conjunctiva/sclera: Conjunctivae normal.      Pupils: Pupils are equal, round, and reactive to light. Neck:      Trachea: No tracheal deviation. Cardiovascular:      Rate and Rhythm: Normal rate. Pulmonary:      Effort: Pulmonary effort is normal. No respiratory distress.    Skin:     General: Skin is warm and dry. Neurological:      Mental Status: She is alert and oriented to person, place, and time. Psychiatric:         Behavior: Behavior normal.         Thought Content: Thought content normal.         Judgment: Judgment normal.         IMAGING/LABS    3/23/2021 10:56 AM - Lamar Montalvo Incoming Lab Results From [facility]    Component Collected Lab   Surgical Pathology Report 03/22/2021  3:23  Slim Pfeiffer   -- Diagnosis --   1.  SIGMOID COLON, BIOPSY:- ADENOMATOUS POLYP (TUBULAR ADENOMA). 2.  PROXIMAL ASCENDING COLON, BIOPSIES:- ADENOMATOUS POLYPS (TUBULAR   ADENOMAS). 3.  PROXIMAL ASCENDING COLON, BIOPSIES:- ADENOMATOUS POLYP (TUBULAR   ADENOMA).        Blaise Aguilar,   **Electronically Signed Out**         sls/3/23/2021         Clinical Information   Pre-op Diagnosis:  SCREENING COLONOSCOPY     Operative Findings:  SIGMOID POLYP; PROXIMAL ASCENDING COLON POLYPS;   PROXIMAL ASCENDING COLON POLYP   Operation Performed:  COLONOSCOPY, POLYPECTOMY HOT BIOPSY     Source of Specimen   1: SIGMOID POLYP   2: PROXIMAL ASCENDING COLON POLYPS   3: PROXIMAL ASCENDING COLON POLYP     Gross Description   1.  \"DARLYN PERCY, SIGMOID POLYP\" One tan-white tissue fragment, 0.3 x   0.3 x 0.3 cm.  Entirely 1cs. 2.  \"DARLYN PERCY, PROXIMAL ASCENDING COLON POLYPS\" Two tan-white   polypoid tissue fragments from 0.5 to 0.7 cm and are 1.2 x 0.6 x 0.4   cm in aggregate.  Entirely 1cs. 3.  \"DARLYN PERCY, PROXIMAL ASCENDING COLON POLYP\" Two tan-white tissue   fragments from 0.3 to 0.6 cm and are 0.6 x 0.4 x 0.1 cm in aggregate. Entirely 1cs.  mpb tm       Microscopic Description   1-3.  Microscopic examination performed. SURGICAL PATHOLOGY CONSULTATION         Patient Name: Caty Vang    Med Rec: [id number]   Path Number: [id number]     [facility]   [facility]   ANATOMIC PATHOLOGY   [address] [place] [address], [place] [address]. [place], [address]   [phone]   Fax: [phone]    Testing

## 2021-03-29 NOTE — LETTER
[address]  [address] [facility] [address]  Phone: [phone]  Fax: [phone]    Janeen Medeiros MD        March 29, 2021     DANA Christianson - CNP  [address] [id number]    Patient: Teresita Gaming  MR Number: [id number]  YOB: 1950  Date of Visit: 3/29/2021    Dear . Andree Doyle: Thank you for the request for consultation for Ned Breath to me for the evaluation of Betty colonoscopy. Below are the relevant portions of my assessment and plan of care. ASSESSMENT     Diagnosis Orders   1. S/P colonoscopy with polypectomy     2. Adenomatous polyp of ascending colon     3. Adenomatous polyp of sigmoid colon     4. Sigmoid diverticulosis     5. History of resection of small bowel     6. History of small bowel obstruction     7. BMI 34.0-34.9,adult         PLAN    Colonoscopy findings and benign pathology reviewed with Ms. Sarah Guerrero. Recommend neck screening colonoscopy in 2 years, age 71. Discussed importance of a healthy, high-fiber low-fat diet, with fiber supplementation, increased water intake, appropriate use of milk of magnesia, physical activity, etc.     If you have questions, please do not hesitate to call me. I look forward to following Fortino Merino along with you.     Sincerely,        Janeen Medeiros MD

## 2021-03-29 NOTE — PATIENT INSTRUCTIONS
Patient Education        Diverticulosis: Care Instructions  Your Care Instructions  In diverticulosis, pouches called diverticula form in the wall of the large intestine (colon). The pouches do not cause any pain or other symptoms. Most people who have diverticulosis do not know they have it. But the pouches sometimes bleed, and if they become infected, they can cause pain and other symptoms. When this happens, it is called diverticulitis. Diverticula form when pressure pushes the wall of the colon outward at certain weak points. A diet that is too low in fiber can cause diverticula. Follow-up care is a key part of your treatment and safety. Be sure to make and go to all appointments, and call your doctor if you are having problems. It's also a good idea to know your test results and keep a list of the medicines you take. How can you care for yourself at home? · Include fruits, leafy green vegetables, beans, and whole grains in your diet each day. These foods are high in fiber. · Take a fiber supplement, such as Citrucel or Metamucil, every day if needed. Read and follow all instructions on the label. · Drink plenty of fluids. If you have kidney, heart, or liver disease and have to limit fluids, talk with your doctor before you increase the amount of fluids you drink. · Get at least 30 minutes of exercise on most days of the week. Walking is a good choice. You also may want to do other activities, such as running, swimming, cycling, or playing tennis or team sports. · Cut out foods that cause gas, pain, or other symptoms. When should you call for help?    Call your doctor now or seek immediate medical care if:    · You have belly pain.     · You pass maroon or very bloody stools.     · You have a fever.     · You have nausea and vomiting.     · You have unusual changes in your bowel movements or abdominal swelling.     · You have burning pain when you urinate.     · You have abnormal vaginal discharge.     ·

## 2021-03-31 ENCOUNTER — TELEPHONE (OUTPATIENT)
Dept: FAMILY MEDICINE CLINIC | Age: 71
End: 2021-03-31

## 2021-03-31 DIAGNOSIS — F32.A ANXIETY AND DEPRESSION: ICD-10-CM

## 2021-03-31 DIAGNOSIS — F41.9 ANXIETY AND DEPRESSION: ICD-10-CM

## 2021-03-31 NOTE — TELEPHONE ENCOUNTER
Needs a call back to clarify dosage of effexor. Health Maintenance   Topic Date Due    DTaP/Tdap/Td vaccine (1 - Tdap) Never done    Shingles Vaccine (2 of 3) 05/26/2014    COVID-19 Vaccine (2 - Pfizer 2-dose series) 03/29/2021    Diabetic retinal exam  06/23/2021    Lipid screen  07/09/2021    Potassium monitoring  07/09/2021    Creatinine monitoring  07/09/2021    Diabetic microalbuminuria test  12/08/2021    Annual Wellness Visit (AWV)  12/08/2021    Breast cancer screen  02/26/2022    Diabetic foot exam  03/08/2022    A1C test (Diabetic or Prediabetic)  03/08/2022    Colon cancer screen colonoscopy  03/22/2031    DEXA (modify frequency per FRAX score)  Completed    Flu vaccine  Completed    Pneumococcal 65+ years Vaccine  Completed    Hepatitis C screen  Completed    Hepatitis A vaccine  Aged Out    Hib vaccine  Aged Out    Meningococcal (ACWY) vaccine  Aged Out             (applicable per patient's age: Cancer Screenings, Depression Screening, Fall Risk Screening, Immunizations)    Hemoglobin A1C (%)   Date Value   03/08/2021 7.0   10/14/2020 6.4   07/09/2020 7.1 (H)     Microalb/Crt.  Ratio (mcg/mg creat)   Date Value   12/08/2020 CANNOT BE CALCULATED     LDL Cholesterol (mg/dL)   Date Value   07/09/2020 60     AST (U/L)   Date Value   07/09/2020 12     ALT (U/L)   Date Value   07/09/2020 16     BUN (mg/dL)   Date Value   07/09/2020 13      (goal A1C is < 7)   (goal LDL is <100) need 30-50% reduction from baseline     BP Readings from Last 3 Encounters:   03/22/21 (!) 171/88   03/22/21 (!) 158/66   03/08/21 (!) 150/80    (goal /80)      All Future Testing planned in [facility]:  Lab Frequency Next Occurrence   ABELARDO BEE DIGITAL SCREEN BILATERAL Once 09/08/2021   Initiate PAT Protocol Once 04/01/2021       Next Visit Date:  Future Appointments   Date Time Provider Soha Rodriguez   6/7/2021  1:30 PM Ky Gamboa APRN - CNP [address] [facility]            Patient Active Problem List:

## 2021-04-01 RX ORDER — VENLAFAXINE HYDROCHLORIDE 37.5 MG/1
37.5 CAPSULE, EXTENDED RELEASE ORAL DAILY
Qty: 30 CAPSULE | Refills: 0 | OUTPATIENT
Start: 2021-04-01

## 2021-04-06 RX ORDER — VENLAFAXINE HYDROCHLORIDE 75 MG/1
75 CAPSULE, EXTENDED RELEASE ORAL DAILY
Qty: 90 CAPSULE | Refills: 1 | Status: SHIPPED
Start: 2021-04-06 | End: 2021-11-01 | Stop reason: SDUPTHER

## 2021-04-06 RX ORDER — VENLAFAXINE HYDROCHLORIDE 37.5 MG/1
37.5 CAPSULE, EXTENDED RELEASE ORAL DAILY
Qty: 30 CAPSULE | Refills: 2 | Status: SHIPPED | OUTPATIENT
Start: 2021-04-06 | End: 2021-08-02 | Stop reason: ALTCHOICE

## 2021-04-06 RX ORDER — VENLAFAXINE HYDROCHLORIDE 37.5 MG/1
37.5 CAPSULE, EXTENDED RELEASE ORAL DAILY
Qty: 30 CAPSULE | Refills: 0 | Status: CANCELLED | OUTPATIENT
Start: 2021-04-06

## 2021-04-06 NOTE — TELEPHONE ENCOUNTER
Pt needs refill effexor I believe she is taking 75 mg and 37.5 mg dosing for total daily dose of 112.5 mg daily dose . Check if she needs refill of smaller 37.5 mg pill ? [facility]      thanks

## 2021-04-06 NOTE — TELEPHONE ENCOUNTER
Pt is taking 37.5 mg and 75 mg both for total daily dose of 112 mg daily. [facility]

## 2021-04-21 PROBLEM — Z12.11 ENCOUNTER FOR SCREENING COLONOSCOPY: Status: RESOLVED | Noted: 2021-03-22 | Resolved: 2021-04-21

## 2021-05-13 RX ORDER — AMLODIPINE BESYLATE 10 MG/1
10 TABLET ORAL DAILY
Qty: 90 TABLET | Refills: 1 | Status: SHIPPED | OUTPATIENT
Start: 2021-05-13 | End: 2021-07-04 | Stop reason: SDUPTHER

## 2021-06-14 DIAGNOSIS — I10 ESSENTIAL HYPERTENSION, BENIGN: ICD-10-CM

## 2021-06-14 RX ORDER — LOSARTAN POTASSIUM 100 MG/1
100 TABLET ORAL DAILY
Qty: 30 TABLET | Refills: 1 | Status: SHIPPED | OUTPATIENT
Start: 2021-06-14 | End: 2021-08-02 | Stop reason: SDUPTHER

## 2021-06-14 NOTE — TELEPHONE ENCOUNTER
Patient asking for a new script for Losartan - patient uses [facility] Maintenance   Topic Date Due    DTaP/Tdap/Td vaccine (1 - Tdap) Never done    Shingles Vaccine (2 of 3) 05/26/2014    COVID-19 Vaccine (2 - Pfizer 2-dose series) 03/29/2021    Diabetic retinal exam  06/23/2021    Lipid screen  07/09/2021    Potassium monitoring  07/09/2021    Creatinine monitoring  07/09/2021    Diabetic microalbuminuria test  12/08/2021    Annual Wellness Visit (AWV)  12/08/2021    Breast cancer screen  02/26/2022    Diabetic foot exam  03/08/2022    A1C test (Diabetic or Prediabetic)  03/08/2022    Colon cancer screen colonoscopy  03/22/2023    DEXA (modify frequency per FRAX score)  Completed    Flu vaccine  Completed    Pneumococcal 65+ years Vaccine  Completed    Hepatitis C screen  Completed    Hepatitis A vaccine  Aged Out    Hib vaccine  Aged Out    Meningococcal (ACWY) vaccine  Aged Out             (applicable per patient's age: Cancer Screenings, Depression Screening, Fall Risk Screening, Immunizations)    Hemoglobin A1C (%)   Date Value   03/08/2021 7.0   10/14/2020 6.4   07/09/2020 7.1 (H)     Microalb/Crt. Ratio (mcg/mg creat)   Date Value   12/08/2020 CANNOT BE CALCULATED     LDL Cholesterol (mg/dL)   Date Value   07/09/2020 60     AST (U/L)   Date Value   07/09/2020 12     ALT (U/L)   Date Value   07/09/2020 16     BUN (mg/dL)   Date Value   07/09/2020 13      (goal A1C is < 7)   (goal LDL is <100) need 30-50% reduction from baseline     BP Readings from Last 3 Encounters:   03/22/21 (!) 171/88   03/22/21 (!) 158/66   03/08/21 (!) 150/80    (goal /80)      All Future Testing planned in [facility]:  Lab Frequency Next Occurrence   ABELARDO BEE DIGITAL SCREEN BILATERAL Once 09/08/2021       Next Visit Date:  No future appointments.          Patient Active Problem List:     Type 2 diabetes, HbA1c goal < 7% (HCC)     Pure hypercholesterolemia     Essential hypertension, benign     Senile osteoporosis     Anxiety state     Dermatophytosis of foot

## 2021-06-14 NOTE — TELEPHONE ENCOUNTER
Last OV: 3/8/2021 dm, htn  Last RX:   Next scheduled apt:    Pt was due for 3 month follow up 06/08/2021     lvm to return call to schedule next appt     RX pending 1

## 2021-07-04 RX ORDER — AMLODIPINE BESYLATE 10 MG/1
10 TABLET ORAL DAILY
Qty: 90 TABLET | Refills: 1 | Status: SHIPPED | OUTPATIENT
Start: 2021-07-04 | End: 2021-12-30 | Stop reason: SDUPTHER

## 2021-07-12 ENCOUNTER — TELEPHONE (OUTPATIENT)
Dept: FAMILY MEDICINE CLINIC | Age: 71
End: 2021-07-12

## 2021-07-12 DIAGNOSIS — E55.9 VITAMIN D DEFICIENCY: ICD-10-CM

## 2021-07-12 DIAGNOSIS — F41.9 ANXIETY AND DEPRESSION: ICD-10-CM

## 2021-07-12 DIAGNOSIS — Z13.29 SCREENING FOR THYROID DISORDER: ICD-10-CM

## 2021-07-12 DIAGNOSIS — I10 ESSENTIAL HYPERTENSION, BENIGN: Primary | ICD-10-CM

## 2021-07-12 DIAGNOSIS — E78.00 PURE HYPERCHOLESTEROLEMIA: ICD-10-CM

## 2021-07-12 DIAGNOSIS — E11.9 CONTROLLED TYPE 2 DIABETES MELLITUS WITHOUT COMPLICATION, WITHOUT LONG-TERM CURRENT USE OF INSULIN (HCC): ICD-10-CM

## 2021-07-12 DIAGNOSIS — F32.A ANXIETY AND DEPRESSION: ICD-10-CM

## 2021-07-12 NOTE — TELEPHONE ENCOUNTER
----- Message from Baljit Dela Cruz sent at 7/10/2021  9:04 AM EDT -----  Subject: Refill Request    QUESTIONS  Name of Medication? metFORMIN (GLUCOPHAGE) 1000 MG tablet  Patient-reported dosage and instructions? Take 1 tablet by mouth 2 times   daily (with meals)  How many days do you have left? 1  Preferred Pharmacy? [id number]  Pharmacy phone number (if available)? [phone]  Additional Information for Provider? Pt is requesting a 80 supply  ---------------------------------------------------------------------------  --------------  CALL BACK INFO  What is the best way for the office to contact you?  OK to leave message on   voicemail  Preferred Call Back Phone Number? [phone]

## 2021-07-12 NOTE — TELEPHONE ENCOUNTER
Medication pending      Last OV: 3/8/2021  Last RX: 12/07/20   Next scheduled apt: 8/2/2021    Pt also requesting labs to be ordered

## 2021-07-12 NOTE — TELEPHONE ENCOUNTER
----- Message from Marino Simeon sent at 7/10/2021  9:02 AM EDT -----  Subject: Referral Request    QUESTIONS   Reason for referral request? Pt is requesting lab orders before appt on   7/12 Pt went to lab but was told that no orders were in the computer   Has the physician seen you for this condition before? No   Preferred Specialist (if applicable)? Do you already have an appointment scheduled? Yes  Select Scheduled Date? 2021-07-12  Select Scheduled Physician? Additional Information for Provider? Pt is requesting lab orders before   appt on 7/12 Pt went to lab but was told that no orders were in the   computer  ---------------------------------------------------------------------------  --------------  [address]  What is the best way for the office to contact you?  OK to leave message on   voicemail  Preferred Call Back Phone Number? [phone]

## 2021-07-30 ENCOUNTER — HOSPITAL ENCOUNTER (OUTPATIENT)
Age: 71
Discharge: HOME | End: 2021-07-30
Payer: MEDICARE

## 2021-07-30 DIAGNOSIS — E78.00 PURE HYPERCHOLESTEROLEMIA: ICD-10-CM

## 2021-07-30 DIAGNOSIS — Z13.29 SCREENING FOR THYROID DISORDER: ICD-10-CM

## 2021-07-30 DIAGNOSIS — I10 ESSENTIAL HYPERTENSION, BENIGN: ICD-10-CM

## 2021-07-30 DIAGNOSIS — E55.9 VITAMIN D DEFICIENCY: ICD-10-CM

## 2021-07-30 DIAGNOSIS — E11.9 CONTROLLED TYPE 2 DIABETES MELLITUS WITHOUT COMPLICATION, WITHOUT LONG-TERM CURRENT USE OF INSULIN (HCC): ICD-10-CM

## 2021-07-30 LAB
ABSOLUTE EOS #: 0.4 K/UL (ref 0–0.4)
ABSOLUTE IMMATURE GRANULOCYTE: ABNORMAL K/UL (ref 0–0.3)
ABSOLUTE LYMPH #: 3.6 K/UL (ref 1–4.8)
ABSOLUTE MONO #: 0.8 K/UL (ref 0–1)
ALBUMIN SERPL-MCNC: 4 G/DL (ref 3.5–5.2)
ALBUMIN/GLOBULIN RATIO: ABNORMAL (ref 1–2.5)
ALP BLD-CCNC: 127 U/L (ref 35–104)
ALT SERPL-CCNC: 17 U/L (ref 5–33)
ANION GAP SERPL CALCULATED.3IONS-SCNC: 15 MMOL/L (ref 9–17)
AST SERPL-CCNC: 14 U/L
BASOPHILS # BLD: 0 % (ref 0–2)
BASOPHILS ABSOLUTE: 0 K/UL (ref 0–0.2)
BILIRUB SERPL-MCNC: 0.3 MG/DL (ref 0.3–1.2)
BUN BLDV-MCNC: 14 MG/DL (ref 8–23)
BUN/CREAT BLD: 20 (ref 9–20)
CALCIUM SERPL-MCNC: 9.7 MG/DL (ref 8.6–10.4)
CHLORIDE BLD-SCNC: 102 MMOL/L (ref 98–107)
CHOLESTEROL/HDL RATIO: 4.4
CHOLESTEROL: 164 MG/DL
CO2: 23 MMOL/L (ref 20–31)
CREAT SERPL-MCNC: 0.69 MG/DL (ref 0.5–0.9)
DIFFERENTIAL TYPE: YES
EOSINOPHILS RELATIVE PERCENT: 3 % (ref 0–5)
ESTIMATED AVERAGE GLUCOSE: 183 MG/DL
GFR AFRICAN AMERICAN: >60 ML/MIN
GFR NON-AFRICAN AMERICAN: >60 ML/MIN
GFR SERPL CREATININE-BSD FRML MDRD: ABNORMAL ML/MIN/{1.73_M2}
GFR SERPL CREATININE-BSD FRML MDRD: ABNORMAL ML/MIN/{1.73_M2}
GLUCOSE BLD-MCNC: 182 MG/DL (ref 70–99)
HBA1C MFR BLD: 8 % (ref 4–6)
HCT VFR BLD CALC: 38.9 % (ref 36–46)
HDLC SERPL-MCNC: 37 MG/DL
HEMOGLOBIN: 12.9 G/DL (ref 12–16)
IMMATURE GRANULOCYTES: ABNORMAL %
LDL CHOLESTEROL: 84 MG/DL (ref 0–130)
LYMPHOCYTES # BLD: 26 % (ref 15–40)
MCH RBC QN AUTO: 24.6 PG (ref 26–34)
MCHC RBC AUTO-ENTMCNC: 33.1 G/DL (ref 31–37)
MCV RBC AUTO: 74.4 FL (ref 80–100)
MONOCYTES # BLD: 6 % (ref 4–8)
NRBC AUTOMATED: ABNORMAL PER 100 WBC
PATIENT FASTING?: YES
PDW BLD-RTO: 15.6 % (ref 12.1–15.2)
PLATELET # BLD: 457 K/UL (ref 140–450)
PLATELET ESTIMATE: ABNORMAL
PMV BLD AUTO: ABNORMAL FL (ref 6–12)
POTASSIUM SERPL-SCNC: 4.1 MMOL/L (ref 3.7–5.3)
RBC # BLD: 5.23 M/UL (ref 4–5.2)
RBC # BLD: ABNORMAL 10*6/UL
SEG NEUTROPHILS: 65 % (ref 47–75)
SEGMENTED NEUTROPHILS ABSOLUTE COUNT: 9.2 K/UL (ref 2.5–7)
SODIUM BLD-SCNC: 140 MMOL/L (ref 135–144)
TOTAL PROTEIN: 7.6 G/DL (ref 6.4–8.3)
TRIGL SERPL-MCNC: 214 MG/DL
TSH SERPL DL<=0.05 MIU/L-ACNC: 3.33 MIU/L (ref 0.3–5)
VITAMIN D 25-HYDROXY: 19.7 NG/ML (ref 30–100)
VLDLC SERPL CALC-MCNC: ABNORMAL MG/DL (ref 1–30)
WBC # BLD: 14.1 K/UL (ref 3.5–11)
WBC # BLD: ABNORMAL 10*3/UL

## 2021-07-30 PROCEDURE — 84443 ASSAY THYROID STIM HORMONE: CPT

## 2021-07-30 PROCEDURE — 80061 LIPID PANEL: CPT

## 2021-07-30 PROCEDURE — 83036 HEMOGLOBIN GLYCOSYLATED A1C: CPT

## 2021-07-30 PROCEDURE — 80053 COMPREHEN METABOLIC PANEL: CPT

## 2021-07-30 PROCEDURE — 82306 VITAMIN D 25 HYDROXY: CPT

## 2021-07-30 PROCEDURE — 36415 COLL VENOUS BLD VENIPUNCTURE: CPT

## 2021-07-30 PROCEDURE — 85025 COMPLETE CBC W/AUTO DIFF WBC: CPT

## 2021-08-02 ENCOUNTER — OFFICE VISIT (OUTPATIENT)
Dept: FAMILY MEDICINE CLINIC | Age: 71
End: 2021-08-02
Payer: MEDICARE

## 2021-08-02 VITALS
WEIGHT: 200 LBS | SYSTOLIC BLOOD PRESSURE: 138 MMHG | OXYGEN SATURATION: 98 % | DIASTOLIC BLOOD PRESSURE: 82 MMHG | HEART RATE: 100 BPM | BODY MASS INDEX: 36 KG/M2

## 2021-08-02 DIAGNOSIS — F41.9 ANXIETY AND DEPRESSION: ICD-10-CM

## 2021-08-02 DIAGNOSIS — I10 ESSENTIAL HYPERTENSION, BENIGN: ICD-10-CM

## 2021-08-02 DIAGNOSIS — E08.8 DIABETES MELLITUS DUE TO UNDERLYING CONDITION WITH COMPLICATION, WITHOUT LONG-TERM CURRENT USE OF INSULIN (HCC): ICD-10-CM

## 2021-08-02 DIAGNOSIS — Z78.0 POSTMENOPAUSAL ESTROGEN DEFICIENCY: ICD-10-CM

## 2021-08-02 DIAGNOSIS — E11.9 CONTROLLED TYPE 2 DIABETES MELLITUS WITHOUT COMPLICATION, WITHOUT LONG-TERM CURRENT USE OF INSULIN (HCC): Primary | ICD-10-CM

## 2021-08-02 DIAGNOSIS — F32.A ANXIETY AND DEPRESSION: ICD-10-CM

## 2021-08-02 DIAGNOSIS — M85.851 OSTEOPENIA OF BOTH HIPS: ICD-10-CM

## 2021-08-02 DIAGNOSIS — E66.1 CLASS 2 DRUG-INDUCED OBESITY WITHOUT SERIOUS COMORBIDITY WITH BODY MASS INDEX (BMI) OF 36.0 TO 36.9 IN ADULT: ICD-10-CM

## 2021-08-02 DIAGNOSIS — Z13.820 SCREENING FOR OSTEOPOROSIS: ICD-10-CM

## 2021-08-02 DIAGNOSIS — E78.00 PURE HYPERCHOLESTEROLEMIA: ICD-10-CM

## 2021-08-02 DIAGNOSIS — M85.852 OSTEOPENIA OF BOTH HIPS: ICD-10-CM

## 2021-08-02 DIAGNOSIS — E66.01 SEVERE OBESITY (BMI 35.0-35.9 WITH COMORBIDITY) (HCC): ICD-10-CM

## 2021-08-02 PROCEDURE — 2022F DILAT RTA XM EVC RTNOPTHY: CPT | Performed by: NURSE PRACTITIONER

## 2021-08-02 PROCEDURE — 1036F TOBACCO NON-USER: CPT | Performed by: NURSE PRACTITIONER

## 2021-08-02 PROCEDURE — 3017F COLORECTAL CA SCREEN DOC REV: CPT | Performed by: NURSE PRACTITIONER

## 2021-08-02 PROCEDURE — 1123F ACP DISCUSS/DSCN MKR DOCD: CPT | Performed by: NURSE PRACTITIONER

## 2021-08-02 PROCEDURE — G8417 CALC BMI ABV UP PARAM F/U: HCPCS | Performed by: NURSE PRACTITIONER

## 2021-08-02 PROCEDURE — G8427 DOCREV CUR MEDS BY ELIG CLIN: HCPCS | Performed by: NURSE PRACTITIONER

## 2021-08-02 PROCEDURE — 4040F PNEUMOC VAC/ADMIN/RCVD: CPT | Performed by: NURSE PRACTITIONER

## 2021-08-02 PROCEDURE — G8399 PT W/DXA RESULTS DOCUMENT: HCPCS | Performed by: NURSE PRACTITIONER

## 2021-08-02 PROCEDURE — 99214 OFFICE O/P EST MOD 30 MIN: CPT | Performed by: NURSE PRACTITIONER

## 2021-08-02 PROCEDURE — 3052F HG A1C>EQUAL 8.0%<EQUAL 9.0%: CPT | Performed by: NURSE PRACTITIONER

## 2021-08-02 PROCEDURE — 1090F PRES/ABSN URINE INCON ASSESS: CPT | Performed by: NURSE PRACTITIONER

## 2021-08-02 RX ORDER — LOSARTAN POTASSIUM 100 MG/1
100 TABLET ORAL DAILY
Qty: 90 TABLET | Refills: 1 | Status: SHIPPED | OUTPATIENT
Start: 2021-08-02 | End: 2022-02-07 | Stop reason: SDUPTHER

## 2021-08-02 SDOH — ECONOMIC STABILITY: FOOD INSECURITY: WITHIN THE PAST 12 MONTHS, THE FOOD YOU BOUGHT JUST DIDN'T LAST AND YOU DIDN'T HAVE MONEY TO GET MORE.: NEVER TRUE

## 2021-08-02 SDOH — ECONOMIC STABILITY: FOOD INSECURITY: WITHIN THE PAST 12 MONTHS, YOU WORRIED THAT YOUR FOOD WOULD RUN OUT BEFORE YOU GOT MONEY TO BUY MORE.: NEVER TRUE

## 2021-08-02 ASSESSMENT — SOCIAL DETERMINANTS OF HEALTH (SDOH): HOW HARD IS IT FOR YOU TO PAY FOR THE VERY BASICS LIKE FOOD, HOUSING, MEDICAL CARE, AND HEATING?: NOT HARD AT ALL

## 2021-08-02 NOTE — PATIENT INSTRUCTIONS
You may be receiving a survey from [employer] regarding your visit today. You may get this in the mail, through your MyChart or in your email. Please complete the survey to enable us to provide the highest quality of care to you and your family. If you cannot score us as very good ( 5 Stars) on any question, please feel free to call the office to discuss how we could have made your experience exceptional.     Thank You! Tori Duong, DANA-CNP  [address]  [place], [place]    Phone: [phone]  Fax: [address] Office Hours:  Monday: [facility] office location 8-5 ([phone]) Offering additional late hours the first Monday of the month until 7 pm.   Tuesday: 8-5 Wednesday: 8-5 Thursday:  Additional hours offered 2 Thursdays a month. Please call to inquire those dates. Fridays: 7:30-4:30        Tetanus diptheria and pertussis (Tdap) vaccine at local pharmacy. Current calcium 600mg  With vitamin D3 1000 iu change to taking 1 pill 3 days a week only. Vitamin D3 take 2000 iu separately daily. Patient Education        Preventing Osteoporosis: Care Instructions  Your Care Instructions     Osteoporosis means the bones are weak and thin enough that they can break easily. The older you are, the more likely you are to get osteoporosis. But with plenty of calcium, vitamin D, and exercise, you can help prevent osteoporosis. The preteen and teen years are a key time for bone building. With the help of calcium, vitamin D, and exercise in those early years and beyond, the bones reach their peak density and strength by age 27. After age 27, your bones naturally start to thin and weaken. The stronger your bones are at around age 27, the lower your risk for osteoporosis. But no matter what your age and risk are, your bones still need calcium, vitamin D, and exercise to stay strong. Also avoid smoking, and limit alcohol.  Smoking and heavy alcohol use can make your bones thinner. Talk to your doctor about any special risks you might have, such as having a close relative with osteoporosis or taking a medicine that can weaken bones. Your doctor can tell you the best ways to protect your bones from thinning. Follow-up care is a key part of your treatment and safety. Be sure to make and go to all appointments, and call your doctor if you are having problems. It's also a good idea to know your test results and keep a list of the medicines you take. How can you care for yourself at home? Get enough calcium and vitamin D.  · Eat foods rich in calcium, like yogurt, cheese, milk, and dark green vegetables. · Eat foods rich in vitamin D, like eggs, fatty fish, cereal, and fortified milk. · Get some sunshine. Your body uses sunshine to make its own vitamin D.  · Talk to your doctor about taking a calcium plus vitamin D supplement if you don't think you are getting enough through your diet and sunshine. · Get regular bone-building exercise. Walking, jogging, dancing, and lifting weights can make your bones stronger. · Limit alcohol. Drink no more than 1 alcohol drink a day if you are a woman. Drink no more than 2 alcohol drinks a day if you are a man. · Do not smoke. Smoking can make bones thin faster. If you need help quitting, talk to your doctor about stop-smoking programs and medicines. These can increase your chances of quitting for good. When should you call for help? Watch closely for changes in your health, and be sure to contact your doctor if you have any problems. Where can you learn more? Go to https://jordin.ShareMeme. org and sign in to your [employer] account. Enter Y712 in the [facility] box to learn more about \"Preventing Osteoporosis: Care Instructions. \"     If you do not have an account, please click on the \"Sign Up Now\" link. Current as of: December 7, 2020               Content Version: 12.9  © 0720-7083 Healthwise, Incorporated. Care instructions adapted under license by [facility] ([facility]). If you have questions about a medical condition or this instruction, always ask your healthcare professional. [address] any warranty or liability for your use of this information. Patient Education        Deciding About Bisphosphonate Medicine for Osteoporosis  How can you decide about taking bisphosphonate medicine for osteoporosis? This information is right for you if you are a woman who has been through menopause. If that does not describe you, or if you're not sure, talk with your doctor about this decision. What is osteoporosis? Osteoporosis is a disease that affects your bones. It means you have bones that are thin and brittle and have lots of holes inside them like a sponge. This makes them easy to break. It can lead to broken bones (fractures), especially in the hip, spine, and wrist. These fractures may make it hard for you to live on your own. Bisphosphonates are the most common medicines used to prevent bone loss. They may be taken as a pill or an injection into a vein. Bisphosphonates slow the way bone dissolves and is absorbed by your body. They can increase bone thickness and strength. What are key points about this decision? · If you are at a higher risk of having a fracture, taking bisphosphonates is more likely to help you prevent a fracture. If your risk of a fracture is lower, it's less likely that these medicines will help you. · Bisphosphonates can cause problems with the jaw or thigh bone. But most women do not have these side effects. · Whether you take medicine or not, healthy habits can help protect your bones. Get enough calcium and vitamin D. Get regular weight-bearing exercise. Cut back on alcohol. And if you smoke, quit. Who is helped the most by bisphosphonates?   For women who have been through menopause:  · If you have osteoporosis (your T-score is -2.5 or less) or you have had a fracture, taking bisphosphonates lowers your risk of having a fracture. · If you haven't had a fracture and you have low bone density (your T-score is between -1.0 and -2.5, sometimes called osteopenia), taking bisphosphonates might lower your risk of having a fracture. This evidence is not as strong. What are the side effects of bisphosphonates? These medicines can have side effects, such as heartburn and belly pain. Certain bone problems have also been reported in women taking bisphosphonates. Out of 1,000 people, about 1 person has a bone side effect during a year of taking bisphosphonates. That means 999 out of 1,000 people do not have a bone side effect. These bone side effects include problems with the jaw bone (called osteonecrosis). They also might include a certain kind of fracture of the thigh bone (called an atypical fracture), but more research is needed to find out if taking bisphosphonates is a cause of these fractures. Your decision  Thinking about the facts and your feelings can help you make a decision that is right for you. Be sure you understand the benefits and risks of your options, and think about what else you need to do before you make the decision. Where can you learn more? Go to https://InvolverpepicVivotech.Vital Energi. org and sign in to your [employer] account. Enter J011 in the [employer] box to learn more about \"Deciding About Bisphosphonate Medicine for Osteoporosis. \"     If you do not have an account, please click on the \"Sign Up Now\" link. Current as of: December 7, 2020               Content Version: 12.9  © 2006-2021 Healthwise, Incorporated. Care instructions adapted under license by [facility] [address] If you have questions about a medical condition or this instruction, always ask your healthcare professional. [place] [address] any warranty or liability for your use of this information.

## 2021-08-02 NOTE — PROGRESS NOTES
[facility]  [facility] [address] [address]  Dept: [phone]  Dept Fax: [phone]    Last encounter 3/8/2021    Diabetes and Health Maintenance (last eye exam done with Dr. Karolina Mike in [place].)       HPI:   Tara Lauren is a 80 y.o. female who presentstoday for her medical conditions/complaints as noted below. Tara Lauren is c/o of Diabetes and Health Maintenance (last eye exam done with Dr. Karolina Mike in [place].)      HPI  Established patient    80year old female here today for chronic check ups   Type 2 DM challenged by binge eating with emotional stress of being  x 5 years and trying to date again. \"they drive me crazy\"   Has 2 gentlemen she likes to see one only 2 x yearly very kind but would like to break it off, but likes the company. The other from [place] and asks for money often (she does not give him any but he keeps asking)     Anxiety -pt remains on effexor 75 mg which she feels does well. misses her old home swapped homes with son due to scared being alone [place]. Patient also has small dog at home which has multiple medical problems that restricts her from getting out of the house and she cares for this dog daily and has many tasks associated with the dog that she has to do throughout the day  Treatment Adherence:   Medication compliance:  compliant all of the time  Diet compliance:  challenged by binge eating, but overall low calorie diet. Weight trend: fluctuating  Current exercise: no regular exercise  Barriers: stress and time constraints    Wt Readings from Last 3 Encounters:   08/02/21 200 lb (90.7 kg)   03/29/21 196 lb (88.9 kg)   03/22/21 192 lb 4.8 oz (87.2 kg)       Diabetes Mellitus Type 2: Current symptoms/problems include none.   Poor control recently  Lab Results   Component Value Date    LABA1C 8.0 (H) 07/30/2021     Lab Results   Component Value Date     07/30/2021     Will need to ramp up medication but pt with very poor dietary intake states \"don't eat often by myself\" sandwich and bites described as eating or can eat a \"whole box drumsticks\" if emotionally stressed. Will try to add farxiga as option usually covered with medicare but cannot tell through computer for pt drug coverage pt seems unsure. Would not add sulfonaurea due to poor oral intake. Pt hesitant to consider trulicity which likely would work well does not feel she can give herself an injection. Metformin 1000 mg bid. Home blood sugar records: patient does not test  Any episodes of hypoglycemia? no  Eye exam current (within one year): yes  Tobacco history: She  reports that she has never smoked. She has never used smokeless tobacco.   Daily Aspirin? No:     Hypertension:  Home blood pressure monitoring: No.  She is adherent to a low sodium diet. Patient denies chest pain, shortness of breath, headache, lightheadedness, blurred vision and peripheral edema. Antihypertensive medication side effects: no medication side effects noted. Use of agents associated with hypertension: none. Hyperlipidemia:  No new myalgias or GI upset on atorvastatin (Lipitor).        Lab Results   Component Value Date    LABA1C 8.0 (H) 07/30/2021    LABA1C 7.0 03/08/2021    LABA1C 6.4 10/14/2020     Lab Results   Component Value Date    LABMICR CANNOT BE CALCULATED 12/08/2020    CREATININE 0.69 07/30/2021     Lab Results   Component Value Date    ALT 17 07/30/2021    AST 14 07/30/2021     Lab Results   Component Value Date    CHOL 164 07/30/2021    TRIG 214 (H) 07/30/2021    HDL 37 (L) 07/30/2021        Chronic insomnia-patient taking Tylenol PM nightly feels like she has some daytime drowsiness we discussed trying not to take more than 3 nights in a row due to the hangover feeling that can come from that patient will limit this more throughout her week and hopefully will be able to sleep and be more restful  Patient originally was on Effexor 75 in addition to a 37.5 which had good control but she stopped at 37.5 due to this daytime drowsiness that was probably caused from the Benadryl and her p.m. regimen    Reviewed prior notes None  Reviewed previous Labs, Imaging and Hospital Records    Past Medical History:   Diagnosis Date    Anxiety     Hyperlipidemia     Hypertension     onset early 55'O    Lichen simplex 0810    shoulders and right leg    Osteopenia 2011    femoral necks jean -1.9, -1.8      Past Surgical History:   Procedure Laterality Date    CHOLECYSTECTOMY      age 35   Cordell Bee COLON SURGERY      colon resection bowel blockage/adhesions-gangrene    COLONOSCOPY  2016    Mikey GORDON    COLONOSCOPY N/A 2021    COLONOSCOPY  repeat in 2 years , result polypectomy Adelfo Zamudio [facility]      x3 pulled    HYSTERECTOMY      kept ovaries       Family History   Problem Relation Age of Onset    Hypertension Mother     Dementia Mother     Hypertension Father     Heart Disease Father         stroke during heart surgery     High Cholesterol Father     Heart Disease Brother         cath with stents    Heart Attack Maternal Grandmother     Other Paternal Grandmother         cerbral aneurysm    Alcohol Abuse Brother     Cancer Brother         kidney       Social History     Tobacco Use    Smoking status: Never Smoker    Smokeless tobacco: Never Used    Tobacco comment: hx smoking-6 years 1/2 ppd or less   Substance Use Topics    Alcohol use: No      Current Outpatient Medications   Medication Sig Dispense Refill    metFORMIN (GLUCOPHAGE) 1000 MG tablet Take 1 tablet by mouth 2 times daily (with meals) 180 tablet 1    losartan (COZAAR) 100 MG tablet Take 1 tablet by mouth daily For hypertension/kidney protection with diabetes 90 tablet 1    dapagliflozin (FARXIGA) 5 MG tablet Take 1 tablet by mouth every morning For diabetes 30 tablet 0    amLODIPine (NORVASC) 10 MG tablet Take 1 tablet by mouth daily 90 tablet 1    venlafaxine (EFFEXOR XR) 75 MG extended release capsule Take 1 capsule by mouth daily 90 capsule 1    atorvastatin (LIPITOR) 20 MG tablet Take 1 tablet by mouth daily 90 tablet 1    doxyLAMINE succinate (SLEEP-AID) 25 MG tablet Take by mouth nightly      Inulin (FIBER CHOICE PO) Take by mouth      aspirin 81 MG tablet Take 81 mg by mouth daily      Cholecalciferol (VITAMIN D PO) Take by mouth      glucose blood VI test strips (ASCENSIA AUTODISC VI;ONE TOUCH ULTRA TEST VI) strip Test sugar Bid and as needed 100 each 3     No current facility-administered medications for this visit. Allergies   Allergen Reactions    Penicillins Hives    Dust Mite Extract Other (See Comments)     headache       Health Maintenance   Topic Date Due    DTaP/Tdap/Td vaccine (1 - Tdap) Never done    Shingles Vaccine (2 of 3) 05/26/2014    Diabetic retinal exam  06/23/2021    Flu vaccine (1) 09/01/2021    Diabetic microalbuminuria test  12/08/2021    Annual Wellness Visit (AWV)  12/08/2021    Breast cancer screen  02/26/2022    Diabetic foot exam  03/08/2022    A1C test (Diabetic or Prediabetic)  07/30/2022    Lipid screen  07/30/2022    Potassium monitoring  07/30/2022    Creatinine monitoring  07/30/2022    Colon cancer screen colonoscopy  03/22/2023    DEXA (modify frequency per FRAX score)  Completed    Pneumococcal 65+ years Vaccine  Completed    COVID-19 Vaccine  Completed    Hepatitis C screen  Completed    Hepatitis A vaccine  Aged Out    Hib vaccine  Aged Out    Meningococcal (ACWY) vaccine  Aged Out       Subjective:      Review of Systems   Constitutional: Negative for activity change, chills and fever. HENT: Negative for congestion and mouth sores. Eyes: Negative. Respiratory: Negative for cough and shortness of breath. Cardiovascular: Negative for chest pain and leg swelling. Endocrine: Negative for cold intolerance and heat intolerance.    Genitourinary: Negative for difficulty urinating. Musculoskeletal: Positive for arthralgias, back pain and gait problem. Skin: Negative for rash. Neurological: Negative for dizziness and headaches. Psychiatric/Behavioral: Positive for decreased concentration and sleep disturbance. Negative for self-injury and suicidal ideas. The patient is nervous/anxious. Objective:     Physical Exam  Vitals and nursing note reviewed. Constitutional:       General: She is not in acute distress. Appearance: Normal appearance. She is well-developed. HENT:      Head: Normocephalic and atraumatic. Right Ear: Tympanic membrane and external ear normal.      Left Ear: Tympanic membrane and external ear normal.      Nose: No congestion. Mouth/Throat:      Mouth: Mucous membranes are moist.   Eyes:      General: No scleral icterus. Pupils: Pupils are equal, round, and reactive to light. Neck:      Vascular: No carotid bruit (neg jean ). Cardiovascular:      Rate and Rhythm: Normal rate and regular rhythm. Heart sounds: Normal heart sounds. No murmur heard. Pulmonary:      Effort: Pulmonary effort is normal. No respiratory distress. Breath sounds: Normal breath sounds. No wheezing. Abdominal:      Palpations: Abdomen is soft. Tenderness: There is no abdominal tenderness. Musculoskeletal:         General: Normal range of motion. Cervical back: Normal range of motion and neck supple. Right lower leg: No edema. Left lower leg: No edema. Lymphadenopathy:      Cervical: No cervical adenopathy. Skin:     General: Skin is warm and dry. Findings: No rash. Neurological:      Mental Status: She is alert and oriented to person, place, and time. Psychiatric:         Behavior: Behavior normal.         Thought Content:  Thought content normal.         Judgment: Judgment normal.       /82 (Site: Left Upper Arm, Position: Sitting, Cuff Size: Medium Adult)   Pulse 100   Wt 200 lb (90.7 kg)   LMP  (LMP Unknown) Comment: partial  SpO2 98%   BMI 36.00 kg/m²     Data:     Lab Results   Component Value Date     07/30/2021    K 4.1 07/30/2021     07/30/2021    CO2 23 07/30/2021    BUN 14 07/30/2021    CREATININE 0.69 07/30/2021    GLUCOSE 182 07/30/2021    GLUCOSE 179 04/12/2012    PROT 7.6 07/30/2021    LABALBU 4.0 07/30/2021    LABALBU 4.0 04/12/2012    BILITOT 0.30 07/30/2021    ALKPHOS 127 07/30/2021    AST 14 07/30/2021    ALT 17 07/30/2021     Lab Results   Component Value Date    WBC 14.1 07/30/2021    RBC 5.23 07/30/2021    HGB 12.9 07/30/2021    HCT 38.9 07/30/2021    MCV 74.4 07/30/2021    MCH 24.6 07/30/2021    MCHC 33.1 07/30/2021    RDW 15.6 07/30/2021     07/30/2021    MPV NOT REPORTED 07/30/2021     Lab Results   Component Value Date    TSH 3.33 07/30/2021     Lab Results   Component Value Date    CHOL 164 07/30/2021    HDL 37 07/30/2021    LABA1C 8.0 07/30/2021          Assessment & Plan       1. Controlled type 2 diabetes mellitus without complication, without long-term current use of insulin (HCC)  Lab Results   Component Value Date    LABA1C 8.0 (H) 07/30/2021     Lab Results   Component Value Date     07/30/2021       - metFORMIN (GLUCOPHAGE) 1000 MG tablet; Take 1 tablet by mouth 2 times daily (with meals)  Dispense: 180 tablet; Refill: 1    2. Pure hypercholesterolemia  On statin stable and at goal   Lab Results   Component Value Date    LDLCHOLESTEROL 84 07/30/2021         3. Essential hypertension, benign  Stable   - losartan (COZAAR) 100 MG tablet; Take 1 tablet by mouth daily For hypertension/kidney protection with diabetes  Dispense: 90 tablet; Refill: 1    4. Screening for osteoporosis    - DEXA BONE DENSITY 2 SITES; Future    5. Postmenopausal estrogen deficiency    - DEXA BONE DENSITY 2 SITES; Future    6. Osteopenia of both hips    - DEXA BONE DENSITY 2 SITES; Future    7.  Diabetes mellitus due to underlying condition with complication, without long-term current use of insulin ([facility] [address]  Need to research diabetes med coverage for pt , seems she is unsure how to do this. 8. Class 2 drug-induced obesity without serious comorbidity with body mass index (BMI) of 36.0 to 36.9 in adult  Consider GLP-1, or SGLT2    9. Anxiety and depression  Well controlled on effexor  Concern with gentlemen asking for money- encouraged pt to speak with son . Completed Refills   Requested Prescriptions     Signed Prescriptions Disp Refills    metFORMIN (GLUCOPHAGE) 1000 MG tablet 180 tablet 1     Sig: Take 1 tablet by mouth 2 times daily (with meals)    losartan (COZAAR) 100 MG tablet 90 tablet 1     Sig: Take 1 tablet by mouth daily For hypertension/kidney protection with diabetes    dapagliflozin (FARXIGA) 5 MG tablet 30 tablet 0     Sig: Take 1 tablet by mouth every morning For diabetes     Return in about 3 months (around 11/2/2021) for diabetes with flu shot. Orders Placed This Encounter   Medications    metFORMIN (GLUCOPHAGE) 1000 MG tablet     Sig: Take 1 tablet by mouth 2 times daily (with meals)     Dispense:  180 tablet     Refill:  1    losartan (COZAAR) 100 MG tablet     Sig: Take 1 tablet by mouth daily For hypertension/kidney protection with diabetes     Dispense:  90 tablet     Refill:  1    dapagliflozin (FARXIGA) 5 MG tablet     Sig: Take 1 tablet by mouth every morning For diabetes     Dispense:  30 tablet     Refill:  0     Orders Placed This Encounter   Procedures    DEXA BONE DENSITY 2 SITES     Standing Status:   Future     Standing Expiration Date:   8/2/2022     Order Specific Question:   Reason for exam:     Answer:   osteopenia screening osteoporosis         Zeeshan Antes received counseling on the following healthy behaviors: nutrition, exercise and medication adherence  Reviewed prior labs and health maintenance. Continue current medications, diet and exercise.   Discussed use, benefit, and side effects of

## 2021-08-03 ENCOUNTER — TELEPHONE (OUTPATIENT)
Dept: PRIMARY CARE CLINIC | Age: 71
End: 2021-08-03

## 2021-08-03 NOTE — TELEPHONE ENCOUNTER
Nadir Stratton is over $200 - asking if there is something cheaper for her to take - she will not be getting the [facility] Maintenance   Topic Date Due    DTaP/Tdap/Td vaccine (1 - Tdap) Never done    Shingles Vaccine (2 of 3) 05/26/2014    Diabetic retinal exam  06/23/2021    Flu vaccine (1) 09/01/2021    Diabetic microalbuminuria test  12/08/2021    Annual Wellness Visit (AWV)  12/08/2021    Breast cancer screen  02/26/2022    Diabetic foot exam  03/08/2022    A1C test (Diabetic or Prediabetic)  07/30/2022    Lipid screen  07/30/2022    Potassium monitoring  07/30/2022    Creatinine monitoring  07/30/2022    Colon cancer screen colonoscopy  03/22/2023    DEXA (modify frequency per FRAX score)  Completed    Pneumococcal 65+ years Vaccine  Completed    COVID-19 Vaccine  Completed    Hepatitis C screen  Completed    Hepatitis A vaccine  Aged Out    Hib vaccine  Aged Out    Meningococcal (ACWY) vaccine  Aged Out             (applicable per patient's age: Cancer Screenings, Depression Screening, Fall Risk Screening, Immunizations)    Hemoglobin A1C (%)   Date Value   07/30/2021 8.0 (H)   03/08/2021 7.0   10/14/2020 6.4     Microalb/Crt.  Ratio (mcg/mg creat)   Date Value   12/08/2020 CANNOT BE CALCULATED     LDL Cholesterol (mg/dL)   Date Value   07/30/2021 84     AST (U/L)   Date Value   07/30/2021 14     ALT (U/L)   Date Value   07/30/2021 17     BUN (mg/dL)   Date Value   07/30/2021 14      (goal A1C is < 7)   (goal LDL is <100) need 30-50% reduction from baseline     BP Readings from Last 3 Encounters:   08/02/21 138/82   03/22/21 (!) 171/88   03/22/21 (!) 158/66    (goal /80)      All Future Testing planned in [facility]:  Lab Frequency Next Occurrence   ABELARDO BEE DIGITAL SCREEN BILATERAL Once 09/08/2021   DEXA BONE DENSITY 2 SITES Once 08/02/2021       Next Visit Date:  Future Appointments   Date Time Provider Soha Rodriguez   8/10/2021 10:30 AM [facility] DEXA ROOM AT [address] [facility] [address] [facility] Rad   8/10/2021 11:30 AM [facility] MAMMOGRAPHY ROOM [facility]   11/1/2021 10:30 AM Flo Jacob, DANA - [facility]            Patient Active Problem List:     Type 2 diabetes, HbA1c goal < 7% ([facility] [address]     Pure hypercholesterolemia     Essential hypertension, benign     Senile osteoporosis     Anxiety state     Dermatophytosis of foot

## 2021-08-04 ENCOUNTER — TELEPHONE (OUTPATIENT)
Dept: PHARMACY | Facility: CLINIC | Age: 71
End: 2021-08-04

## 2021-08-04 ASSESSMENT — ENCOUNTER SYMPTOMS
EYES NEGATIVE: 1
BACK PAIN: 1
SHORTNESS OF BREATH: 0
COUGH: 0

## 2021-08-04 NOTE — TELEPHONE ENCOUNTER
Received a referral:  from PCP in workqueue to review patients medications and make recommendations. Called patient to schedule a time to speak with a pharmacist over the telephone. No answer left VM: Please contact us at [phone] Option #7 to schedule this appointment. Pharmacists are available Monday thru Friday 7:30 AM till 5:30 PM.      Isabel Ramos CPhT.    [address] [facility], toll free: [phone], option 7

## 2021-08-04 NOTE — LETTER
[address]  [facility] [address]           08/05/21     Dear Essie Rangel are eligible for a complete medication therapy review performed by a [facility] [place] licensed clinical pharmacist. This review helps ensure that you are getting the most benefit from the medications you receive and includes the following:  - Review of your medications, including over-the-counter and herbal medications. - Answering questions about your medications and how to get the most benefit from them. - Identifying and helping to prevent potential drug interactions or side effects.  - Possibly identifying less costly alternatives that are equally effective. Under this program, [employer] will work with you and your doctor to manage your drug therapy. Please contact the [address] [place] [address] office to set up a time for your medication review with one of our clinical pharmacists. To contact us call [phone] or [phone], and select Option 7. This will be a phone consult and therefore will not require a trip to the medical office. Please note: This is an optional program.  It is a free service provided to help ensure that your medicines are safe, necessary, and effective. Your participation is encouraged, but not required.     Sincerely,  [address]   Department, toll free: [phone], option 7

## 2021-08-04 NOTE — TELEPHONE ENCOUNTER
Left pt message with information to speak with clinical pharmacist to find affordable option for medication addition for her diabetes control    Mirian Parsons CNP

## 2021-08-04 NOTE — TELEPHONE ENCOUNTER
Pt with increase in glyco , binge eating some with emotion. Recommendations for adding oral med farxiga pt returned call was $200 for 1 month .

## 2021-08-05 NOTE — TELEPHONE ENCOUNTER
2nd Attempt Documentation:  2nd attempt to contact this patient regarding the previous message  CLINICAL PHARMACY: REFERRAL   Patient unavailable at the time of call. Left following message on home TAD: please call back at toll-free [phone] option 7 to retrieve previous message. Letter mailed to patient.

## 2021-08-10 ENCOUNTER — HOSPITAL ENCOUNTER (OUTPATIENT)
Dept: BONE DENSITY | Age: 71
Discharge: HOME | End: 2021-08-12
Payer: MEDICARE

## 2021-08-10 ENCOUNTER — HOSPITAL ENCOUNTER (OUTPATIENT)
Dept: MAMMOGRAPHY | Age: 71
Discharge: HOME | End: 2021-08-12
Payer: MEDICARE

## 2021-08-10 DIAGNOSIS — Z13.820 SCREENING FOR OSTEOPOROSIS: ICD-10-CM

## 2021-08-10 DIAGNOSIS — M85.851 OSTEOPENIA OF BOTH HIPS: ICD-10-CM

## 2021-08-10 DIAGNOSIS — Z78.0 POSTMENOPAUSAL ESTROGEN DEFICIENCY: ICD-10-CM

## 2021-08-10 DIAGNOSIS — Z12.31 BREAST CANCER SCREENING BY MAMMOGRAM: ICD-10-CM

## 2021-08-10 DIAGNOSIS — M85.852 OSTEOPENIA OF BOTH HIPS: ICD-10-CM

## 2021-08-10 PROCEDURE — 77080 DXA BONE DENSITY AXIAL: CPT

## 2021-08-10 PROCEDURE — 77063 BREAST TOMOSYNTHESIS BI: CPT

## 2021-08-10 NOTE — TELEPHONE ENCOUNTER
Incoming call received from the patient - scheduled appt for 8/11/21 at 10 am.    Bella Bloom, PharmD, [facility] // Department, toll free [phone], option 1

## 2021-08-10 NOTE — TELEPHONE ENCOUNTER
Spoke with pt, gave information for her to call and set up appt with pharmacist.   [phone] option 7

## 2021-08-11 ENCOUNTER — SCHEDULED TELEPHONE ENCOUNTER (OUTPATIENT)
Dept: PHARMACY | Facility: CLINIC | Age: 71
End: 2021-08-11

## 2021-08-11 RX ORDER — MULTIVIT-MIN/IRON/FOLIC ACID/K 18-600-40
2000 CAPSULE ORAL EVERY OTHER DAY
COMMUNITY
End: 2021-08-23 | Stop reason: SDUPTHER

## 2021-08-11 RX ORDER — RED YEAST RICE 600 MG
1 CAPSULE ORAL EVERY OTHER DAY
COMMUNITY
End: 2021-08-23 | Stop reason: DRUGHIGH

## 2021-08-11 RX ORDER — ACETAMINOPHEN,DIPHENHYDRAMINE HCL 500; 25 MG/1; MG/1
1 TABLET, FILM COATED ORAL NIGHTLY PRN
COMMUNITY
End: 2021-08-23 | Stop reason: ALTCHOICE

## 2021-08-11 NOTE — TELEPHONE ENCOUNTER
Janumet or Januvia will be the easiest, [place] she has to spend a certain amount before she qualifies.

## 2021-08-11 NOTE — TELEPHONE ENCOUNTER
Provide prescribed farxiga but it is $200 for 30 day supply. Looking for any diabetic medication to add to metformin. Januvia would be ok but is also not affordable without any assistance. Or a combination tablet with metformin such as janumet or synjardy. Thanks!

## 2021-08-11 NOTE — TELEPHONE ENCOUNTER
If I get her in the PAP program both would be free, I would have to get the application ready and information from both her and the provider. I don't need a script, it can take up to a month or longer to get her approved.

## 2021-08-11 NOTE — TELEPHONE ENCOUNTER
Rosita Bryant, DANA - CNP, medication review completed with patient:  - I called patient's insurance for DM med costs. No affordable options for DPP4, SGLTs, combo's with metformin, GLP1  - Only affordable options are older DM meds such as:  Actos 15 mg- $17 for 90ds  Glipizide 5 mg BID- $2.19 for 30 ds, $4.32   - insurance company said these are estimate costs. Patient does have $4100 deductible for meds and still has $3800 to meet her deductible. I do not see her every meeting this medication deductible. It seems she picked a low cost premium high deductible med plan. -  Patient wanted to know if she should  samples from your office, you will prescribe cheaper med, or she can try lifestyle changes    See note below for complete details. Thank you,  Erin Chan, PharmD, [address]  Pharmacist  Department: [phone]  =======================================================    CLINICAL PHARMACY NOTE - Medication Review  Patient outreach to review medications - Spoke with patient. SUBJECTIVE/OBJECTIVE:   Willi Alicea is a 80 y.o. female referred to a clinical pharmacy specialist by provider and/or given their history of DM and cost of DM med. Called patient's united health insurance who asked me to call [employer]  [phone]- [employer] rx. Patient did not have this med coverage. Called [facility]- she has [employer] part D prescription insurance.   jardiance 10 mg- $592.42 for 90 day. All meds in this class this are similar price.      januvia- 25 mg- $195 for 30 day    janumet 50/500 BID $195 for 30 day  synjardy 5/500 BID $215 for 30 day    Ozempic $334 for 30 ds    Actos 15 mg- $17 for 90ds  Glipizide 5 mg BID- $2.19 for 30 ds, $4.32    Medications:  Medication Sig    metFORMIN (GLUCOPHAGE) 1000 MG tablet Take 1 tablet by mouth 2 times daily (with meals)    losartan (COZAAR) 100 MG tablet Take 1 tablet by mouth daily For hypertension/kidney protection with diabetes    dapagliflozin (FARXIGA) 5 MG tablet Take 1 tablet by mouth every morning For diabetes  -is not taking due to cost    amLODIPine (NORVASC) 10 MG tablet Take 1 tablet by mouth daily    venlafaxine (EFFEXOR XR) 75 MG extended release capsule Take 1 capsule by mouth daily    atorvastatin (LIPITOR) 20 MG tablet Take 1 tablet by mouth daily    doxyLAMINE succinate (SLEEP-AID) 25 MG tablet Take by mouth nightly  - was instructed to take prn per provider and try not to rely on meds for sleep    Inulin (FIBER CHOICE PO) Take by mouth    aspirin 81 MG tablet Take 81 mg by mouth daily    Cholecalciferol (VITAMIN D PO) Take by mouth  2000 units QOD (takes on days does not take calcium plus D)    glucose blood VI test strips (ASCENSIA AUTODISC VI;ONE TOUCH ULTRA TEST VI) strip Test sugar Bid and as needed       Additional Medications (including OTC/Herbal Supplements):  · Calcium plus D QOD- she was not sure of strength     Allergies:    Allergies   Allergen Reactions    Penicillins Hives    Dust Mite Extract Other (See Comments)     headache       Pertinent Labs/Vitals:  BP Readings from Last 3 Encounters:   08/02/21 138/82   03/22/21 (!) 171/88   03/22/21 (!) 158/66     Lab Results   Component Value Date    LABMICR CANNOT BE CALCULATED 12/08/2020     Lab Results   Component Value Date    LABA1C 8.0 (H) 07/30/2021    LABA1C 7.0 03/08/2021    LABA1C 6.4 10/14/2020     Lab Results   Component Value Date    CHOL 164 07/30/2021    TRIG 214 (H) 07/30/2021    HDL 37 (L) 07/30/2021    LDLCHOLESTEROL 84 07/30/2021     ALT   Date Value Ref Range Status   07/30/2021 17 5 - 33 U/L Final     AST   Date Value Ref Range Status   07/30/2021 14 <32 U/L Final     The 10-year ASCVD risk score (Haylee Barros., et al., 2013) is: 26.6%    Values used to calculate the score:      Age: 80 years      Sex: Female      Is Non- : No      Diabetic: Yes      Tobacco smoker: No Systolic Blood Pressure: 730 mmHg      Is BP treated: Yes      HDL Cholesterol: 37 mg/dL      Total Cholesterol: 164 mg/dL     Lab Results   Component Value Date    CREATININE 0.69 07/30/2021       CrCl cannot be calculated (Unknown ideal weight. ). eGFR: > 60 mL/min/1.73 m^2    Social History:   Social History     Tobacco Use    Smoking status: Never Smoker    Smokeless tobacco: Never Used    Tobacco comment: hx smoking-6 years 1/2 ppd or less   Substance Use Topics    Alcohol use: No       Immunizations:   Most Recent Immunizations   Administered Date(s) Administered    COVID-19, Pfizer, PF, 30mcg/0.3mL 03/29/2021    Influenza Virus Vaccine 12/08/2014    Influenza, Quadv, IM, PF (6 mo and older Fluzone, Flulaval, Fluarix, and 3 yrs and older Afluria) 01/29/2018    Influenza, Quadv, adjuvanted, 65 yrs +, IM, PF (Fluad) 09/13/2020    Influenza, Triv, inactivated, subunit, adjuvanted, IM (Fluad 65 yrs and older) 11/07/2019    Pneumococcal Conjugate 13-valent ([id number]) 08/03/2016    Pneumococcal Polysaccharide ([id number]) 11/11/2019    Zoster Live (Zostavax) 03/31/2014       ASSESSMENT/PLAN:   - General Assessment:   · Adherence: does not use pill box. Just takes them out of the bottle but states she does not miss doses. She just takes everything in the morning bc once a day except metformin is BID. · Cost: Paula Peterson is $200  · Current pharmacy/pharmacies: I called [facility]- she has [facility] part D $209.38 for farxiga for 30 ds. [phone], ID [id number]  · Drug interactions: No clinically significant interactions identified via [employer] Interaction Analysis as category D or higher. · Renal dosing: No renal adjustments necessary. · Immunizations: due for shingles  · Smoking status: never  · Blood pressure: Is at BP target. Was elevated but was stressed, is starting to come down  · Lipids: is tolerating atorvastatin.   · Other: sleep hygiene pt states falls asleep ok but hard to stay asleept    - Diabetes:    Glycemic goal: <7.0% and directed by provider. Is not at blood glucose goal which may be related to changes in diet. Recommend extra attention to diet. and which may be related to changes in physical activity. Recommend resuming physical activity.  Current symptoms/problems: neuropathy- gets better and worse, ball of foot and toes   Home blood sugar records: was not checking bc it was under control. Patient admits she has not been checking but does have a meter and supplies.  Any episodes of hypoglycemia: no   Current testing supplies/frequency: patient states supplies are not . Get through drug [address] and states not a cost issue. Patient agreeable to start testing and provide log at [facility] Discussed and reminded patient regarding diabetic eye exams   Therapy optimization: patient would like to use diet instead of adding another medication to get her A1c back to goal. Discussed importance of getting sugars back under control.  Diet/exercise: states has had a lot of stress and has been stress eating and has not has any physical activity. But knows she has to walk.  Patient aware and states has info on DM diet- she is trying to change her diet back to how it was when A1c was lower      - Upcoming appointments:   Future Appointments   Date Time Provider Soha Rodrigeuz   2021  1:30 PM Radha Eve, APRN - CNP [facility]   2021 10:30 AM DANA Flowers CNP MED [facility]       Yuli Curtis, PharmD, [address] [facility] Pharmacist  Department: [phone]

## 2021-08-11 NOTE — TELEPHONE ENCOUNTER
Which meds are you seeking assistance on? I looked over her drug list but don't see much I can help with there but possibly on Januvia ect. ... Please let me know so I can move forward.

## 2021-08-12 ENCOUNTER — CARE COORDINATION (OUTPATIENT)
Dept: CARE COORDINATION | Age: 71
End: 2021-08-12

## 2021-08-12 RX ORDER — LANCETS 30 GAUGE
EACH MISCELLANEOUS
Qty: 100 EACH | Refills: 1 | Status: SHIPPED | OUTPATIENT
Start: 2021-08-12

## 2021-08-12 NOTE — TELEPHONE ENCOUNTER
I will call the patient and talk about Janumet, the program for [place] she must spend a certain amount before she qualifies so that's why I would recommend Janumet.

## 2021-08-12 NOTE — TELEPHONE ENCOUNTER
Sam did not realize PAP program information listed too would go to whitumet if possible to minimize the amount of meds for better compliance. I also received indigent for Han Francoisjohnson is that an option for her? I will scan info into media for your review. Otherwise her covered meds with the high deductible plan would do actos if nothing else available , I can give her samples of janumet to trial if that is what we are headed to.  Let me know    Thanks  Les Bravo CNP

## 2021-08-12 NOTE — CARE COORDINATION
I spoke with the patient and quickly went over the program available for Lucie Ayers. She had to go so I just let her know I will be mailing her the information soon.

## 2021-08-12 NOTE — TELEPHONE ENCOUNTER
Ok great! Yes then janumet would be good to try. I know she has not spend much bc still has $3800 out $4100 for her med deductible left. Let us know what else you need Deena Collado. Thank you! DANA Carballo - CNP  - please work with Deena Collado and prescribe dose of janumet you would like. - let me know if I need to call patient back to educate on new med    Thank you!   Sven Krishnan, PharmD, [address] [facility] Pharmacist  Department: [phone]

## 2021-08-12 NOTE — TELEPHONE ENCOUNTER
I can help her with either medication I just need to know which one before I call the patient please.

## 2021-08-12 NOTE — TELEPHONE ENCOUNTER
Since pt eats inconsistently and skips meals often being  and only nibbling/snacking instead of full meals I feel the actos will be safer for her. Encourage her to change plans so we can use better meds to help with weight loss and diabetes control.      Thanks  Clifm Aschoff CNP

## 2021-08-17 NOTE — TELEPHONE ENCOUNTER
Pt was given samples x 1 month janumet 50/1000 mg bid . Need to know if she wants meds sent local for script for janumet or mail in please.      Thanks  Reva Quiros CNP

## 2021-08-18 ENCOUNTER — CARE COORDINATION (OUTPATIENT)
Dept: CARE COORDINATION | Age: 71
End: 2021-08-18

## 2021-08-18 NOTE — CARE COORDINATION
I sent the patient information for re enrollment into the PAP program for 2021. I also sent the provider their portion of the application.        Chandana Valencia [address]   Medication Assistance  [place]    X)[phone] (l)[phone]

## 2021-08-18 NOTE — TELEPHONE ENCOUNTER
No paper scripts are needed, I send the provider everything that I need them to fill out. I was just in the office this morning and faxed them their portion of the application to return to me. I always send the provider what I need I never want them to just send me scripts because that's typically not how most PAP programs work.

## 2021-08-18 NOTE — TELEPHONE ENCOUNTER
Mine Lopez-  Where does Ms. Jerry Zavala send prescription for janumet? Does patient need a paper copy for pap program? Or how does that work. Thanks!   Mikey Fernandez

## 2021-08-23 ENCOUNTER — OFFICE VISIT (OUTPATIENT)
Dept: FAMILY MEDICINE CLINIC | Age: 71
End: 2021-08-23
Payer: MEDICARE

## 2021-08-23 VITALS
WEIGHT: 193 LBS | HEART RATE: 87 BPM | OXYGEN SATURATION: 99 % | BODY MASS INDEX: 34.74 KG/M2 | DIASTOLIC BLOOD PRESSURE: 80 MMHG | SYSTOLIC BLOOD PRESSURE: 130 MMHG

## 2021-08-23 DIAGNOSIS — M81.0 AGE-RELATED OSTEOPOROSIS WITHOUT CURRENT PATHOLOGICAL FRACTURE: Primary | ICD-10-CM

## 2021-08-23 DIAGNOSIS — F32.A ANXIETY AND DEPRESSION: ICD-10-CM

## 2021-08-23 DIAGNOSIS — I10 ESSENTIAL HYPERTENSION, BENIGN: ICD-10-CM

## 2021-08-23 DIAGNOSIS — E11.9 CONTROLLED TYPE 2 DIABETES MELLITUS WITHOUT COMPLICATION, WITHOUT LONG-TERM CURRENT USE OF INSULIN (HCC): ICD-10-CM

## 2021-08-23 DIAGNOSIS — F41.9 ANXIETY AND DEPRESSION: ICD-10-CM

## 2021-08-23 PROCEDURE — G8399 PT W/DXA RESULTS DOCUMENT: HCPCS | Performed by: NURSE PRACTITIONER

## 2021-08-23 PROCEDURE — G8417 CALC BMI ABV UP PARAM F/U: HCPCS | Performed by: NURSE PRACTITIONER

## 2021-08-23 PROCEDURE — 1036F TOBACCO NON-USER: CPT | Performed by: NURSE PRACTITIONER

## 2021-08-23 PROCEDURE — 1123F ACP DISCUSS/DSCN MKR DOCD: CPT | Performed by: NURSE PRACTITIONER

## 2021-08-23 PROCEDURE — G8427 DOCREV CUR MEDS BY ELIG CLIN: HCPCS | Performed by: NURSE PRACTITIONER

## 2021-08-23 PROCEDURE — 99213 OFFICE O/P EST LOW 20 MIN: CPT | Performed by: NURSE PRACTITIONER

## 2021-08-23 PROCEDURE — 4040F PNEUMOC VAC/ADMIN/RCVD: CPT | Performed by: NURSE PRACTITIONER

## 2021-08-23 PROCEDURE — 1090F PRES/ABSN URINE INCON ASSESS: CPT | Performed by: NURSE PRACTITIONER

## 2021-08-23 PROCEDURE — 3052F HG A1C>EQUAL 8.0%<EQUAL 9.0%: CPT | Performed by: NURSE PRACTITIONER

## 2021-08-23 PROCEDURE — 3017F COLORECTAL CA SCREEN DOC REV: CPT | Performed by: NURSE PRACTITIONER

## 2021-08-23 PROCEDURE — 2022F DILAT RTA XM EVC RTNOPTHY: CPT | Performed by: NURSE PRACTITIONER

## 2021-08-23 RX ORDER — RED YEAST RICE 600 MG
1 CAPSULE ORAL DAILY
Qty: 60 CAPSULE | Status: SHIPPED | COMMUNITY
Start: 2021-08-23

## 2021-08-23 RX ORDER — MULTIVIT-MIN/IRON/FOLIC ACID/K 18-600-40
2000 CAPSULE ORAL DAILY
Qty: 30 CAPSULE | Refills: 2 | Status: SHIPPED | OUTPATIENT
Start: 2021-08-23 | End: 2022-04-11

## 2021-08-23 RX ORDER — SITAGLIPTIN AND METFORMIN HYDROCHLORIDE 1000; 50 MG/1; MG/1
1 TABLET, FILM COATED ORAL 2 TIMES DAILY WITH MEALS
Qty: 28 TABLET | Refills: 0 | COMMUNITY
Start: 2021-08-23 | End: 2021-08-24 | Stop reason: SDUPTHER

## 2021-08-23 NOTE — PROGRESS NOTES
[facility]  [facility]  [address] [facility]  [place] [address]  Dept: [phone]  Dept Fax: [phone]    Last encounter 8/2/2021    Discuss Medications (Pt here to discuss Prolia medication.) and Medication Problem (Pt states that after taking the janumet she has nausea and diarrhea.)       HPI:   Nai Francois is a 80 y.o. female who presentstoday for her medical conditions/complaints as noted below. Nai Francois is c/o of Discuss Medications (Pt here to discuss Prolia medication.) and Medication Problem (Pt states that after taking the janumet she has nausea and diarrhea.)      HPI  Established patient    Pt here to discuss osteoporosis concern in left hip area on recent dexa scan. On calcium and vitamin D only taking it every other day due to c/o constipation when starting it. Discussed prolia injection for treatment Q 6 months and pt agreeable. Dexa 8/10/2021  FINDINGS: L1 through L4 bone mineral density 1.097 g/sq cm with a T-score of    -0.7. This is normal. It shows a 4.4% decrease from 2017.       The bone mineral density of the femoral necks shows osteopenia on the left at    0.685 g/sq cm with a T-score of -2. 5.       Overall the hips show a 9.4% decrease from 2017.       The prior left femoral neck T-score was -1.9.               Impression       Osteoporosis is now present in the left femoral neck.       The 10 year probability of major osteoporotic fracture is 21.6% and hip    fracture risk is 5.4%.                 Pt clarified CHO intake is 45 grams per meal.  Was under impression 9 grams per meal in her mind. Pt is forgetful and spacey with discussion today blood sugar checked in office 123. Given peanut butter crackers. Weight loss 7 pounds   Wt Readings from Last 3 Encounters:   08/23/21 193 lb (87.5 kg)   08/02/21 200 lb (90.7 kg)   03/29/21 196 lb (88.9 kg)       janumet given  By samples due to pt insurance coverage is poor coverage.  When investigated by pharmacist:     \" I called patient's insurance for DM med costs. No affordable options for DPP4, SGLTs, combo's with metformin, GLP1  - Only affordable options are older DM meds such as:  Actos 15 mg- $17 for 90ds  Glipizide 5 mg BID- $2.19 for 30 ds, $4.32   - insurance company said these are estimate costs. Patient does have $4100 deductible for meds and still has $3800 to meet her deductible. I do not see her every meeting this medication deductible. It seems she picked a low cost premium high deductible med plan. -  Patient wanted to know if she should  samples from your office, you will prescribe cheaper med, or she can try lifestyle changes\"     Thank you,  Jacky Mena, PharmD, [address]  Pharmacist  Department: [phone]    Cinnamon 1000 mg  Pt desires to take, her brother does and she feels it will help . I asked her with med changes to please wait 1 month to start this.      Reviewed prior notes None  Reviewed previous Labs, Imaging and Hospital Records    Past Medical History:   Diagnosis Date    Anxiety     Hyperlipidemia     Hypertension     onset early 38'L    Lichen simplex 0384    shoulders and right leg    Osteopenia 2011    femoral necks jean -1.9, -1.8      Past Surgical History:   Procedure Laterality Date    CHOLECYSTECTOMY      age 35   [employer] COLON SURGERY      colon resection bowel blockage/adhesions-gangrene    COLONOSCOPY  2016    Mikey GORDON    COLONOSCOPY N/A 2021    COLONOSCOPY  repeat in 2 years , result polypectomy Janus Severe, MD at [address] [place] [address]      x3 pulled    HYSTERECTOMY      kept ovaries       Family History   Problem Relation Age of Onset    Hypertension Mother     Dementia Mother     Hypertension Father     Heart Disease Father         stroke during heart surgery     High Cholesterol Father     Heart Disease Brother         cath with stents    Heart Attack Maternal Grandmother     Other Paternal Grandmother         cerbral aneurysm    Alcohol Abuse Brother     Cancer Brother         kidney       Social History     Tobacco Use    Smoking status: Never Smoker    Smokeless tobacco: Never Used    Tobacco comment: hx smoking-6 years 1/2 ppd or less   Substance Use Topics    Alcohol use: No      Current Outpatient Medications   Medication Sig Dispense Refill    sitaGLIPtan-metFORMIN (JANUMET)  MG per tablet Take 1 tablet by mouth 2 times daily (with meals) 28 tablet 0    Calcium Carbonate-Vitamin D (CALCIUM PLUS VITAMIN D) 500-50 MG-UNIT CAPS Take 1 capsule by mouth daily 60 capsule     Vitamin D, Cholecalciferol, 50 MCG (2000 UT) CAPS Take 2,000 Units by mouth daily For vitamin D deficiency 30 capsule 2    blood glucose test strips (ASCENSIA AUTODISC VI;ONE TOUCH ULTRA TEST VI) strip Test sugar Bid and as needed Dx E 11.9 100 each 3    Lancets MISC Testing Blood sugar once daily and as needed. 100 each 1    TGT PSYLLIUM FIBER PO Take 2 capsules by mouth daily      losartan (COZAAR) 100 MG tablet Take 1 tablet by mouth daily For hypertension/kidney protection with diabetes 90 tablet 1    amLODIPine (NORVASC) 10 MG tablet Take 1 tablet by mouth daily 90 tablet 1    venlafaxine (EFFEXOR XR) 75 MG extended release capsule Take 1 capsule by mouth daily 90 capsule 1    atorvastatin (LIPITOR) 20 MG tablet Take 1 tablet by mouth daily 90 tablet 1    aspirin 81 MG tablet Take 81 mg by mouth daily       No current facility-administered medications for this visit.      Allergies   Allergen Reactions    Penicillins Hives    Dust Mite Extract Other (See Comments)     headache       Health Maintenance   Topic Date Due    DTaP/Tdap/Td vaccine (1 - Tdap) Never done    Shingles Vaccine (2 of 3) 05/26/2014    Diabetic retinal exam  06/23/2021    Flu vaccine (1) 09/01/2021    Diabetic microalbuminuria test  12/08/2021   [employer] Visit (AWV)  12/08/2021    Diabetic foot exam  03/08/2022    A1C test (Diabetic or Prediabetic)  07/30/2022    Lipid screen  07/30/2022    Potassium monitoring  07/30/2022    Creatinine monitoring  07/30/2022    Colon cancer screen colonoscopy  03/22/2023    Breast cancer screen  08/10/2023    DEXA (modify frequency per FRAX score)  Completed    Pneumococcal 65+ years Vaccine  Completed    COVID-19 Vaccine  Completed    Hepatitis C screen  Completed    Hepatitis A vaccine  Aged Out    Hib vaccine  Aged Out    Meningococcal (ACWY) vaccine  Aged Out       Subjective:      Review of Systems   Constitutional: Positive for activity change and appetite change. Negative for chills and fever. HENT: Negative for congestion, rhinorrhea and sore throat. Respiratory: Negative for cough. Gastrointestinal: Positive for abdominal distention, diarrhea and nausea. Negative for vomiting. Genitourinary: Negative for difficulty urinating. Musculoskeletal: Negative for arthralgias. Skin: Negative for rash. Neurological: Negative for dizziness and headaches. Psychiatric/Behavioral: The patient is not nervous/anxious. Objective:     Physical Exam  Vitals and nursing note reviewed. Constitutional:       General: She is not in acute distress. Appearance: Normal appearance. She is well-developed. HENT:      Head: Normocephalic and atraumatic. Right Ear: Tympanic membrane and external ear normal.      Left Ear: Tympanic membrane and external ear normal.      Nose: No congestion. Mouth/Throat:      Mouth: Mucous membranes are moist.   Eyes:      General: No scleral icterus. Pupils: Pupils are equal, round, and reactive to light. Neck:      Vascular: No carotid bruit (neg jean ). Cardiovascular:      Rate and Rhythm: Normal rate and regular rhythm. Heart sounds: Normal heart sounds. No murmur heard.      Pulmonary:      Effort: Pulmonary effort is normal. No respiratory distress. Breath sounds: Normal breath sounds. No wheezing. Abdominal:      Palpations: Abdomen is soft. Tenderness: There is no abdominal tenderness. Musculoskeletal:         General: Normal range of motion. Cervical back: Normal range of motion and neck supple. Right lower leg: No edema. Left lower leg: No edema. Lymphadenopathy:      Cervical: No cervical adenopathy. Skin:     General: Skin is warm and dry. Findings: No rash. Neurological:      Mental Status: She is alert and oriented to person, place, and time. Psychiatric:         Behavior: Behavior normal.         Thought Content: Thought content normal.         Judgment: Judgment normal.       /80 (Site: Left Upper Arm, Position: Sitting, Cuff Size: Medium Adult)   Pulse 87   Wt 193 lb (87.5 kg)   LMP  (LMP Unknown) Comment: partial  SpO2 99%   BMI 34.74 kg/m²     Data:     Lab Results   Component Value Date     07/30/2021    K 4.1 07/30/2021     07/30/2021    CO2 23 07/30/2021    BUN 14 07/30/2021    CREATININE 0.69 07/30/2021    GLUCOSE 182 07/30/2021    GLUCOSE 179 04/12/2012    PROT 7.6 07/30/2021    LABALBU 4.0 07/30/2021    LABALBU 4.0 04/12/2012    BILITOT 0.30 07/30/2021    ALKPHOS 127 07/30/2021    AST 14 07/30/2021    ALT 17 07/30/2021     Lab Results   Component Value Date    WBC 14.1 07/30/2021    RBC 5.23 07/30/2021    HGB 12.9 07/30/2021    HCT 38.9 07/30/2021    MCV 74.4 07/30/2021    MCH 24.6 07/30/2021    MCHC 33.1 07/30/2021    RDW 15.6 07/30/2021     07/30/2021    MPV NOT REPORTED 07/30/2021     Lab Results   Component Value Date    TSH 3.33 07/30/2021     Lab Results   Component Value Date    CHOL 164 07/30/2021    HDL 37 07/30/2021    LABA1C 8.0 07/30/2021          Assessment & Plan       1. Age-related osteoporosis without current pathological fracture  prolia SQ Q 6 months.     - Calcium Carbonate-Vitamin D (CALCIUM PLUS VITAMIN D) 500-50 MG-UNIT CAPS;  Take 1 capsule by mouth daily  Dispense: 60 capsule  - Vitamin D, Cholecalciferol, 50 MCG (2000 UT) CAPS; Take 2,000 Units by mouth daily For vitamin D deficiency  Dispense: 30 capsule; Refill: 2    2. Controlled type 2 diabetes mellitus without complication, without long-term current use of insulin ([facility] [place] [address]  Lab Results   Component Value Date    LABA1C 8.0 (H) 07/30/2021     Lab Results   Component Value Date     07/30/2021     Pt given peanut butter crackers while in office and improved with mentation immediately. - sitaGLIPtan-metFORMIN (JANUMET)  MG per tablet; Take 1 tablet by mouth 2 times daily (with meals)  Dispense: 28 tablet; Refill: 0    3. Anxiety and depression  Continue effexor well controlled      4. Essential hypertension, benign  Stable and controlled. Pt with confusion of meds , now aware only janumet, for diabetes. Will in future transition to [address] and metformin in future. Good hydration. Eat better. Med list given . Left message for son to call. Completed Refills   Requested Prescriptions     Signed Prescriptions Disp Refills    Vitamin D, Cholecalciferol, 50 MCG (2000 UT) CAPS 30 capsule 2     Sig: Take 2,000 Units by mouth daily For vitamin D deficiency    sitaGLIPtan-metFORMIN (JANUMET)  MG per tablet 28 tablet 0     Sig: Take 1 tablet by mouth 2 times daily (with meals)     No follow-ups on file.   Orders Placed This Encounter   Medications    DISCONTD: sitaGLIPtan-metFORMIN (JANUMET)  MG per tablet     Sig: Take 1 tablet by mouth 2 times daily (with meals)     Dispense:  28 tablet     Refill:  0    Vitamin D, Cholecalciferol, 50 MCG (2000 UT) CAPS     Sig: Take 2,000 Units by mouth daily For vitamin D deficiency     Dispense:  30 capsule     Refill:  2    sitaGLIPtan-metFORMIN (JANUMET)  MG per tablet     Sig: Take 1 tablet by mouth 2 times daily (with meals)     Dispense:  28 tablet     Refill:  0     [id number] exp 5/2022 No orders of the defined types were placed in this encounter. Matthias Baez received counseling on the following healthy behaviors: nutrition, exercise and medication adherence  Reviewed prior labs and health maintenance. Continue current medications, diet and exercise. Discussed use, benefit, and side effects of prescribed medications. Barriers to medication compliance addressed. Patient given educational materials - see patient instructions. All patient questions answered. Patient voiced understanding. On this date 8/23/2021 I have spent 24 minutes reviewing previous notes, test results and face to face with the patient discussing the diagnosis and importance of compliance with the treatment plan as well as documenting on the day of the visit.      Electronically signed by DANA Mendoza CNP on 8/24/2021 at 11:59 PM

## 2021-08-23 NOTE — PATIENT INSTRUCTIONS
You may be receiving a survey from [employer] regarding your visit today. You may get this in the mail, through your MyChart or in your email. Please complete the survey to enable us to provide the highest quality of care to you and your family. If you cannot score us as very good ( 5 Stars) on any question, please feel free to call the office to discuss how we could have made your experience exceptional.     Thank You! DANA Gallardo-CNP  [address] [facility]  [place], [place]    Phone: [phone]  Fax: [phone] [facility] Office Hours:  Monday: [facility] office location 8-5 ([phone]) Offering additional late hours the first Monday of the month until 7 pm.   Tuesday: 8-5 Wednesday: 8-5 Thursday:  Additional hours offered 2 Thursdays a month. Please call to inquire those dates. Fridays: 7:30-4:30        - I called patient's insurance for DM med costs. No affordable options for DPP4, SGLTs, combo's with metformin, GLP1  - Only affordable options are older DM meds such as:  Actos 15 mg- $17 for 90ds  Glipizide 5 mg BID- $2.19 for 30 ds, $4.32   - insurance company said these are estimate costs. Patient does have $4100 deductible for meds and still has $3800 to meet her deductible. I do not see her every meeting this medication deductible. It seems she picked a low cost premium high deductible med plan. -  Patient wanted to know if she should  samples from your office, you will prescribe cheaper med, or she can try lifestyle changes    Patient Education        Osteoporosis: Care Instructions  Overview     Osteoporosis causes bones to become thin and weak. It is much more common in women than in men. Your chances of getting this disease depend on several things. These factors include the thickness of your bones (bone density), as well as health, diet, and physical activity. This disease may be very advanced before you know you have it.  Sometimes the first sign is a broken bone in the hip, spine, or wrist. Or you may have sudden pain in your middle or lower back. Follow-up care is a key part of your treatment and safety. Be sure to make and go to all appointments, and call your doctor if you are having problems. It's also a good idea to know your test results and keep a list of the medicines you take. How can you care for yourself at home? · Your doctor may prescribe a bisphosphonate, such as risedronate (Actonel) or alendronate (Fosamax), for osteoporosis. If you are taking one of these medicines by mouth:  ? Take your medicine with a full glass of water when you first get up in the morning. ? Do not lie down, eat, drink a beverage, or take any other medicine for at least 30 minutes after taking the drug. This helps prevent stomach problems. ? Do not take your medicine late in the day if you forgot to take it in the morning. Skip it, and take the usual dose the next morning. ? If you have side effects, tell your doctor. Your doctor may prescribe another medicine. · Get enough calcium and vitamin D. The recommended daily allowances (RDAs) for adults younger than age 46 are 1,000 mg of calcium and 600 IU of vitamin D each day. Women ages 46 to 79 need 1,200 mg of calcium and 600 IU of vitamin D each day. Men ages 46 to 79 need 1,000 mg of calcium and 600 IU of vitamin D each day. Adults 71 and older need 1,200 mg of calcium and 800 IU of vitamin D each day. It's not clear if people who already have osteoporosis need more calcium and vitamin D than this. Talk to your doctor about what's right for you.  ? Eat foods rich in calcium, like yogurt, cheese, milk, and dark green vegetables. This is a good way to get the calcium you need. You can get vitamin D from eggs, fatty fish, cereal, and milk. ? Ask your doctor if you need to take a calcium plus vitamin D supplement. You may be able to get enough calcium and vitamin D through your diet. Be careful with supplements.  Adults ages 23 to 48 should not get more than 2,500 mg of calcium and 4,000 IU of vitamin D each day, whether it is from supplements and/or food. Adults ages 46 and older should not get more than 2,000 mg of calcium and 4,000 IU of vitamin D each day from supplements and/or food. · Limit alcohol to 2 drinks a day for men and 1 drink a day for women. Too much alcohol can cause health problems. · Do not smoke. Smoking puts you at a much higher risk for osteoporosis. If you need help quitting, talk to your doctor about stop-smoking programs and medicines. These can increase your chances of quitting for good. · Get regular bone-building exercise. Weight-bearing and resistance exercises keep bones healthy by working the muscles and bones against gravity. Start out at an exercise level that feels right for you. Add a little at a time until you can do the following:  ? Do 30 minutes of weight-bearing exercise on most days of the week. Walking, jogging, stair climbing, and dancing are good choices. ? Do resistance exercises with weights or elastic bands 2 to 3 days a week. · Reduce your risk of falls:  ? Wear supportive shoes with low heels and nonslip soles. ? Use a cane or walker, if you need it. Use shower chairs and bath benches. Put in handrails on stairways, around your shower or tub area, and near the toilet. ? Keep stairs, porches, and walkways well lit. Use night-lights. ? Remove throw rugs and other objects that are in the way. ? Avoid icy, wet, or slippery surfaces. ? Keep a cordless phone and a flashlight with new batteries by your bed. When should you call for help? Watch closely for changes in your health, and be sure to contact your doctor if you have any problems. Where can you learn more? Go to https://jordin.[employer]. org and sign in to your [employer] account. Enter K100 in the [facility] box to learn more about \"Osteoporosis: Care Instructions. \"     If you do not have an account, please click on the \"Sign Up Now\" link. Current as of: December 7, 2020               Content Version: 12.9  © 2006-2021 [employer]. Care instructions adapted under license by [facility] [facility] ([facility]). If you have questions about a medical condition or this instruction, always ask your healthcare professional. [address] any warranty or liability for your use of this information. Patient Education        alendronate  Pronunciation:  michael donahue  Brand:  Billy[employer] Fosamax  What is the most important information I should know about alendronate? You should not take alendronate if you have problems with your esophagus, or low levels of calcium in your blood. Do not take alendronate if you cannot sit upright or stand for at least 30 minutes after taking the medicine. Alendronate can cause serious problems in the stomach or esophagus. Stop using alendronate and call your doctor at once if you have chest pain, new or worsening heartburn, or pain when swallowing. Also call your doctor if you have muscle spasms, numbness or tingling (in hands and feet or around the mouth), new or unusual hip pain, or severe pain in your joints, bones, or muscles. What is alendronate? Alendronate is used to treat osteoporosis caused by menopause, steroid use, or gonadal failure. This medicine is for use when you have a high risk of bone fracture due to osteoporosis. Alendronate is also used to treat Paget's disease of bone. Alendronate may also be used for purposes not listed in this medication guide. What should I discuss with my healthcare provider before taking alendronate? You should not take alendronate if you are allergic to it, or if you have:  · low levels of calcium in your blood (hypocalcemia); or  · problems with the muscles in your esophagus (the tube that connects your mouth and stomach). Do not take alendronate if you cannot sit upright or stand for at least 30 minutes. Alendronate can cause serious problems in the stomach or esophagus. You must stay upright for at least 30 minutes after taking this medicine. Tell your doctor if you have ever had:  · trouble swallowing;  · problems with your stomach or digestion;  · hypocalcemia;  · a dental problem (you may need a dental exam before you begin taking alendronate);  · kidney disease; or  · any condition that makes it hard for your body to absorb nutrients from food (malabsorption). The effervescent tablet contains a lot of sodium. Tell your doctor if you are on a low-salt diet before using this form of alendronate. This medicine may cause jaw bone problems (osteonecrosis). The risk is highest in people with cancer, blood cell disorders, pre-existing dental problems, or people treated with steroids, chemotherapy, or radiation. Ask your doctor about your own risk. It is not known whether this medicine will harm an unborn baby. Tell your doctor if you are pregnant or trying to become pregnant. Stop using the medicine and tell your doctor right away if you become pregnant. It may not be safe to breastfeed while using this medicine. Ask your doctor about any risk. How should I take alendronate? Follow all directions on your prescription label and read all medication guides or instruction sheets. Use the medicine exactly as directed. Alendronate is taken either once daily or once per week. Follow your doctor's dosing instructions very carefully. Take alendronate first thing in the morning, at least 30 minutes before you eat or drink anything or take any other medicine. If you take alendronate only once per week, take it on the same day each week and always first thing in the morning. Take with a full glass (6 to 8 ounces) of plain water. Do not use coffee, tea, soda, juice, or mineral water. Do not eat or drink anything other than plain water. Measure liquid medicine carefully.  Use the dosing syringe provided, or use a medicine dose-measuring device (not a kitchen spoon). Do not crush, chew, or suck on an alendronate regular tablet. Swallow it whole. Dissolve the effervescent tablet in at least 4 ounces of water (at room temperature, not hot or cold). Let the tablet dissolve for 5 minutes. Stir this mixture for 10 seconds and drink all of it right away. Add a little more water to the glass, swirl gently and drink right away. For at least 30 minutes after taking alendronate:   · Do not lie down or recline. · Do not take any other medicine including vitamins, calcium, or antacids. Pay special attention to your dental hygiene while taking alendronate. Brush and floss your teeth regularly. If you need to have any dental work (especially surgery), tell the dentist ahead of time that you are using alendronate. Alendronate is only part of a complete program of treatment that may also include diet changes, exercise, bone mineral density testing, and taking calcium and vitamin supplements. Follow your doctor's instructions very closely. Store at room temperature away from moisture and heat. Keep unused effervescent tablets in the foil blister pack. Your doctor will determine how long to treat you with this medicine. Alendronate is often given for only 3 to 5 years. What happens if I miss a dose? Once-daily dosing: If you forget to take alendronate first thing in the morning, do not take it later in the day. Wait until the following morning and skip the missed dose. Do not take two (2) doses in one day. Once-per-week dosing: If you forget to take alendronate on your scheduled day, take it first thing in the morning on the day after you remember the missed dose. Then return to your regular weekly schedule on your chosen dose day. Do not take 2 doses in one day. What happens if I overdose? Drink a full glass of milk and seek emergency medical attention or call the Poison Help line at 1-783.957.4166.  Do not make yourself vomit and do not lie down. What should I avoid while taking alendronate? Avoid taking any other medicines for at least 30 minutes after taking alendronate. This includes vitamins, calcium, and antacids. Some medicines can make it harder for your body to absorb alendronate. Avoid smoking, or try to quit. Smoking can reduce your bone mineral density, making fractures more likely. Avoid drinking large amounts of alcohol. Heavy drinking can also cause bone loss. What are the possible side effects of alendronate? Get emergency medical help if you have signs of an allergic reaction: hives; wheezing, difficulty breathing; swelling of your face, lips, tongue, or throat. Stop using alendronate and call your doctor at once if you have:  · chest pain, new or worsening heartburn;  · difficulty or pain when swallowing;  · pain or burning under the ribs or in the back;  · severe heartburn, burning pain in your upper stomach, or coughing up blood;  · new or unusual pain in your thigh or hip;  · jaw pain, numbness, or swelling;  · severe joint, bone, or muscle pain; or  · low calcium levels --muscle spasms or contractions, numbness or tingly feeling (around your mouth, or in your fingers and toes). Common side effects may include:  · heartburn, upset stomach;  · stomach pain, nausea;  · diarrhea, constipation; or  · bone pain, muscle or joint pain. This is not a complete list of side effects and others may occur. Call your doctor for medical advice about side effects. You may report side effects to FDA at 7-610-FDA-9768. What other drugs will affect alendronate? Tell your doctor about all your other medicines, especially:  · aspirin; or  · NSAIDs (nonsteroidal anti-inflammatory drugs) --ibuprofen (Advil, Motrin), naproxen (Aleve), celecoxib, diclofenac, indomethacin, meloxicam, and others. This list is not complete.  Other drugs may affect alendronate, including prescription and over-the-counter medicines, vitamins, and herbal products. Not all possible drug interactions are listed here. Where can I get more information? Your pharmacist can provide more information about alendronate. Remember, keep this and all other medicines out of the reach of children, never share your medicines with others, and use this medication only for the indication prescribed. Every effort has been made to ensure that the information provided by [address] [place] [address] is accurate, up-to-date, and complete, but no guarantee is made to that effect. Drug information contained herein may be time sensitive. [facility] information has been compiled for use by healthcare practitioners and consumers in the [place] and therefore [facility] does not warrant that uses outside of the [place] are appropriate, unless specifically indicated otherwise. [facility]'s drug information does not endorse drugs, diagnose patients or recommend therapy. [facility]'s drug information is an informational resource designed to assist licensed healthcare practitioners in caring for their patients and/or to serve consumers viewing this service as a supplement to, and not a substitute for, the expertise, skill, knowledge and judgment of healthcare practitioners. The absence of a warning for a given drug or drug combination in no way should be construed to indicate that the drug or drug combination is safe, effective or appropriate for any given patient. [facility] does not assume any responsibility for any aspect of healthcare administered with the aid of information [facility] provides. The information contained herein is not intended to cover all possible uses, directions, precautions, warnings, drug interactions, allergic reactions, or adverse effects. If you have questions about the drugs you are taking, check with your doctor, nurse or pharmacist.  Copyright 7654-5051 Tu [address] [place] [address] Avenue: 15.01. Revision date: 10/19/2020. Care instructions adapted under license by [facility] [address] .

## 2021-08-24 PROBLEM — E11.65 TYPE 2 DIABETES MELLITUS WITH HYPERGLYCEMIA (HCC): Status: ACTIVE | Noted: 2021-08-24

## 2021-08-24 RX ORDER — SITAGLIPTIN AND METFORMIN HYDROCHLORIDE 1000; 50 MG/1; MG/1
1 TABLET, FILM COATED ORAL 2 TIMES DAILY WITH MEALS
Qty: 28 TABLET | Refills: 0 | COMMUNITY
Start: 2021-08-24 | End: 2021-09-14 | Stop reason: SDUPTHER

## 2021-08-25 ASSESSMENT — ENCOUNTER SYMPTOMS
COUGH: 0
NAUSEA: 1
DIARRHEA: 1
ABDOMINAL DISTENTION: 1
VOMITING: 0
SORE THROAT: 0
RHINORRHEA: 0

## 2021-09-02 ENCOUNTER — CARE COORDINATION (OUTPATIENT)
Dept: CARE COORDINATION | Age: 71
End: 2021-09-02

## 2021-09-03 ENCOUNTER — CARE COORDINATION (OUTPATIENT)
Dept: CARE COORDINATION | Age: 71
End: 2021-09-03

## 2021-09-03 NOTE — CARE COORDINATION
I was able to mail out her information for [employer] PAP program for assistance.       Tu December [facility]  [address] [facility] [address]   Medication Assistance  CHAPITO SYLVESTER II[employer]    [phone])[phone]

## 2021-09-13 NOTE — TELEPHONE ENCOUNTER
Pt states she has not recieved the Janumet medication yet and only has medication for tomorrow. Requesting more samples. On Janument 50mg/1,000mg    Please advise if this ok. Also noted in chart that the PAP information was sent for [facility] [id number] [place] [address]

## 2021-09-14 RX ORDER — SITAGLIPTIN AND METFORMIN HYDROCHLORIDE 1000; 50 MG/1; MG/1
1 TABLET, FILM COATED ORAL 2 TIMES DAILY WITH MEALS
Qty: 28 TABLET | Refills: 0 | COMMUNITY
Start: 2021-09-14 | End: 2021-11-01 | Stop reason: DRUGHIGH

## 2021-09-29 ENCOUNTER — CARE COORDINATION (OUTPATIENT)
Dept: CARE COORDINATION | Age: 71
End: 2021-09-29

## 2021-09-29 ENCOUNTER — TELEPHONE (OUTPATIENT)
Dept: PRIMARY CARE CLINIC | Age: 71
End: 2021-09-29

## 2021-09-29 NOTE — TELEPHONE ENCOUNTER
Pt still waiting on PAP program to come through. Pt has about 2 weeks of the Janumet let, will take samples to [facility] for Tommy Christopher in case patient runs out before decision is made. Pt states she sent back in some paperwork about 2 weeks ago regarding insurance, gave info on her traditional medicare but pt is on [address].

## 2021-09-29 NOTE — CARE COORDINATION
I called [employer] and they just shipped her Peggy Dwyer out to her at no charge today. I left Gutierrez Yin a message giving her a heads up its on its way and left her their phone number to call in the future.         [facility]  [address] [facility]   Medication Assistance  CHAP[employer]O SYLVESTER [employer]    [phone])[phone]  (S)[phone]

## 2021-10-20 ENCOUNTER — TELEPHONE (OUTPATIENT)
Dept: PRIMARY CARE CLINIC | Age: 71
End: 2021-10-20

## 2021-10-20 NOTE — TELEPHONE ENCOUNTER
Pt informed. Symptoms consistent with flu - will check CXR to r/o PNA  Start tamiflu twice a day for 5 days   Plenty of rest and hydration - water and gatorade  Alternate ibuprofen and tylenol for fever and body aches   Stay out of school until afebrile for 48 hours   follow up if symptoms worsen or fail to improve

## 2021-10-20 NOTE — TELEPHONE ENCOUNTER
Pt called questioning if you would like labs prior to her November appointment? Health Maintenance   Topic Date Due    DTaP/Tdap/Td vaccine (1 - Tdap) Never done    Shingles Vaccine (2 of 3) 05/26/2014    Diabetic retinal exam  06/23/2021    Flu vaccine (1) 09/01/2021    COVID-19 Vaccine (3 - Pfizer booster) 09/29/2021    Diabetic microalbuminuria test  12/08/2021    Annual Wellness Visit (AWV)  12/08/2021    Diabetic foot exam  03/08/2022    A1C test (Diabetic or Prediabetic)  07/30/2022    Lipid screen  07/30/2022    Potassium monitoring  07/30/2022    Creatinine monitoring  07/30/2022    Colon cancer screen colonoscopy  03/22/2023    Breast cancer screen  08/10/2023    DEXA (modify frequency per FRAX score)  Completed    Pneumococcal 65+ years Vaccine  Completed    Hepatitis C screen  Completed    Hepatitis A vaccine  Aged Out    Hib vaccine  Aged Out    Meningococcal (ACWY) vaccine  Aged Out             (applicable per patient's age: Cancer Screenings, Depression Screening, Fall Risk Screening, Immunizations)    Hemoglobin A1C (%)   Date Value   07/30/2021 8.0 (H)   03/08/2021 7.0   10/14/2020 6.4     Microalb/Crt.  Ratio (mcg/mg creat)   Date Value   12/08/2020 CANNOT BE CALCULATED     LDL Cholesterol (mg/dL)   Date Value   07/30/2021 84     AST (U/L)   Date Value   07/30/2021 14     ALT (U/L)   Date Value   07/30/2021 17     BUN (mg/dL)   Date Value   07/30/2021 14      (goal A1C is < 7)   (goal LDL is <100) need 30-50% reduction from baseline     BP Readings from Last 3 Encounters:   08/23/21 130/80   08/02/21 138/82   03/22/21 (!) 171/88    (goal /80)      All Future Testing planned in [facility]:  Lab Frequency Next Occurrence       Next Visit Date:  Future Appointments   Date Time Provider Soha Rodriguez   11/1/2021 10:40 AM [facility]            Patient Active Problem List:     Type 2 diabetes, HbA1c goal < 7% ([address]     Pure hypercholesterolemia     Essential hypertension, benign     Senile osteoporosis     Anxiety state     Dermatophytosis of foot     Type 2 diabetes mellitus with hyperglycemia

## 2021-11-01 ENCOUNTER — OFFICE VISIT (OUTPATIENT)
Dept: FAMILY MEDICINE CLINIC | Age: 71
End: 2021-11-01
Payer: MEDICARE

## 2021-11-01 VITALS
SYSTOLIC BLOOD PRESSURE: 144 MMHG | BODY MASS INDEX: 32.78 KG/M2 | DIASTOLIC BLOOD PRESSURE: 78 MMHG | HEIGHT: 63 IN | HEART RATE: 86 BPM | OXYGEN SATURATION: 99 % | WEIGHT: 185 LBS

## 2021-11-01 DIAGNOSIS — I10 ESSENTIAL HYPERTENSION, BENIGN: ICD-10-CM

## 2021-11-01 DIAGNOSIS — F32.A ANXIETY AND DEPRESSION: ICD-10-CM

## 2021-11-01 DIAGNOSIS — E66.1 CLASS 2 DRUG-INDUCED OBESITY WITHOUT SERIOUS COMORBIDITY WITH BODY MASS INDEX (BMI) OF 36.0 TO 36.9 IN ADULT: ICD-10-CM

## 2021-11-01 DIAGNOSIS — E11.9 CONTROLLED TYPE 2 DIABETES MELLITUS WITHOUT COMPLICATION, WITHOUT LONG-TERM CURRENT USE OF INSULIN (HCC): Primary | ICD-10-CM

## 2021-11-01 DIAGNOSIS — E78.00 PURE HYPERCHOLESTEROLEMIA: ICD-10-CM

## 2021-11-01 DIAGNOSIS — F41.9 ANXIETY AND DEPRESSION: ICD-10-CM

## 2021-11-01 LAB — HBA1C MFR BLD: 6 %

## 2021-11-01 PROCEDURE — 3017F COLORECTAL CA SCREEN DOC REV: CPT | Performed by: NURSE PRACTITIONER

## 2021-11-01 PROCEDURE — 83036 HEMOGLOBIN GLYCOSYLATED A1C: CPT | Performed by: NURSE PRACTITIONER

## 2021-11-01 PROCEDURE — 1123F ACP DISCUSS/DSCN MKR DOCD: CPT | Performed by: NURSE PRACTITIONER

## 2021-11-01 PROCEDURE — 1090F PRES/ABSN URINE INCON ASSESS: CPT | Performed by: NURSE PRACTITIONER

## 2021-11-01 PROCEDURE — 1036F TOBACCO NON-USER: CPT | Performed by: NURSE PRACTITIONER

## 2021-11-01 PROCEDURE — 4040F PNEUMOC VAC/ADMIN/RCVD: CPT | Performed by: NURSE PRACTITIONER

## 2021-11-01 PROCEDURE — 99214 OFFICE O/P EST MOD 30 MIN: CPT | Performed by: NURSE PRACTITIONER

## 2021-11-01 PROCEDURE — G8484 FLU IMMUNIZE NO ADMIN: HCPCS | Performed by: NURSE PRACTITIONER

## 2021-11-01 PROCEDURE — G8399 PT W/DXA RESULTS DOCUMENT: HCPCS | Performed by: NURSE PRACTITIONER

## 2021-11-01 PROCEDURE — 2022F DILAT RTA XM EVC RTNOPTHY: CPT | Performed by: NURSE PRACTITIONER

## 2021-11-01 PROCEDURE — G8427 DOCREV CUR MEDS BY ELIG CLIN: HCPCS | Performed by: NURSE PRACTITIONER

## 2021-11-01 PROCEDURE — G8417 CALC BMI ABV UP PARAM F/U: HCPCS | Performed by: NURSE PRACTITIONER

## 2021-11-01 PROCEDURE — 3044F HG A1C LEVEL LT 7.0%: CPT | Performed by: NURSE PRACTITIONER

## 2021-11-01 RX ORDER — SITAGLIPTIN AND METFORMIN HYDROCHLORIDE 1000; 50 MG/1; MG/1
1 TABLET, FILM COATED ORAL
Qty: 28 TABLET | Refills: 0 | Status: SHIPPED
Start: 2021-11-01 | End: 2022-03-23 | Stop reason: SDUPTHER

## 2021-11-01 RX ORDER — ATORVASTATIN CALCIUM 20 MG/1
20 TABLET, FILM COATED ORAL DAILY
Qty: 90 TABLET | Refills: 1 | Status: SHIPPED | OUTPATIENT
Start: 2021-11-01 | End: 2022-05-03 | Stop reason: SDUPTHER

## 2021-11-01 RX ORDER — VENLAFAXINE HYDROCHLORIDE 75 MG/1
75 CAPSULE, EXTENDED RELEASE ORAL DAILY
Qty: 90 CAPSULE | Refills: 1 | Status: SHIPPED | OUTPATIENT
Start: 2021-11-01 | End: 2022-05-03 | Stop reason: SDUPTHER

## 2021-11-01 RX ORDER — CLINDAMYCIN HYDROCHLORIDE 300 MG/1
600 CAPSULE ORAL ONCE
Qty: 2 CAPSULE | Refills: 1 | Status: SHIPPED | OUTPATIENT
Start: 2021-11-01 | End: 2021-11-01

## 2021-11-01 NOTE — PATIENT INSTRUCTIONS
You may be receiving a survey from [employer] regarding your visit today. You may get this in the mail, through your [employer] [place] or in your email. Please complete the survey to enable us to provide the highest quality of care to you and your family. If you cannot score us as very good ( 5 Stars) on any question, please feel free to call the office to discuss how we could have made your experience exceptional.     Thank You! JEREMÍAS Moreno  [address] [place], [place]    Phone: [phone]  Fax: [phone] [facility] Office Hours:  Monday: [facility] office location 8-5 ([phone]) Offering additional late hours the first Monday of the month until 7 pm.   Tuesday: 8-5 Wednesday: 8-5 Thursday:  Additional hours offered 2 Thursdays a month. Please call to inquire those dates. Fridays: 7:30-4:30        You may be receiving a survey from [employer] regarding your visit today. You may get this in the mail, through your [employer] or in your email. Please complete the survey to enable us to provide the highest quality of care to you and your family. If you cannot score us as very good ( 5 Stars) on any question, please feel free to call the office to discuss how we could have made your experience exceptional.     Thank You! JEREMÍAS Moreno  [address] [place], [place]    Phone: [phone]  Fax: [phone] [facility] Office Hours:  Monday: [facility] office location 8-5 ([phone]) Offering additional late hours the first Monday of the month until 7 pm.   Tuesday: 8-5 Wednesday: 8-5 Thursday:  Additional hours offered 2 Thursdays a month. Please call to inquire those dates.    Fridays: 7:30-4:30

## 2021-11-01 NOTE — PROGRESS NOTES
[facility]  [facility] [address] [address]  Dept: [phone]  Dept Fax: [phone]    Last encounter 8/23/2021    Check-Up       HPI:   Renee Lopez is a 80 y.o. female who presentstoday for her medical conditions/complaints as noted below. Renee Lopez is c/o of Check-Up      HPI  Established patient    80year old female here today for type 2 DM follow up doing much better. Currently on Janumet. Using pt assistance. Pt still confused by amount of CHO counting and meals. Lab Results   Component Value Date    LABA1C 6.0 11/01/2021     Lab Results   Component Value Date     07/30/2021     Wt Readings from Last 3 Encounters:   11/01/21 185 lb (83.9 kg)   08/23/21 193 lb (87.5 kg)   08/02/21 200 lb (90.7 kg)   15 pound weight loss discussed need to have calories with meals pt with rapid wt loss must be not taking in enough calories. Pt with recent tooth decay and issues seeing dentist for work soon. Plan to have biphosphonate in future for osteopenia. C/o teeth breaking easily (discussed poor nutrition contributing with rapid weight loss)     Treatment Adherence:   Medication compliance:  compliant all of the time  Diet compliance:  compliant all of the time  Weight trend: decreasing  Current exercise: no regular exercise  Barriers: none    Diabetes Mellitus Type 2: Current symptoms/problems include none. Home blood sugar records: fasting range: 105  Any episodes of hypoglycemia? No reviewed s/s low sugar   Eye exam current (within one year): yes  Tobacco history: She  reports that she has never smoked. She has never used smokeless tobacco.   Daily Aspirin? No:     Hypertension:  Home blood pressure monitoring: No.  She is adherent to a low sodium diet. Patient denies chest pain, shortness of breath, headache, lightheadedness, blurred vision and peripheral edema. Antihypertensive medication side effects: no medication side effects noted.   Use of agents associated with hypertension: none. Hyperlipidemia:  No new myalgias or GI upset on atorvastatin (Lipitor).        Lab Results   Component Value Date    LABA1C 6.0 2021    LABA1C 8.0 (H) 2021    LABA1C 7.0 2021     Lab Results   Component Value Date    LABMICR CANNOT BE CALCULATED 2020    CREATININE 0.69 2021     Lab Results   Component Value Date    ALT 17 2021    AST 14 2021     Lab Results   Component Value Date    CHOL 164 2021    TRIG 214 (H) 2021    HDL 37 (L) 2021          Reviewed prior notes   Reviewed previous Labs, Imaging and Hospital Records    Past Medical History:   Diagnosis Date    Anxiety     Hyperlipidemia     Hypertension     onset early 70'E    Lichen simplex 9033    shoulders and right leg    Osteopenia 2011    femoral necks jean -1.9, -1.8      Past Surgical History:   Procedure Laterality Date    [facility]      [facility] 35   [facility]      colon resection bowel blockage/adhesions-gangrene    COLONOSCOPY  2016    Mikey GORDON    COLONOSCOPY N/A 2021    COLONOSCOPY  repeat in 2 years , result polypectomy [facility]      x3 pulled    HYSTERECTOMY      kept ovaries       Family History   Problem Relation Age of Onset    Hypertension Mother     Dementia Mother     Hypertension Father     Heart Disease Father         stroke during heart surgery     High Cholesterol Father     Heart Disease Brother         cath with stents    Heart Attack Maternal Grandmother     Other Paternal Grandmother         cerbral aneurysm    Alcohol Abuse Brother     Cancer Brother         kidney       Social History     Tobacco Use    Smoking status: Never Smoker    Smokeless tobacco: Never Used    Tobacco comment: hx smoking-6 years 1/2 ppd or less   Substance Use Topics    Alcohol use: No      Current Outpatient Medications   Medication Sig Dispense Refill    sitaGLIPtan-metFORMIN (JANUMET)  MG per tablet Take 1 tablet by mouth daily (with breakfast) 28 tablet 0    atorvastatin (LIPITOR) 20 MG tablet Take 1 tablet by mouth daily 90 tablet 1    venlafaxine (EFFEXOR XR) 75 MG extended release capsule Take 1 capsule by mouth daily 90 capsule 1    Calcium Carbonate-Vitamin D (CALCIUM PLUS VITAMIN D) 500-50 MG-UNIT CAPS Take 1 capsule by mouth daily 60 capsule     Vitamin D, Cholecalciferol, 50 MCG (2000 UT) CAPS Take 2,000 Units by mouth daily For vitamin D deficiency 30 capsule 2    blood glucose test strips (ASCENSIA AUTODISC VI;ONE TOUCH ULTRA TEST VI) strip Test sugar Bid and as needed Dx E 11.9 100 each 3    Lancets MISC Testing Blood sugar once daily and as needed. 100 each 1    TGT PSYLLIUM FIBER PO Take 2 capsules by mouth daily      losartan (COZAAR) 100 MG tablet Take 1 tablet by mouth daily For hypertension/kidney protection with diabetes 90 tablet 1    amLODIPine (NORVASC) 10 MG tablet Take 1 tablet by mouth daily 90 tablet 1    aspirin 81 MG tablet Take 81 mg by mouth daily      hydrocortisone (CORTEF) 10 MG tablet        No current facility-administered medications for this visit.      Allergies   Allergen Reactions    Penicillins Hives    Dust Mite Extract Other (See Comments)     headache       Health Maintenance   Topic Date Due    DTaP/Tdap/Td vaccine (1 - Tdap) Never done    Shingles Vaccine (2 of 3) 05/26/2014    Diabetic retinal exam  06/23/2021    Diabetic microalbuminuria test  12/08/2021    Annual Wellness Visit (AWV)  12/08/2021    Diabetic foot exam  03/08/2022    Lipid screen  07/30/2022    Potassium monitoring  07/30/2022    Creatinine monitoring  07/30/2022    A1C test (Diabetic or Prediabetic)  11/01/2022    Colon cancer screen colonoscopy  03/22/2023    Breast cancer screen  08/10/2023    DEXA (modify frequency per FRAX score)  Completed    Flu vaccine  Completed    Pneumococcal 65+ years Vaccine  Completed    COVID-19 Vaccine  Completed    Hepatitis C screen  Completed    Hepatitis A vaccine  Aged Out    Hib vaccine  Aged Out    Meningococcal (ACWY) vaccine  Aged Out       Subjective:      Review of Systems   Constitutional: Negative for activity change, chills and fever. HENT: Negative for congestion, rhinorrhea and sore throat. Eyes: Negative. Respiratory: Negative for cough, chest tightness and shortness of breath. Cardiovascular: Negative for chest pain. Gastrointestinal: Negative for abdominal pain and nausea. Endocrine: Negative for cold intolerance and heat intolerance. Genitourinary: Negative for difficulty urinating. Musculoskeletal: Negative for arthralgias and gait problem. Skin: Negative for rash. Neurological: Negative for dizziness and headaches. Hematological: Negative for adenopathy. Psychiatric/Behavioral: Negative for sleep disturbance. The patient is nervous/anxious. Objective:     Physical Exam  Vitals and nursing note reviewed. Constitutional:       General: She is not in acute distress. Appearance: Normal appearance. She is well-developed. HENT:      Head: Normocephalic and atraumatic. Right Ear: Tympanic membrane and external ear normal.      Left Ear: Tympanic membrane and external ear normal.      Nose: No congestion. Mouth/Throat:      Mouth: Mucous membranes are moist.   Eyes:      General: No scleral icterus. Pupils: Pupils are equal, round, and reactive to light. Neck:      Vascular: No carotid bruit (neg jean). Cardiovascular:      Rate and Rhythm: Normal rate and regular rhythm. Heart sounds: Normal heart sounds. No murmur heard. Pulmonary:      Effort: Pulmonary effort is normal. No respiratory distress. Breath sounds: Normal breath sounds. No wheezing. Abdominal:      Palpations: Abdomen is soft. Tenderness: There is no abdominal tenderness.    Musculoskeletal:         General: Normal range of motion. Cervical back: Normal range of motion and neck supple. Right lower leg: No edema. Left lower leg: No edema. Lymphadenopathy:      Cervical: No cervical adenopathy. Skin:     General: Skin is warm and dry. Findings: No rash. Neurological:      Mental Status: She is alert and oriented to person, place, and time. Psychiatric:         Behavior: Behavior normal.         Thought Content: Thought content normal.         Judgment: Judgment normal.       BP (!) 144/78 (Site: Right Upper Arm)   Pulse 86   Ht 5' 3\" (1.6 m)   Wt 185 lb (83.9 kg)   LMP  (LMP Unknown) Comment: partial  SpO2 99%   BMI 32.77 kg/m²     Data:     Lab Results   Component Value Date     07/30/2021    K 4.1 07/30/2021     07/30/2021    CO2 23 07/30/2021    BUN 14 07/30/2021    CREATININE 0.69 07/30/2021    GLUCOSE 182 07/30/2021    GLUCOSE 179 04/12/2012    PROT 7.6 07/30/2021    LABALBU 4.0 07/30/2021    LABALBU 4.0 04/12/2012    BILITOT 0.30 07/30/2021    ALKPHOS 127 07/30/2021    AST 14 07/30/2021    ALT 17 07/30/2021     Lab Results   Component Value Date    WBC 14.1 07/30/2021    RBC 5.23 07/30/2021    HGB 12.9 07/30/2021    HCT 38.9 07/30/2021    MCV 74.4 07/30/2021    MCH 24.6 07/30/2021    MCHC 33.1 07/30/2021    RDW 15.6 07/30/2021     07/30/2021    MPV NOT REPORTED 07/30/2021     Lab Results   Component Value Date    TSH 3.33 07/30/2021     Lab Results   Component Value Date    CHOL 164 07/30/2021    HDL 37 07/30/2021    LABA1C 6.0 11/01/2021          Assessment & Plan       1. Controlled type 2 diabetes mellitus without complication, without long-term current use of insulin (HCC)  Decrease med with weight loss and limited appetite     - POCT glycosylated hemoglobin (Hb A1C)    2. Pure hypercholesterolemia  Stable continue statin   - atorvastatin (LIPITOR) 20 MG tablet; Take 1 tablet by mouth daily  Dispense: 90 tablet; Refill: 1    3.  Anxiety and depression  Stable   - adherence  Reviewed prior labs and health maintenance. Continue current medications, diet and exercise. Discussed use, benefit, and side effects of prescribed medications. Barriers to medication compliance addressed. Patient given educational materials - see patient instructions. All patient questions answered. Patient voiced understanding. On this date 11/1/2021 I have spent 31 minutes reviewing previous notes, test results and face to face with the patient discussing the diagnosis and importance of compliance with the treatment plan as well as documenting on the day of the visit.      Electronically signed by DANA Ball CNP on 11/3/2021 at 8:37 PM

## 2021-11-03 RX ORDER — HYDROCORTISONE 10 MG/1
TABLET ORAL
COMMUNITY
Start: 2021-09-08 | End: 2022-02-07 | Stop reason: ALTCHOICE

## 2021-11-03 ASSESSMENT — ENCOUNTER SYMPTOMS
NAUSEA: 0
ABDOMINAL PAIN: 0
EYES NEGATIVE: 1
SORE THROAT: 0
CHEST TIGHTNESS: 0
SHORTNESS OF BREATH: 0
RHINORRHEA: 0
COUGH: 0

## 2021-12-30 DIAGNOSIS — I10 ESSENTIAL HYPERTENSION, BENIGN: Primary | ICD-10-CM

## 2021-12-30 RX ORDER — AMLODIPINE BESYLATE 10 MG/1
10 TABLET ORAL DAILY
Qty: 90 TABLET | Refills: 1 | Status: SHIPPED | OUTPATIENT
Start: 2021-12-30 | End: 2022-05-03 | Stop reason: SDUPTHER

## 2022-01-26 ENCOUNTER — TELEPHONE (OUTPATIENT)
Dept: FAMILY MEDICINE CLINIC | Age: 72
End: 2022-01-26

## 2022-01-26 DIAGNOSIS — E78.00 PURE HYPERCHOLESTEROLEMIA: ICD-10-CM

## 2022-01-26 DIAGNOSIS — I10 ESSENTIAL HYPERTENSION, BENIGN: Primary | ICD-10-CM

## 2022-01-26 DIAGNOSIS — Z13.29 SCREENING FOR THYROID DISORDER: ICD-10-CM

## 2022-01-26 DIAGNOSIS — E11.9 CONTROLLED TYPE 2 DIABETES MELLITUS WITHOUT COMPLICATION, WITHOUT LONG-TERM CURRENT USE OF INSULIN (HCC): ICD-10-CM

## 2022-01-26 NOTE — TELEPHONE ENCOUNTER
----- Message from Samreen Songirmer sent at 1/26/2022 10:19 AM EST -----  Subject: Message to Provider    QUESTIONS  Information for Provider? Patient has appt feb 7th for three month check   and needs to know if needs blood work before appt? cb pt  ---------------------------------------------------------------------------  --------------  [address]  What is the best way for the office to contact you? OK to leave message on   voicemail  Preferred Call Back Phone Number? [phone]  ---------------------------------------------------------------------------  --------------  SCRIPT ANSWERS  Relationship to Patient?  Self

## 2022-01-26 NOTE — TELEPHONE ENCOUNTER
Inform patient yes labs are due prior to visit need to be done fasting and also annual urine to check for protein is due too. Must be done AFTER 2/1/22 for insurance to cover.      Thanks

## 2022-02-02 ENCOUNTER — HOSPITAL ENCOUNTER (OUTPATIENT)
Age: 72
Discharge: HOME | End: 2022-02-02
Payer: MEDICARE

## 2022-02-02 DIAGNOSIS — E78.00 PURE HYPERCHOLESTEROLEMIA: ICD-10-CM

## 2022-02-02 DIAGNOSIS — E11.9 CONTROLLED TYPE 2 DIABETES MELLITUS WITHOUT COMPLICATION, WITHOUT LONG-TERM CURRENT USE OF INSULIN (HCC): ICD-10-CM

## 2022-02-02 LAB
ALBUMIN SERPL-MCNC: 3.5 G/DL (ref 3.5–5.2)
ALBUMIN/GLOBULIN RATIO: ABNORMAL (ref 1–2.5)
ALP BLD-CCNC: 118 U/L (ref 35–104)
ALT SERPL-CCNC: 13 U/L (ref 5–33)
ANION GAP SERPL CALCULATED.3IONS-SCNC: 11 MMOL/L (ref 9–17)
AST SERPL-CCNC: 12 U/L
BILIRUB SERPL-MCNC: 0.26 MG/DL (ref 0.3–1.2)
BUN BLDV-MCNC: 18 MG/DL (ref 8–23)
BUN/CREAT BLD: 26 (ref 9–20)
CALCIUM SERPL-MCNC: 9 MG/DL (ref 8.6–10.4)
CHLORIDE BLD-SCNC: 108 MMOL/L (ref 98–107)
CHOLESTEROL/HDL RATIO: 3.5
CHOLESTEROL: 137 MG/DL
CO2: 20 MMOL/L (ref 20–31)
CREAT SERPL-MCNC: 0.68 MG/DL (ref 0.5–0.9)
CREATININE URINE: 212.7 MG/DL (ref 28–217)
ESTIMATED AVERAGE GLUCOSE: 137 MG/DL
GFR AFRICAN AMERICAN: >60 ML/MIN
GFR NON-AFRICAN AMERICAN: >60 ML/MIN
GFR SERPL CREATININE-BSD FRML MDRD: ABNORMAL ML/MIN/{1.73_M2}
GFR SERPL CREATININE-BSD FRML MDRD: ABNORMAL ML/MIN/{1.73_M2}
GLUCOSE BLD-MCNC: 136 MG/DL (ref 70–99)
HBA1C MFR BLD: 6.4 % (ref 4–6)
HDLC SERPL-MCNC: 39 MG/DL
LDL CHOLESTEROL: 75 MG/DL (ref 0–130)
MICROALBUMIN/CREAT 24H UR: 12 MG/L
MICROALBUMIN/CREAT UR-RTO: 6 MCG/MG CREAT
PATIENT FASTING?: YES
POTASSIUM SERPL-SCNC: 4.1 MMOL/L (ref 3.7–5.3)
SODIUM BLD-SCNC: 139 MMOL/L (ref 135–144)
TOTAL PROTEIN: 7.1 G/DL (ref 6.4–8.3)
TRIGL SERPL-MCNC: 114 MG/DL
VLDLC SERPL CALC-MCNC: ABNORMAL MG/DL (ref 1–30)

## 2022-02-02 PROCEDURE — 80061 LIPID PANEL: CPT

## 2022-02-02 PROCEDURE — 83036 HEMOGLOBIN GLYCOSYLATED A1C: CPT

## 2022-02-02 PROCEDURE — 80053 COMPREHEN METABOLIC PANEL: CPT

## 2022-02-02 PROCEDURE — 36415 COLL VENOUS BLD VENIPUNCTURE: CPT

## 2022-02-02 PROCEDURE — 82043 UR ALBUMIN QUANTITATIVE: CPT

## 2022-02-02 PROCEDURE — 82570 ASSAY OF URINE CREATININE: CPT

## 2022-02-07 ENCOUNTER — OFFICE VISIT (OUTPATIENT)
Dept: FAMILY MEDICINE CLINIC | Age: 72
End: 2022-02-07
Payer: MEDICARE

## 2022-02-07 VITALS
DIASTOLIC BLOOD PRESSURE: 82 MMHG | SYSTOLIC BLOOD PRESSURE: 148 MMHG | BODY MASS INDEX: 32.24 KG/M2 | WEIGHT: 182 LBS | OXYGEN SATURATION: 98 % | HEART RATE: 85 BPM

## 2022-02-07 DIAGNOSIS — M85.851 OSTEOPENIA OF BOTH HIPS: ICD-10-CM

## 2022-02-07 DIAGNOSIS — M85.852 OSTEOPENIA OF BOTH HIPS: ICD-10-CM

## 2022-02-07 DIAGNOSIS — I10 ESSENTIAL HYPERTENSION, BENIGN: ICD-10-CM

## 2022-02-07 DIAGNOSIS — E78.2 MIXED HYPERLIPIDEMIA: ICD-10-CM

## 2022-02-07 DIAGNOSIS — Z23 NEED FOR DIPHTHERIA-TETANUS-PERTUSSIS (TDAP) VACCINE: ICD-10-CM

## 2022-02-07 DIAGNOSIS — E08.8 DIABETES MELLITUS DUE TO UNDERLYING CONDITION WITH COMPLICATION, WITHOUT LONG-TERM CURRENT USE OF INSULIN (HCC): Primary | ICD-10-CM

## 2022-02-07 DIAGNOSIS — F32.A ANXIETY AND DEPRESSION: ICD-10-CM

## 2022-02-07 DIAGNOSIS — F41.9 ANXIETY AND DEPRESSION: ICD-10-CM

## 2022-02-07 PROCEDURE — 4040F PNEUMOC VAC/ADMIN/RCVD: CPT | Performed by: NURSE PRACTITIONER

## 2022-02-07 PROCEDURE — 1090F PRES/ABSN URINE INCON ASSESS: CPT | Performed by: NURSE PRACTITIONER

## 2022-02-07 PROCEDURE — 1123F ACP DISCUSS/DSCN MKR DOCD: CPT | Performed by: NURSE PRACTITIONER

## 2022-02-07 PROCEDURE — 1036F TOBACCO NON-USER: CPT | Performed by: NURSE PRACTITIONER

## 2022-02-07 PROCEDURE — G8417 CALC BMI ABV UP PARAM F/U: HCPCS | Performed by: NURSE PRACTITIONER

## 2022-02-07 PROCEDURE — 3017F COLORECTAL CA SCREEN DOC REV: CPT | Performed by: NURSE PRACTITIONER

## 2022-02-07 PROCEDURE — G8399 PT W/DXA RESULTS DOCUMENT: HCPCS | Performed by: NURSE PRACTITIONER

## 2022-02-07 PROCEDURE — G8427 DOCREV CUR MEDS BY ELIG CLIN: HCPCS | Performed by: NURSE PRACTITIONER

## 2022-02-07 PROCEDURE — 99213 OFFICE O/P EST LOW 20 MIN: CPT | Performed by: NURSE PRACTITIONER

## 2022-02-07 PROCEDURE — G8484 FLU IMMUNIZE NO ADMIN: HCPCS | Performed by: NURSE PRACTITIONER

## 2022-02-07 RX ORDER — LOSARTAN POTASSIUM 100 MG/1
100 TABLET ORAL DAILY
Qty: 90 TABLET | Refills: 1 | Status: SHIPPED | OUTPATIENT
Start: 2022-02-07 | End: 2022-05-12

## 2022-02-07 RX ORDER — CLINDAMYCIN HYDROCHLORIDE 300 MG/1
CAPSULE ORAL
COMMUNITY
Start: 2021-11-04

## 2022-02-07 NOTE — PATIENT INSTRUCTIONS
[phone] call about your janumet refill     [address]     [phone]  22  insurance has a HIGH deductible which makes you pay for all of your medication until you have spent $4100 . Please consider checking other plans when the OPEN ENROLLMENT period comes in October yearly. insurance company said these are estimate costs. Patient does have $4100 deductible for meds and still has $3800 to meet her deductible. I do not see her every meeting this medication deductible. It seems she picked a low cost premium high deductible med plan. Call and schedule EYE appt Dr. Jayant Severino for early Summer. Learning About Meal Planning for Diabetes  Why plan your meals? Meal planning can be a key part of managing diabetes. Planning meals and snacks with the right balance of carbohydrate, protein, and fat can help you keep your blood sugar at the target level you set with your doctor. You don't have to eat special foods. You can eat what your family eats, including sweets once in a while. But you do have to pay attention to how often you eat and how much you eat of certain foods. You may want to work with a dietitian or a certified diabetes educator. He or she can give you tips and meal ideas and can answer your questions about meal planning. This health professional can also help you reach a healthy weight if that is one of your goals. What plan is right for you? Your dietitian or diabetes educator may suggest that you start with the plate format or carbohydrate counting. The plate format  The plate format is a simple way to help you manage how you eat. You plan meals by learning how much space each food should take on a plate. Using the plate format helps you spread carbohydrate throughout the day. It can make it easier to keep your blood sugar level within your target range.  It also helps you see if you're eating healthy portion sizes. To use the plate format, you put non-starchy vegetables on half your plate. Add meat or meat substitutes on one-quarter of the plate. Put a grain or starchy vegetable (such as brown rice or a potato) on the final quarter of the plate. You can add a small piece of fruit and some low-fat or fat-free milk or yogurt, depending on your carbohydrate goal for each meal.  Here are some tips for using the plate format:  · Make sure that you are not using an oversized plate. A 9-inch plate is best. Many restaurants use larger plates. · Get used to using the plate format at home. Then you can use it when you eat out. · Write down your questions about using the plate format. Talk to your doctor, a dietitian, or a diabetes educator about your concerns. Carbohydrate counting  With carbohydrate counting, you plan meals based on the amount of carbohydrate in each food. Carbohydrate raises blood sugar higher and more quickly than any other nutrient. It is found in desserts, breads and cereals, and fruit. It's also found in starchy vegetables such as potatoes and corn, grains such as rice and pasta, and milk and yogurt. Spreading carbohydrate throughout the day helps keep your blood sugar levels within your target range. Your daily amount depends on several things, including your weight, how active you are, which diabetes medicines you take, and what your goals are for your blood sugar levels. A registered dietitian or diabetes educator can help you plan how much carbohydrate to include in each meal and snack. A guideline for your daily amount of carbohydrate is:  · 45 to 60 grams at each meal. That's about the same as 3 to 4 carbohydrate servings. · 15 to 20 grams at each snack. That's about the same as 1 carbohydrate serving. The Nutrition Facts label on packaged foods tells you how much carbohydrate is in a serving of the food.  First, look at the serving size on the food label. Is that the amount you eat in a serving? All of the nutrition information on a food label is based on that serving size. So if you eat more or less than that, you'll need to adjust the other numbers. Total carbohydrate is the next thing you need to look for on the label. If you count carbohydrate servings, one serving of carbohydrate is 15 grams. For foods that don't come with labels, such as fresh fruits and vegetables, you'll need a guide that lists carbohydrate in these foods. Ask your doctor, dietitian, or diabetes educator about books or other nutrition guides you can use. If you take insulin, you need to know how many grams of carbohydrate are in a meal. This lets you know how much rapid-acting insulin to take before you eat. If you use an insulin pump, you get a constant rate of insulin during the day. So the pump must be programmed at meals to give you extra insulin to cover the rise in blood sugar after meals. When you know how much carbohydrate you will eat, you can take the right amount of insulin. Or, if you always use the same amount of insulin, you need to make sure that you eat the same amount of carbohydrate at meals. If you need more help to understand carbohydrate counting and food labels, ask your doctor, dietitian, or diabetes educator. How do you get started with meal planning? Here are some tips to get started:  · Plan your meals a week at a time. Don't forget to include snacks too. · Use cookbooks or online recipes to plan several main meals. Plan some quick meals for busy nights. You also can double some recipes that freeze well. Then you can save half for other busy nights when you don't have time to cook. · Make sure you have the ingredients you need for your recipes. If you're running low on basic items, put these items on your shopping list too. · List foods that you use to make breakfasts, lunches, and snacks. List plenty of fruits and vegetables.   · Post this list on the refrigerator. Add to it as you think of more things you need. · Take the list to the store to do your weekly shopping. Follow-up care is a key part of your treatment and safety. Be sure to make and go to all appointments, and call your doctor if you are having problems. It's also a good idea to know your test results and keep a list of the medicines you take. Where can you learn more? Go to https://chpepiceweb.[employer]. org and sign in to your [employer] account. Enter U259 in the blinkbox box to learn more about \"Learning About Meal Planning for Diabetes. \"     If you do not have an account, please click on the \"Sign Up Now\" link. Current as of: December 20, 2019               Content Version: 12.6  © 3072-1074 [employer]. Care instructions adapted under license by [facility] ([facility]). If you have questions about a medical condition or this instruction, always ask your healthcare professional. [address] any warranty or liability for your use of this information. Learning About Diabetes Food Guidelines  Your Care Instructions     Meal planning is important to manage diabetes. It helps keep your blood sugar at a target level (which you set with your doctor). You don't have to eat special foods. You can eat what your family eats, including sweets once in a while. But you do have to pay attention to how often you eat and how much you eat of certain foods. You may want to work with a dietitian or a certified diabetes educator (CDE) to help you plan meals and snacks. A dietitian or CDE can also help you lose weight if that is one of your goals. What should you know about eating carbs? Managing the amount of carbohydrate (carbs) you eat is an important part of healthy meals when you have diabetes. Carbohydrate is found in many foods. · Learn which foods have carbs. And learn the amounts of carbs in different foods.   ? Bread, cereal, pasta, and rice have about 15 grams of carbs in a serving. A serving is 1 slice of bread (1 ounce), ½ cup of cooked cereal, or 1/3 cup of cooked pasta or rice. ? Fruits have 15 grams of carbs in a serving. A serving is 1 small fresh fruit, such as an apple or orange; ½ of a banana; ½ cup of cooked or canned fruit; ½ cup of fruit juice; 1 cup of melon or raspberries; or 2 tablespoons of dried fruit. ? Milk and no-sugar-added yogurt have 15 grams of carbs in a serving. A serving is 1 cup of milk or 2/3 cup of no-sugar-added yogurt. ? Starchy vegetables have 15 grams of carbs in a serving. A serving is ½ cup of mashed potatoes or sweet potato; 1 cup winter squash; ½ of a small baked potato; ½ cup of cooked beans; or ½ cup cooked corn or green peas. · Learn how much carbs to eat each day and at each meal. A dietitian or CDE can teach you how to keep track of the amount of carbs you eat. This is called carbohydrate counting. · If you are not sure how to count carbohydrate grams, use the Plate Method to plan meals. It is a good, quick way to make sure that you have a balanced meal. It also helps you spread carbs throughout the day. ? Divide your plate by types of foods. Put non-starchy vegetables on half the plate, meat or other protein food on one-quarter of the plate, and a grain or starchy vegetable in the final quarter of the plate. To this you can add a small piece of fruit and 1 cup of milk or yogurt, depending on how many carbs you are supposed to eat at a meal.  · Try to eat about the same amount of carbs at each meal. Do not \"save up\" your daily allowance of carbs to eat at one meal.  · Proteins have very little or no carbs per serving. Examples of proteins are beef, chicken, turkey, fish, eggs, tofu, cheese, cottage cheese, and peanut butter. A serving size of meat is 3 ounces, which is about the size of a deck of cards.  Examples of meat substitute serving sizes (equal to 1 ounce of meat) are 1/4 cup of cottage cheese, 1 egg, 1 tablespoon of peanut butter, and ½ cup of tofu. How can you eat out and still eat healthy? · Learn to estimate the serving sizes of foods that have carbohydrate. If you measure food at home, it will be easier to estimate the amount in a serving of restaurant food. · If the meal you order has too much carbohydrate (such as potatoes, corn, or baked beans), ask to have a low-carbohydrate food instead. Ask for a salad or green vegetables. · If you use insulin, check your blood sugar before and after eating out to help you plan how much to eat in the future. · If you eat more carbohydrate at a meal than you had planned, take a walk or do other exercise. This will help lower your blood sugar. What else should you know? · Limit saturated fat, such as the fat from meat and dairy products. This is a healthy choice because people who have diabetes are at higher risk of heart disease. So choose lean cuts of meat and nonfat or low-fat dairy products. Use olive or canola oil instead of butter or shortening when cooking. · Don't skip meals. Your blood sugar may drop too low if you skip meals and take insulin or certain medicines for diabetes. · Check with your doctor before you drink alcohol. Alcohol can cause your blood sugar to drop too low. Alcohol can also cause a bad reaction if you take certain diabetes medicines. Follow-up care is a key part of your treatment and safety. Be sure to make and go to all appointments, and call your doctor if you are having problems. It's also a good idea to know your test results and keep a list of the medicines you take. Where can you learn more? Go to https://jordin.Quantifind. org and sign in to your [email or web address] account. Enter D319 in the [facility] box to learn more about \"Learning About Diabetes Food Guidelines. \"     If you do not have an account, please click on the \"Sign Up Now\" link.   Current as of: December 20, 2019               Content Version: right for you. If your blood pressure is high, your treatment may also include medicine. Changes in your lifestyle, such as staying at a healthy weight, may also help you lower your blood pressure. · Eat heart-healthy foods. These include fruits, vegetables, whole grains, fish, and low-fat or nonfat dairy foods. Limit sodium, alcohol, and sweets. · If your doctor recommends it, get more exercise. Walking is a good choice. Bit by bit, increase the amount you walk every day. Try for at least 30 minutes on most days of the week. · Don't smoke. Smoking can make diabetes worse and increase your risk of heart attack or stroke. If you need help quitting, talk to your doctor about stop-smoking programs and medicines. These can increase your chances of quitting for good. · Think about taking medicines for your heart. For example, your doctor may suggest taking a statin or daily aspirin. Where can you learn more? Go to https://[employer] GrouppeJaeger.Zadego. org and sign in to your [employer] account. Enter X220 in the [employer] box to learn more about \"Learning About the Risk of Heart Attack and Stroke With Diabetes. \"     If you do not have an account, please click on the \"Sign Up Now\" link. Current as of: December 20, 2019               Content Version: 12.6  © 5294-9738 [employer]. Care instructions adapted under license by [facility] ([facility]). If you have questions about a medical condition or this instruction, always ask your healthcare professional. [place] [address] any warranty or liability for your use of this information.

## 2022-02-07 NOTE — PROGRESS NOTES
[address] [facility] PRIMARY CARE [address] [address]  Dept: [phone]  Dept Fax: [phone]    Last encounter 11/1/2021    3 Month Follow-Up (DM, HTN. Pt states no concerns at this time)       HPI:   Fidel Muir is a 80 y.o. female who presentstoday for her medical conditions/complaints as noted below. Fidel Muir is c/o of 3 Month Follow-Up (DM, HTN. Pt states no concerns at this time)      HPI  Established patient      Chronic checkup patient is a 69-year-old female  lives by herself does enjoy going out once a week with her friend has a son that is a paramedic that keeps an eye on her also is involved with her granddaughters care some and guidance    Well controlled on recent labs  Doing much better with consistent meds Janumet through indigent program.  Reminded patient she needs to renew this program appears to be in August of this year she did not choose to change her Medicaid supplement insurance with the reminded acknowledgment that she has to pay out $4100 for her medication prior to any insurance coverage she feels she may choose to change that this year not realizing she is through the open enrollment. Again    Lab Results   Component Value Date    LABA1C 6.4 (H) 02/02/2022     Lab Results   Component Value Date     02/02/2022       Treatment Adherence:   Medication compliance:  compliant all of the time  Diet compliance:  compliant all of the time  Weight trend: decreasing  Current exercise: no regular exercise  Barriers: stress and medication side effects    Diabetes Mellitus Type 2: Current symptoms/problems include none. Home blood sugar records: patient does not test  Any episodes of hypoglycemia? no  Eye exam current (within one year): yes  Tobacco history: She  reports that she has never smoked. She has never used smokeless tobacco.   Daily Aspirin?  No:     Hypertension:  Home blood pressure monitoring: No.  She is adherent to a low sodium diet. Patient denies chest pain, shortness of breath, headache, lightheadedness and blurred vision. Antihypertensive medication side effects: no medication side effects noted. Use of agents associated with hypertension: none.      Osteopenia patient has dental work that she is getting done so has not chosen to start biphosphonate yet this is encouraged    Anxiety and depression is well controlled with her Effexor medication states she is sleeping well not having tearful times awaiting warmer weather    Reviewed prior notes None  Reviewed previous Labs, Imaging and Hospital Records    Past Medical History:   Diagnosis Date    Anxiety     Hyperlipidemia     Hypertension     onset early 40'U    Lichen simplex 3023    shoulders and right leg    Osteopenia 2011    femoral necks jean -1.9, -1.8      Past Surgical History:   Procedure Laterality Date    CHOLECYSTECTOMY      age 35   Olga Masterson COLON SURGERY      colon resection bowel blockage/adhesions-gangrene    COLONOSCOPY  2016    Mikey GORDON    COLONOSCOPY N/A 2021    COLONOSCOPY  repeat in 2 years , result polypectomy Chao Hooper MD at [facility]      x3 pulled    HYSTERECTOMY      kept ovaries       Family History   Problem Relation Age of Onset    Hypertension Mother     Dementia Mother     Hypertension Father     Heart Disease Father         stroke during heart surgery     High Cholesterol Father     Heart Disease Brother         cath with stents    Heart Attack Maternal Grandmother     Other Paternal Grandmother         cerbral aneurysm    Alcohol Abuse Brother     Cancer Brother         kidney       Social History     Tobacco Use    Smoking status: Never Smoker    Smokeless tobacco: Never Used    Tobacco comment: hx smoking-6 years 1/2 ppd or less   Substance Use Topics    Alcohol use: No      Current Outpatient Medications   Medication Sig Dispense Refill    clindamycin (CLEOCIN) 300 MG capsule Take 2 capsules by mouth once for 1 dose Prior to dental procedure      losartan (COZAAR) 100 MG tablet Take 1 tablet by mouth daily For hypertension/kidney protection with diabetes 90 tablet 1    amLODIPine (NORVASC) 10 MG tablet Take 1 tablet by mouth daily 90 tablet 1    atorvastatin (LIPITOR) 20 MG tablet Take 1 tablet by mouth daily 90 tablet 1    venlafaxine (EFFEXOR XR) 75 MG extended release capsule Take 1 capsule by mouth daily 90 capsule 1    Calcium Carbonate-Vitamin D (CALCIUM PLUS VITAMIN D) 500-50 MG-UNIT CAPS Take 1 capsule by mouth daily 60 capsule     Vitamin D, Cholecalciferol, 50 MCG (2000 UT) CAPS Take 2,000 Units by mouth daily For vitamin D deficiency 30 capsule 2    TGT PSYLLIUM FIBER PO Take 2 capsules by mouth daily      aspirin 81 MG tablet Take 81 mg by mouth daily      sitaGLIPtan-metFORMIN (JANUMET)  MG per tablet Take 1 tablet by mouth daily (with breakfast) 28 tablet 0    blood glucose test strips (ASCENSIA AUTODISC VI;ONE TOUCH ULTRA TEST VI) strip Test sugar Bid and as needed Dx E 11.9 100 each 3    Lancets MISC Testing Blood sugar once daily and as needed. 100 each 1     No current facility-administered medications for this visit.      Allergies   Allergen Reactions    Penicillins Hives    Dust Mite Extract Other (See Comments)     headache       Health Maintenance   Topic Date Due    DTaP/Tdap/Td vaccine (1 - Tdap) Never done    Shingles Vaccine (2 of 3) 05/26/2014    Diabetic retinal exam  06/23/2021    Annual Wellness Visit (AWV)  12/08/2021    Diabetic foot exam  03/08/2022    Depression Monitoring  03/08/2022    A1C test (Diabetic or Prediabetic)  02/02/2023    Diabetic microalbuminuria test  02/02/2023    Lipid screen  02/02/2023    Potassium monitoring  02/02/2023    Creatinine monitoring  02/02/2023    Colon cancer screen colonoscopy  03/22/2023    Breast cancer screen  08/10/2023    DEXA (modify frequency per [facility] score)  Completed    Flu vaccine  Completed    Pneumococcal 65+ years Vaccine  Completed    COVID-19 Vaccine  Completed    Hepatitis C screen  Completed    Hepatitis A vaccine  Aged Out    Hib vaccine  Aged Out    Meningococcal (ACWY) vaccine  Aged Out       Subjective:      Review of Systems   Constitutional: Negative for activity change, chills and fever. HENT: Negative for congestion, rhinorrhea and sore throat. Eyes: Negative. Respiratory: Negative for cough and shortness of breath. Cardiovascular: Negative for chest pain and leg swelling. Gastrointestinal: Negative for abdominal distention. Genitourinary: Negative for difficulty urinating. Musculoskeletal: Negative for arthralgias. Skin: Negative for rash. Neurological: Negative for dizziness and headaches. Psychiatric/Behavioral: The patient is nervous/anxious. Objective:     Physical Exam  Vitals and nursing note reviewed. Constitutional:       General: She is not in acute distress. Appearance: She is well-developed. HENT:      Head: Normocephalic and atraumatic. Right Ear: External ear normal.      Left Ear: External ear normal.   Eyes:      General: No scleral icterus. Pupils: Pupils are equal, round, and reactive to light. Neck:      Vascular: No carotid bruit (neg jean ). Cardiovascular:      Rate and Rhythm: Normal rate and regular rhythm. Heart sounds: Normal heart sounds. No murmur heard. Pulmonary:      Effort: Pulmonary effort is normal. No respiratory distress. Breath sounds: Normal breath sounds. No wheezing. Abdominal:      Palpations: Abdomen is soft. Tenderness: There is no abdominal tenderness. Musculoskeletal:         General: Normal range of motion. Cervical back: Normal range of motion and neck supple. Right lower leg: No edema. Left lower leg: No edema. Lymphadenopathy:      Cervical: No cervical adenopathy.    Skin:     General: Skin is warm and dry. Neurological:      Mental Status: She is alert and oriented to person, place, and time. Psychiatric:         Behavior: Behavior normal.         Thought Content: Thought content normal.         Judgment: Judgment normal.       BP (!) 148/82 (Site: Left Upper Arm)   Pulse 85   Wt 182 lb (82.6 kg)   LMP  (LMP Unknown) Comment: partial  SpO2 98%   BMI 32.24 kg/m²     Data:     Lab Results   Component Value Date     02/02/2022    K 4.1 02/02/2022     02/02/2022    CO2 20 02/02/2022    BUN 18 02/02/2022    CREATININE 0.68 02/02/2022    GLUCOSE 136 02/02/2022    GLUCOSE 179 04/12/2012    PROT 7.1 02/02/2022    LABALBU 3.5 02/02/2022    LABALBU 4.0 04/12/2012    BILITOT 0.26 02/02/2022    ALKPHOS 118 02/02/2022    AST 12 02/02/2022    ALT 13 02/02/2022     Lab Results   Component Value Date    WBC 14.1 07/30/2021    RBC 5.23 07/30/2021    HGB 12.9 07/30/2021    HCT 38.9 07/30/2021    MCV 74.4 07/30/2021    MCH 24.6 07/30/2021    MCHC 33.1 07/30/2021    RDW 15.6 07/30/2021     07/30/2021    MPV NOT REPORTED 07/30/2021     Lab Results   Component Value Date    TSH 3.33 07/30/2021     Lab Results   Component Value Date    CHOL 137 02/02/2022    HDL 39 02/02/2022    LABA1C 6.4 02/02/2022          Assessment & Plan       1. Diabetes mellitus due to underlying condition with complication, without long-term current use of insulin ([address]  Janumet referral needed     2. Essential hypertension, benign  Stable   - losartan (COZAAR) 100 MG tablet; Take 1 tablet by mouth daily For hypertension/kidney protection with diabetes  Dispense: 90 tablet; Refill: 1    3. Anxiety and depression  Continue effexor   Stable     4. Osteopenia of both hips  Consider biphosphonate when teeth repair done     5. Mixed hyperlipidemia  Continue statin   Lab Results   Component Value Date    LDLCHOLESTEROL 75 02/02/2022         6.  Need for diphtheria-tetanus-pertussis (Tdap) vaccine  Get at local pharmacy   - Tdap (age 10y and older) IM (BOOSTRIX)        See in 6 months AWV           Completed Refills   Requested Prescriptions     Signed Prescriptions Disp Refills    losartan (COZAAR) 100 MG tablet 90 tablet 1     Sig: Take 1 tablet by mouth daily For hypertension/kidney protection with diabetes     Return in about 3 months (around 5/7/2022) for June for  [facility] medicare visit-AWV. Orders Placed This Encounter   Medications    losartan (COZAAR) 100 MG tablet     Sig: Take 1 tablet by mouth daily For hypertension/kidney protection with diabetes     Dispense:  90 tablet     Refill:  1     Orders Placed This Encounter   Procedures    Tdap (age 6y and older) IM ([address] [place] [address] Extension)     Scheduling Instructions:      Pt needs Tdap updated please give at [address] received counseling on the following healthy behaviors: nutrition, exercise and medication adherence  Reviewed prior labs and health maintenance. Continue current medications, diet and exercise. Discussed use, benefit, and side effects of prescribed medications. Barriers to medication compliance addressed. Patient given educational materials - see patient instructions. All patient questions answered. Patient voiced understanding. On this date 2/7/2022 I have spent 28 minutes reviewing previous notes, test results and face to face with the patient discussing the diagnosis and importance of compliance with the treatment plan as well as documenting on the day of the visit.      Electronically signed by DANA Singh CNP on 2/9/2022 at 8:04 AM

## 2022-02-09 PROBLEM — F32.A ANXIETY AND DEPRESSION: Status: ACTIVE | Noted: 2022-02-09

## 2022-02-09 PROBLEM — M85.851 OSTEOPENIA OF BOTH HIPS: Status: ACTIVE | Noted: 2022-02-09

## 2022-02-09 PROBLEM — F41.9 ANXIETY AND DEPRESSION: Status: ACTIVE | Noted: 2022-02-09

## 2022-02-09 PROBLEM — E08.8 DIABETES MELLITUS DUE TO UNDERLYING CONDITION WITH COMPLICATION, WITHOUT LONG-TERM CURRENT USE OF INSULIN (HCC): Status: ACTIVE | Noted: 2022-02-09

## 2022-02-09 PROBLEM — M85.852 OSTEOPENIA OF BOTH HIPS: Status: ACTIVE | Noted: 2022-02-09

## 2022-02-09 ASSESSMENT — ENCOUNTER SYMPTOMS
SORE THROAT: 0
SHORTNESS OF BREATH: 0
EYES NEGATIVE: 1
RHINORRHEA: 0
COUGH: 0
ABDOMINAL DISTENTION: 0

## 2022-03-21 RX ORDER — SITAGLIPTIN AND METFORMIN HYDROCHLORIDE 1000; 50 MG/1; MG/1
1 TABLET, FILM COATED ORAL
Qty: 28 TABLET | Refills: 0 | Status: CANCELLED | OUTPATIENT
Start: 2022-03-21

## 2022-03-21 NOTE — TELEPHONE ENCOUNTER
Patient called pharmacy and her bottle of janumet she said states to take two but PCP directions state to take one      Please advise

## 2022-03-21 NOTE — TELEPHONE ENCOUNTER
Pt can get easily confused with medication. I asked pt to take janumet  ONCE a day in am. She originally was taking in too few calories to sustain a good blood glucose and glyco dropped down to 6.0 in Nov 2021     Thus with better dietary habits she is not at glyco 6.4 on last A1C check at office. She is taking it ONCE a day and been well controlled. Pt's Janumet bottle was sent into pt assistance when she was still taking it twice a day. .. thus all the refills for ONE year will continue to have that direction on the bottle.      So PLEASE continue to take the Janumet ONCE a day in am with breakfast.

## 2022-03-21 NOTE — TELEPHONE ENCOUNTER
Pt called in stating she has 2 pills left of her Janumet. She is asking for a refill, and I looked in her chart and found the number she needs to call for a refill.  If there's anything different she needs to do, please advise

## 2022-03-22 ENCOUNTER — TELEPHONE (OUTPATIENT)
Dept: FAMILY MEDICINE CLINIC | Age: 72
End: 2022-03-22

## 2022-03-22 NOTE — TELEPHONE ENCOUNTER
Pt has a $4500 deductible and needs to get med through her insurance. The metformin alone is not appropriately controlling her diabetes. Please assist pt in calling [facility] for refill. Thanks  Cassy CORTEZ CNP       Will not send local due to high deductible  Pt gets easily confused with meds.

## 2022-03-22 NOTE — TELEPHONE ENCOUNTER
This called originated from the [facility] ([facility]), I have no control on how many times a pt calls in. It is my job as  A MA to make provider's aware of their messages.

## 2022-03-22 NOTE — TELEPHONE ENCOUNTER
Called [employer] and successfully submitted a refill request via the automated system. Informed pt to write down the number to contact in the future [phone]. Pt states she has 1 pill left for tomorrow and was instructed to call back Thursday if she had not received her meds, and we could check on it for her. Also informed pt she is to only be taking Janumet ONCE daily in a.m. Dank Thompson voices understanding

## 2022-03-22 NOTE — TELEPHONE ENCOUNTER
This is 2nd message on same subject;     please quit duplicating calls send call to office to discuss with patient

## 2022-03-22 NOTE — TELEPHONE ENCOUNTER
----- Message from [place] sent at 3/22/2022  8:04 AM EDT -----  Subject: Message to Provider    QUESTIONS  Information for Provider? Patient wants to know if she can just get back   on metformin instead of taking Janumet. please advise  ---------------------------------------------------------------------------  --------------  CALL BACK INFO  What is the best way for the office to contact you? OK to leave message on   voicemail  Preferred Call Back Phone Number? [phone]  ---------------------------------------------------------------------------  --------------  SCRIPT ANSWERS  Relationship to Patient?  Self

## 2022-03-23 RX ORDER — SITAGLIPTIN AND METFORMIN HYDROCHLORIDE 1000; 50 MG/1; MG/1
1 TABLET, FILM COATED ORAL
Qty: 14 TABLET | Refills: 0 | Status: CANCELLED | COMMUNITY
Start: 2022-03-23

## 2022-03-23 RX ORDER — SITAGLIPTIN AND METFORMIN HYDROCHLORIDE 1000; 50 MG/1; MG/1
1 TABLET, FILM COATED ORAL
Qty: 14 TABLET | Refills: 0 | COMMUNITY
Start: 2022-03-23 | End: 2022-10-28 | Stop reason: ALTCHOICE

## 2022-04-11 ENCOUNTER — HOSPITAL ENCOUNTER (OUTPATIENT)
Dept: GENERAL RADIOLOGY | Age: 72
Discharge: HOME | End: 2022-04-13
Payer: MEDICARE

## 2022-04-11 ENCOUNTER — OFFICE VISIT (OUTPATIENT)
Dept: FAMILY MEDICINE CLINIC | Age: 72
End: 2022-04-11
Payer: MEDICARE

## 2022-04-11 ENCOUNTER — HOSPITAL ENCOUNTER (OUTPATIENT)
Age: 72
Discharge: HOME | End: 2022-04-13
Payer: MEDICARE

## 2022-04-11 VITALS
HEIGHT: 63 IN | SYSTOLIC BLOOD PRESSURE: 140 MMHG | WEIGHT: 184 LBS | HEART RATE: 96 BPM | BODY MASS INDEX: 32.6 KG/M2 | OXYGEN SATURATION: 98 % | DIASTOLIC BLOOD PRESSURE: 78 MMHG

## 2022-04-11 DIAGNOSIS — F32.A ANXIETY AND DEPRESSION: ICD-10-CM

## 2022-04-11 DIAGNOSIS — R29.890 HEIGHT LOSS: ICD-10-CM

## 2022-04-11 DIAGNOSIS — F41.9 ANXIETY AND DEPRESSION: ICD-10-CM

## 2022-04-11 DIAGNOSIS — M75.41 SHOULDER IMPINGEMENT SYNDROME, RIGHT: ICD-10-CM

## 2022-04-11 DIAGNOSIS — I10 ESSENTIAL HYPERTENSION, BENIGN: ICD-10-CM

## 2022-04-11 DIAGNOSIS — E08.8 DIABETES MELLITUS DUE TO UNDERLYING CONDITION WITH COMPLICATION, WITHOUT LONG-TERM CURRENT USE OF INSULIN (HCC): ICD-10-CM

## 2022-04-11 DIAGNOSIS — M50.123 CERVICAL DISC DISORDER AT C6-C7 LEVEL WITH RADICULOPATHY: Primary | ICD-10-CM

## 2022-04-11 PROCEDURE — G8427 DOCREV CUR MEDS BY ELIG CLIN: HCPCS | Performed by: NURSE PRACTITIONER

## 2022-04-11 PROCEDURE — 72050 X-RAY EXAM NECK SPINE 4/5VWS: CPT

## 2022-04-11 PROCEDURE — 1036F TOBACCO NON-USER: CPT | Performed by: NURSE PRACTITIONER

## 2022-04-11 PROCEDURE — 4040F PNEUMOC VAC/ADMIN/RCVD: CPT | Performed by: NURSE PRACTITIONER

## 2022-04-11 PROCEDURE — 3017F COLORECTAL CA SCREEN DOC REV: CPT | Performed by: NURSE PRACTITIONER

## 2022-04-11 PROCEDURE — 99213 OFFICE O/P EST LOW 20 MIN: CPT | Performed by: NURSE PRACTITIONER

## 2022-04-11 PROCEDURE — G8417 CALC BMI ABV UP PARAM F/U: HCPCS | Performed by: NURSE PRACTITIONER

## 2022-04-11 PROCEDURE — 1123F ACP DISCUSS/DSCN MKR DOCD: CPT | Performed by: NURSE PRACTITIONER

## 2022-04-11 PROCEDURE — G8399 PT W/DXA RESULTS DOCUMENT: HCPCS | Performed by: NURSE PRACTITIONER

## 2022-04-11 PROCEDURE — 1090F PRES/ABSN URINE INCON ASSESS: CPT | Performed by: NURSE PRACTITIONER

## 2022-04-11 RX ORDER — PREDNISONE 20 MG/1
20 TABLET ORAL DAILY
Qty: 5 TABLET | Refills: 0 | Status: SHIPPED | OUTPATIENT
Start: 2022-04-11 | End: 2022-04-16

## 2022-04-11 ASSESSMENT — PATIENT HEALTH QUESTIONNAIRE - PHQ9
SUM OF ALL RESPONSES TO PHQ9 QUESTIONS 1 & 2: 0
8. MOVING OR SPEAKING SO SLOWLY THAT OTHER PEOPLE COULD HAVE NOTICED. OR THE OPPOSITE, BEING SO FIGETY OR RESTLESS THAT YOU HAVE BEEN MOVING AROUND A LOT MORE THAN USUAL: 0
SUM OF ALL RESPONSES TO PHQ QUESTIONS 1-9: 0
5. POOR APPETITE OR OVEREATING: 0
2. FEELING DOWN, DEPRESSED OR HOPELESS: 0
3. TROUBLE FALLING OR STAYING ASLEEP: 0
1. LITTLE INTEREST OR PLEASURE IN DOING THINGS: 0
SUM OF ALL RESPONSES TO PHQ QUESTIONS 1-9: 0
6. FEELING BAD ABOUT YOURSELF - OR THAT YOU ARE A FAILURE OR HAVE LET YOURSELF OR YOUR FAMILY DOWN: 0
SUM OF ALL RESPONSES TO PHQ QUESTIONS 1-9: 0
SUM OF ALL RESPONSES TO PHQ QUESTIONS 1-9: 0
7. TROUBLE CONCENTRATING ON THINGS, SUCH AS READING THE NEWSPAPER OR WATCHING TELEVISION: 0
9. THOUGHTS THAT YOU WOULD BE BETTER OFF DEAD, OR OF HURTING YOURSELF: 0
4. FEELING TIRED OR HAVING LITTLE ENERGY: 0
10. IF YOU CHECKED OFF ANY PROBLEMS, HOW DIFFICULT HAVE THESE PROBLEMS MADE IT FOR YOU TO DO YOUR WORK, TAKE CARE OF THINGS AT HOME, OR GET ALONG WITH OTHER PEOPLE: 0

## 2022-04-11 NOTE — PATIENT INSTRUCTIONS
You may be receiving a survey from [employer] regarding your visit today. You may get this in the mail, through your MyChart or in your email. Please complete the survey to enable us to provide the highest quality of care to you and your family. If you cannot score us as very good ( 5 Stars) on any question, please feel free to call the office to discuss how we could have made your experience exceptional.     Thank You! Radha Prado, DANA-CNP  [address] [place]        Phone: [phone]  Fax: [phone] Office Hours:  Monday: [facility] office location 8-5 ([phone]) Offering additional late hours the first Monday of the month until 7 pm.   Tuesday: 8-5 Wednesday: 8-5 Thursday:  Additional hours offered 2 Thursdays a month. Please call to inquire those dates. Fridays: 7:30-4:30      Insurance coverage     [employer] insurance- medicare supplement part D plan. Right shoulder impingment and cervical disc radiculopathy C6 dermatome     Patient Education      Patient Education        Cervical Disc Disease: Care Instructions  Your Care Instructions     Cervical disc disease results from damage, disease, or wear and tear to the discs between the bones (vertebra) in your neck. The discs act as shock absorbers for the spine and keep the spine flexible. When a disc is damaged, it can bulge out and press against the nerve roots or spinal cord. This is sometimes called a herniated or \"slipped disc. \" This pressure can cause pain and numbness or tingling in your arms and hands. It can also cause weakness inyour legs. An accident can damage a disc and cause it to break open (rupture). Aging andhard physical work can also cause damage to cervical discs. The first treatments for cervical disc disease include physical therapy, special neck exercises, heat, and pain medicine. If these fail, your doctor may inject steroids and pain medicine into your neck. Surgery is usually done onlyif other treatments have not worked. Follow-up care is a key part of your treatment and safety. Be sure to make and go to all appointments, and call your doctor if you are having problems. It's also a good idea to know your test results and keep alist of the medicines you take. How can you care for yourself at home?  Take pain medicines exactly as directed. ? If the doctor gave you a prescription medicine for pain, take it as prescribed. ? If you are not taking a prescription pain medicine, ask your doctor if you can take an over-the-counter medicine.  Don't spend too long in one position. Take short breaks to move around and change positions.  Wear a seat belt and shoulder harness when you are in a car.  Sleep with a pillow under your head and neck that keeps your neck straight.  Follow your doctor's instructions for gentle neck-stretching exercises.  Do not smoke. Smoking can slow healing of your discs. If you need help quitting, talk to your doctor about stop-smoking programs and medicines. These can increase your chances of quitting for good.  Avoid strenuous work or exercise until your doctor says it is okay. When should you call for help? Call 911 anytime you think you may need emergency care. For example, call if:     You are unable to move an arm or a leg at all. Call your doctor now or seek immediate medical care if:     You have new or worse symptoms in your arms, legs, belly, or buttocks. Symptoms may include:  ? Numbness or tingling. ? Weakness. ? Pain.      You lose bladder or bowel control. Watch closely for changes in your health, and be sure to contact your doctor if:     You do not get better as expected. Where can you learn more? Go to https://jordin.BootstrapLabs. org and sign in to your [employer] account. Enter N118 in the BindHQ box to learn more about \"Cervical Disc Disease: Care Instructions. \"     If you do not have an account, please click on the \"Sign Up Now\" link. Current as of: July 1, 2021               Content Version: 13.2  © 2006-2022 Healthwise, Incorporated. Care instructions adapted under license by [facility] ([facility]). If you have questions about a medical condition or this instruction, always ask your healthcare professional. Rande Peabody any warranty or liability for your use of this information. Shoulder Arthritis: Exercises  Introduction  Here are some examples of exercises for you to try. The exercises may be suggested for a condition or for rehabilitation. Start each exercise slowly. Ease off the exercises if you start to have pain. You will be told when to start these exercises and which ones will work bestfor you. How to do the exercises  Shoulder flexion (lying down)    To make a wand for this exercise, use a piece of PVC pipe or a broom handlewith the broom removed. Make the wand about a foot wider than your shoulders. 1. Lie on your back, holding a wand with both hands. Your palms should face down as you hold the wand. 2. Keeping your elbows straight, slowly raise your arms over your head. Raise them until you feel a stretch in your shoulders, upper back, and chest.  3. Hold for 15 to 30 seconds. 4. Repeat 2 to 4 times. Shoulder rotation (lying down)    To make a wand for this exercise, use a piece of PVC pipe or a broom handlewith the broom removed. Make the wand about a foot wider than your shoulders. 1. Lie on your back. Hold a wand with both hands with your elbows bent and palms up. 2. Keep your elbows close to your body, and move the wand across your body toward the sore arm. 3. Hold for 8 to 12 seconds. 4. Repeat 2 to 4 times. Shoulder internal rotation with towel    1. Hold a towel above and behind your head with the arm that is not sore. 2. With your sore arm, reach behind your back and grasp the towel.   3. With the arm above your head, pull the towel upward. Do this until you feel a stretch on the front and outside of your sore shoulder. 4. Hold 15 to 30 seconds. 5. Repeat 2 to 4 times. Shoulder blade squeeze    1. Stand with your arms at your sides, and squeeze your shoulder blades together. Do not raise your shoulders up as you squeeze. 2. Hold 6 seconds. 3. Repeat 8 to 12 times. Resisted rows    For this exercise, you will need elastic exercise material, such as surgicaltubing or Thera-Band. 1. Put the band around a solid object at about waist level. (A bedpost will work well.) Each hand should hold an end of the band. 2. With your elbows at your sides and bent to 90 degrees, pull the band back. Your shoulder blades should move toward each other. Return to the starting position. 3. Repeat 8 to 12 times. External rotator strengthening exercise    1. Start by tying a piece of elastic exercise material to a doorknob. You can use surgical tubing or Thera-Band. (You may also hold one end of the band in each hand.)  2. Stand or sit with your shoulder relaxed and your elbow bent 90 degrees. Your upper arm should rest comfortably against your side. Squeeze a rolled towel between your elbow and your body for comfort. This will help keep your arm at your side. 3. Hold one end of the elastic band with the hand of the painful arm. 4. Start with your forearm across your belly. Slowly rotate the forearm out away from your body. Keep your elbow and upper arm tucked against the towel roll or the side of your body until you begin to feel tightness in your shoulder. Slowly move your arm back to where you started. 5. Repeat 8 to 12 times. Internal rotator strengthening exercise    1. Start by tying a piece of elastic exercise material to a doorknob. You can use surgical tubing or Thera-Band. 2. Stand or sit with your shoulder relaxed and your elbow bent 90 degrees. Your upper arm should rest comfortably against your side.  Squeeze a rolled towel between your elbow and your body for comfort. This will help keep your arm at your side. 3. Hold one end of the elastic band in the hand of the painful arm. 4. Slowly rotate your forearm toward your body until it touches your belly. Slowly move it back to where you started. 5. Keep your elbow and upper arm firmly tucked against the towel roll or at your side. 6. Repeat 8 to 12 times. Pendulum swing    If you have pain in your back, do not do this exercise. 1. Hold on to a table or the back of a chair with your good arm. Then bend forward a little and let your sore arm hang straight down. This exercise does not use the arm muscles. Rather, use your legs and your hips to create movement that makes your arm swing freely. 2. Use the movement from your hips and legs to guide the slightly swinging arm back and forth like a pendulum (or elephant trunk). Then guide it in circles that start small (about the size of a dinner plate). Make the circles a bit larger each day, as your pain allows. 3. Do this exercise for 5 minutes, 5 to 7 times each day. 4. As you have less pain, try bending over a little farther to do this exercise. This will increase the amount of movement at your shoulder. Follow-up care is a key part of your treatment and safety. Be sure to make and go to all appointments, and call your doctor if you are having problems. It's also a good idea to know your test results and keep alist of the medicines you take. Where can you learn more? Go to https://Colored SolarpeIntelligent Data Sensor Devices.Wavemaker Software. org and sign in to your [employer] account. Enter H562 in the Action Engine box to learn more about \"Shoulder Arthritis: Exercises. \"     If you do not have an account, please click on the \"Sign Up Now\" link. Current as of: July 1, 2021               Content Version: 13.2  © 7321-4359 Healthwise, Incorporated. Care instructions adapted under license by [facility] ([facility]).  If you have questions about a medical condition or this instruction, always ask your healthcare professional. [place] [address] any warranty or liability for your use of this information.

## 2022-04-11 NOTE — PROGRESS NOTES
[facility]  [facility]  [address]  [place] [address]  Dept: [phone]  Dept Fax: [phone]    Last encounter 2/7/2022    Arm Pain (Pt received tetanus shot over 3 weeks ago (right arm), has had arm pain ever since. Starts right above her elbow and goes all the way up to shoulder, describes it as a dull ache.  )       HPI:   Deion Maloney is a 80 y.o. female who presentstoday for her medical conditions/complaints as noted below. Deion Maloney is c/o of Arm Pain (Pt received tetanus shot over 3 weeks ago (right arm), has had arm pain ever since. Starts right above her elbow and goes all the way up to shoulder, describes it as a dull ache.  )      HPI  Established patient    Pt here for med review which are correct. Discovered her health insurance lapsed,  She did not sign up again last fall. Tdap 3-4 weeks ago and c/o soreness in area of right arm, also suspect cervical dystonia/arthritis    C6 radiculopathy pattern described by pt. Right handed female    No OTC products have been tried. Pt c/o nervousness and is anxious with her health insurance lapse awareness today. She feels she is taking her meds appropriately , has worked with care coordinator to get her diabetes medication covered. Pt currently getting Janumet through pt assistance program.     In past patient's united health insurance who asked me to call [employer]  [phone]- [employer]um rx. Patient did not have this med coverage. Called [facility] [employer]- she has [employer] part D prescription insurance. Pt has $4100 deductible annually prior to insurance covering meds.        Reviewed prior notes None  Reviewed previous Labs, Imaging and Hospital Records    Past Medical History:   Diagnosis Date    Anxiety     Hyperlipidemia     Hypertension     onset early 69'G    Lichen simplex 5143    shoulders and right leg    Osteopenia 04/2011    femoral necks jean -1.9, -1.8      Past Surgical History: Procedure Laterality Date    CHOLECYSTECTOMY      age 35    COLON SURGERY      colon resection bowel blockage/adhesions-gangrene    COLONOSCOPY  2016    Mikey GORDON    COLONOSCOPY N/A 2021    COLONOSCOPY  repeat in 2 years , result polypectomy Pebbles Jimenez [facility] at [facility]      x3 pulled    HYSTERECTOMY      kept ovaries       Family History   Problem Relation Age of Onset    Hypertension Mother     Dementia Mother     Hypertension Father     Heart Disease Father         stroke during heart surgery     High Cholesterol Father     Heart Disease Brother         cath with stents    Heart Attack Maternal Grandmother     Other Paternal Grandmother         cerbral aneurysm    Alcohol Abuse Brother     Cancer Brother         kidney       Social History     Tobacco Use    Smoking status: Never Smoker    Smokeless tobacco: Never Used    Tobacco comment: hx smoking-6 years 1/2 ppd or less   Substance Use Topics    Alcohol use: No      Current Outpatient Medications   Medication Sig Dispense Refill    Cholecalciferol (VITAMIN D3) 125 MCG (5000 UT) TABS Take by mouth daily      predniSONE (DELTASONE) 20 MG tablet Take 1 tablet by mouth daily for 5 days For right should impingement and Cervical 6 radiculopathy 5 tablet 0    sitaGLIPtan-metFORMIN (JANUMET)  MG per tablet Take 1 tablet by mouth daily (with breakfast) 14 tablet 0    losartan (COZAAR) 100 MG tablet Take 1 tablet by mouth daily For hypertension/kidney protection with diabetes 90 tablet 1    amLODIPine (NORVASC) 10 MG tablet Take 1 tablet by mouth daily 90 tablet 1    atorvastatin (LIPITOR) 20 MG tablet Take 1 tablet by mouth daily 90 tablet 1    venlafaxine (EFFEXOR XR) 75 MG extended release capsule Take 1 capsule by mouth daily 90 capsule 1    Calcium Carbonate-Vitamin D (CALCIUM PLUS VITAMIN D) 500-50 MG-UNIT CAPS Take 1 capsule by mouth daily 60 capsule     TGT PSYLLIUM FIBER PO Take 2 capsules by mouth daily      aspirin 81 MG tablet Take 81 mg by mouth daily      clindamycin (CLEOCIN) 300 MG capsule Take 2 capsules by mouth once for 1 dose Prior to dental procedure (Patient not taking: Reported on 4/11/2022)      blood glucose test strips (ASCENSIA AUTODISC VI;ONE TOUCH ULTRA TEST VI) strip Test sugar Bid and as needed Dx E 11.9 100 each 3    Lancets MISC Testing Blood sugar once daily and as needed. 100 each 1     No current facility-administered medications for this visit. Allergies   Allergen Reactions    Penicillins Hives    Dust Mite Extract Other (See Comments)     headache       Health Maintenance   Topic Date Due    Shingles Vaccine (2 of 3) 05/26/2014    Annual Wellness Visit (AWV)  12/08/2021    Diabetic foot exam  03/08/2022    A1C test (Diabetic or Prediabetic)  02/02/2023    Diabetic microalbuminuria test  02/02/2023    Lipid screen  02/02/2023    Potassium monitoring  02/02/2023    Creatinine monitoring  02/02/2023    Colorectal Cancer Screen  03/22/2023    Diabetic retinal exam  04/05/2023    Depression Monitoring  04/11/2023    Breast cancer screen  08/10/2023    DTaP/Tdap/Td vaccine (2 - Td or Tdap) 02/19/2032    DEXA (modify frequency per FRAX score)  Completed    Flu vaccine  Completed    Pneumococcal 65+ years Vaccine  Completed    COVID-19 Vaccine  Completed    Hepatitis C screen  Completed    Hepatitis A vaccine  Aged Out    Hib vaccine  Aged Out    Meningococcal (ACWY) vaccine  Aged Out       Subjective:      Review of Systems   Constitutional: Negative for activity change and chills. HENT: Negative for congestion and rhinorrhea. Eyes: Negative. Respiratory: Negative for cough and shortness of breath. Cardiovascular: Negative for chest pain and leg swelling. Gastrointestinal: Negative for abdominal distention. Endocrine: Negative for cold intolerance and heat intolerance.    Genitourinary: Negative for difficulty urinating. Musculoskeletal: Negative for arthralgias. Skin: Negative for rash. Neurological: Negative for dizziness and headaches. Psychiatric/Behavioral: Negative for agitation. Objective:     Physical Exam  Vitals and nursing note reviewed. Constitutional:       General: She is not in acute distress. Appearance: Normal appearance. She is well-developed. HENT:      Head: Normocephalic and atraumatic. Right Ear: Tympanic membrane and external ear normal.      Left Ear: Tympanic membrane and external ear normal.      Nose: No congestion. Mouth/Throat:      Mouth: Mucous membranes are moist.   Eyes:      General: No scleral icterus. Pupils: Pupils are equal, round, and reactive to light. Neck:      Vascular: No carotid bruit (neg jean ). Cardiovascular:      Rate and Rhythm: Normal rate and regular rhythm. Heart sounds: Normal heart sounds. No murmur heard. Pulmonary:      Effort: Pulmonary effort is normal. No respiratory distress. Breath sounds: Normal breath sounds. No wheezing. Abdominal:      Palpations: Abdomen is soft. Tenderness: There is no abdominal tenderness. Musculoskeletal:         General: Tenderness (right neck radiculopathy, C4-6 tricep reflex  absent R, left present. decreased extension of neck and limited side to side motion. ) present. Normal range of motion. Cervical back: Normal range of motion and neck supple. Right lower leg: No edema. Left lower leg: No edema. Lymphadenopathy:      Cervical: No cervical adenopathy. Skin:     General: Skin is warm and dry. Findings: No rash. Neurological:      Mental Status: She is alert and oriented to person, place, and time. Psychiatric:         Behavior: Behavior normal.         Thought Content:  Thought content normal.         Judgment: Judgment normal.       BP (!) 140/78 (Site: Right Upper Arm)   Pulse 96   Ht 5' 3\" (1.6 m)   Wt 184 lb (83.5 kg)   LMP (LMP Unknown) Comment: partial  SpO2 98%   BMI 32.59 kg/m²     Data:     Lab Results   Component Value Date     02/02/2022    K 4.1 02/02/2022     02/02/2022    CO2 20 02/02/2022    BUN 18 02/02/2022    CREATININE 0.68 02/02/2022    GLUCOSE 136 02/02/2022    GLUCOSE 179 04/12/2012    PROT 7.1 02/02/2022    LABALBU 3.5 02/02/2022    LABALBU 4.0 04/12/2012    BILITOT 0.26 02/02/2022    ALKPHOS 118 02/02/2022    AST 12 02/02/2022    ALT 13 02/02/2022     Lab Results   Component Value Date    WBC 14.1 07/30/2021    RBC 5.23 07/30/2021    HGB 12.9 07/30/2021    HCT 38.9 07/30/2021    MCV 74.4 07/30/2021    MCH 24.6 07/30/2021    MCHC 33.1 07/30/2021    RDW 15.6 07/30/2021     07/30/2021    MPV NOT REPORTED 07/30/2021     Lab Results   Component Value Date    TSH 3.33 07/30/2021     Lab Results   Component Value Date    CHOL 137 02/02/2022    HDL 39 02/02/2022    LABA1C 6.4 02/02/2022          Assessment & Plan       1. Cervical disc disorder at C6-C7 level with radiculopathy  Steroid boost   Gentle stretching  Physical therapy if coverage available. - predniSONE (DELTASONE) 20 MG tablet; Take 1 tablet by mouth daily for 5 days For right should impingement and Cervical 6 radiculopathy  Dispense: 5 tablet; Refill: 0  - [facility] [place]    2. Shoulder impingement syndrome, right    - predniSONE (DELTASONE) 20 MG tablet; Take 1 tablet by mouth daily for 5 days For right should impingement and Cervical 6 radiculopathy  Dispense: 5 tablet; Refill: 0  - [facility] [place]    3. Height loss    - XR CERVICAL SPINE (4-5 VIEWS); Future    4.  Diabetes mellitus due to underlying condition with complication, without long-term current use of insulin ([address]  Lab Results   Component Value Date    LABA1C 6.4 (H) 02/02/2022     Lab Results   Component Value Date     02/02/2022     Due to insurance coverage loss pt still remains on janumet through patient assistance program. 5. Essential hypertension, benign  Stable     6. Anxiety and depression  Elevated with loss insurance                  Completed Refills   Requested Prescriptions     Signed Prescriptions Disp Refills    predniSONE (DELTASONE) 20 MG tablet 5 tablet 0     Sig: Take 1 tablet by mouth daily for 5 days For right should impingement and Cervical 6 radiculopathy     No follow-ups on file. Orders Placed This Encounter   Medications    predniSONE (DELTASONE) 20 MG tablet     Sig: Take 1 tablet by mouth daily for 5 days For right should impingement and Cervical 6 radiculopathy     Dispense:  5 tablet     Refill:  0     Orders Placed This Encounter   Procedures    XR CERVICAL SPINE (4-5 VIEWS)     Standing Status:   Future     Number of Occurrences:   1     Standing Expiration Date:   4/11/2023     Order Specific Question:   Reason for exam:     Answer:   c6 radiculopathy, no injury    [facility]     Referral Priority:   Routine     Referral Type:   Eval and Treat     Referral Reason:   Specialty Services Required     Requested Specialty:   Physical Therapy     Number of Visits Requested:   1         Fox Addison received counseling on the following healthy behaviors: nutrition, exercise and medication adherence  Reviewed prior labs and health maintenance. Continue current medications, diet and exercise. Discussed use, benefit, and side effects of prescribed medications. Barriers to medication compliance addressed. Patient given educational materials - see patient instructions. All patient questions answered. Patient voiced understanding. On this date 4/11/2022 I have spent 21 minutes reviewing previous notes, test results and face to face with the patient discussing the diagnosis and importance of compliance with the treatment plan as well as documenting on the day of the visit.      Electronically signed by DANA Ventura CNP on 4/16/2022 at 12:48 PM

## 2022-04-16 ASSESSMENT — ENCOUNTER SYMPTOMS
RHINORRHEA: 0
COUGH: 0
SHORTNESS OF BREATH: 0
ABDOMINAL DISTENTION: 0
EYES NEGATIVE: 1

## 2022-05-03 DIAGNOSIS — I10 ESSENTIAL HYPERTENSION, BENIGN: ICD-10-CM

## 2022-05-03 DIAGNOSIS — F32.A ANXIETY AND DEPRESSION: ICD-10-CM

## 2022-05-03 DIAGNOSIS — E78.00 PURE HYPERCHOLESTEROLEMIA: ICD-10-CM

## 2022-05-03 DIAGNOSIS — F41.9 ANXIETY AND DEPRESSION: ICD-10-CM

## 2022-05-03 RX ORDER — VENLAFAXINE HYDROCHLORIDE 75 MG/1
75 CAPSULE, EXTENDED RELEASE ORAL DAILY
Qty: 90 CAPSULE | Refills: 1 | Status: SHIPPED | OUTPATIENT
Start: 2022-05-03 | End: 2022-08-04

## 2022-05-03 RX ORDER — ATORVASTATIN CALCIUM 20 MG/1
20 TABLET, FILM COATED ORAL DAILY
Qty: 90 TABLET | Refills: 1 | Status: SHIPPED | OUTPATIENT
Start: 2022-05-03 | End: 2022-08-04

## 2022-05-03 RX ORDER — AMLODIPINE BESYLATE 10 MG/1
10 TABLET ORAL DAILY
Qty: 90 TABLET | Refills: 1 | Status: SHIPPED | OUTPATIENT
Start: 2022-05-03 | End: 2022-08-04

## 2022-05-11 DIAGNOSIS — I10 ESSENTIAL HYPERTENSION, BENIGN: ICD-10-CM

## 2022-05-12 RX ORDER — LOSARTAN POTASSIUM 100 MG/1
100 TABLET ORAL DAILY
Qty: 90 TABLET | Refills: 1 | Status: SHIPPED | OUTPATIENT
Start: 2022-05-12 | End: 2022-09-07 | Stop reason: SDUPTHER

## 2022-06-13 ENCOUNTER — OFFICE VISIT (OUTPATIENT)
Dept: FAMILY MEDICINE CLINIC | Age: 72
End: 2022-06-13
Payer: MEDICARE

## 2022-06-13 VITALS
BODY MASS INDEX: 33.84 KG/M2 | HEART RATE: 82 BPM | WEIGHT: 191 LBS | SYSTOLIC BLOOD PRESSURE: 140 MMHG | OXYGEN SATURATION: 98 % | HEIGHT: 63 IN | DIASTOLIC BLOOD PRESSURE: 78 MMHG

## 2022-06-13 DIAGNOSIS — Z00.00 MEDICARE ANNUAL WELLNESS VISIT, SUBSEQUENT: Primary | ICD-10-CM

## 2022-06-13 DIAGNOSIS — E11.9 CONTROLLED TYPE 2 DIABETES MELLITUS WITHOUT COMPLICATION, WITHOUT LONG-TERM CURRENT USE OF INSULIN (HCC): ICD-10-CM

## 2022-06-13 DIAGNOSIS — E55.9 VITAMIN D DEFICIENCY: ICD-10-CM

## 2022-06-13 DIAGNOSIS — Z13.29 SCREENING FOR THYROID DISORDER: ICD-10-CM

## 2022-06-13 LAB — HBA1C MFR BLD: 7 %

## 2022-06-13 PROCEDURE — G0439 PPPS, SUBSEQ VISIT: HCPCS | Performed by: NURSE PRACTITIONER

## 2022-06-13 PROCEDURE — 3017F COLORECTAL CA SCREEN DOC REV: CPT | Performed by: NURSE PRACTITIONER

## 2022-06-13 PROCEDURE — 3051F HG A1C>EQUAL 7.0%<8.0%: CPT | Performed by: NURSE PRACTITIONER

## 2022-06-13 PROCEDURE — 1123F ACP DISCUSS/DSCN MKR DOCD: CPT | Performed by: NURSE PRACTITIONER

## 2022-06-13 PROCEDURE — 83036 HEMOGLOBIN GLYCOSYLATED A1C: CPT | Performed by: NURSE PRACTITIONER

## 2022-06-13 ASSESSMENT — PATIENT HEALTH QUESTIONNAIRE - PHQ9
8. MOVING OR SPEAKING SO SLOWLY THAT OTHER PEOPLE COULD HAVE NOTICED. OR THE OPPOSITE, BEING SO FIGETY OR RESTLESS THAT YOU HAVE BEEN MOVING AROUND A LOT MORE THAN USUAL: 0
SUM OF ALL RESPONSES TO PHQ QUESTIONS 1-9: 2
6. FEELING BAD ABOUT YOURSELF - OR THAT YOU ARE A FAILURE OR HAVE LET YOURSELF OR YOUR FAMILY DOWN: 0
SUM OF ALL RESPONSES TO PHQ QUESTIONS 1-9: 2
10. IF YOU CHECKED OFF ANY PROBLEMS, HOW DIFFICULT HAVE THESE PROBLEMS MADE IT FOR YOU TO DO YOUR WORK, TAKE CARE OF THINGS AT HOME, OR GET ALONG WITH OTHER PEOPLE: 0
2. FEELING DOWN, DEPRESSED OR HOPELESS: 0
SUM OF ALL RESPONSES TO PHQ9 QUESTIONS 1 & 2: 0
7. TROUBLE CONCENTRATING ON THINGS, SUCH AS READING THE NEWSPAPER OR WATCHING TELEVISION: 0
SUM OF ALL RESPONSES TO PHQ QUESTIONS 1-9: 2
SUM OF ALL RESPONSES TO PHQ QUESTIONS 1-9: 2
4. FEELING TIRED OR HAVING LITTLE ENERGY: 1
9. THOUGHTS THAT YOU WOULD BE BETTER OFF DEAD, OR OF HURTING YOURSELF: 0
3. TROUBLE FALLING OR STAYING ASLEEP: 1
5. POOR APPETITE OR OVEREATING: 0
1. LITTLE INTEREST OR PLEASURE IN DOING THINGS: 0

## 2022-06-13 ASSESSMENT — LIFESTYLE VARIABLES
HOW OFTEN DO YOU HAVE A DRINK CONTAINING ALCOHOL: MONTHLY OR LESS
HOW MANY STANDARD DRINKS CONTAINING ALCOHOL DO YOU HAVE ON A TYPICAL DAY: 1 OR 2

## 2022-06-13 NOTE — PROGRESS NOTES
Medicare Annual Wellness Visit    Ethan Castro is here for Medicare AWV (HTN, DM, anxiety, depression. Pt states her fasting BS this morning was 176)    Assessment & Plan   Medicare annual wellness visit, subsequent  Controlled type 2 diabetes mellitus without complication, without long-term current use of insulin (HCC)  -     POCT glycosylated hemoglobin (Hb A1C)  -     Lipid Panel; Future  -     Comprehensive Metabolic Panel; Future  Vitamin D deficiency  -     Vitamin D 25 Hydroxy; Future  Screening for thyroid disorder  -     TSH with Reflex; Future      Recommendations for Preventive Services Due: see orders and patient instructions/AVS.  Recommended screening schedule for the next 5-10 years is provided to the patient in written form: see Patient Instructions/AVS.     Return for Medicare Annual Wellness Visit in 1 year. Subjective   The following acute and/or chronic problems were also addressed today:  Type 2 DM       Patient's complete Health Risk Assessment and screening values have been reviewed and are found in Flowsheets. The following problems were reviewed today and where indicated follow up appointments were made and/or referrals ordered.     Positive Risk Factor Screenings with Interventions:    Fall Risk:  Do you feel unsteady or are you worried about falling? : (!) yes  2 or more falls in past year?: no  Fall with injury in past year?: no     Fall Risk Interventions:    · Home safety tips provided             Health Habits/Nutrition:     Physical Activity: Inactive    Days of Exercise per Week: 0 days    Minutes of Exercise per Session: 0 min     Have you lost any weight without trying in the past 3 months?: No  Body mass index: (!) 33.83  Have you seen the dentist within the past year?: Yes    Health Habits/Nutrition Interventions:  · Inadequate physical activity:  educational materials provided to promote increased physical activity  · Nutritional issues:  educational materials for healthy, well-balanced diet provided  · Dental exam overdue:  patient encouraged to make appointment with his/her dentist     Safety:  Do you have working smoke detectors?: Yes  Do you have any tripping hazards - loose or unsecured carpets or rugs?: (!) Yes  Do you have any tripping hazards - clutter in doorways, halls, or stairs?: No  Do you have either shower bars, grab bars, non-slip mats or non-slip surfaces in your shower or bathtub?: Yes  Do all of your stairways have a railing or banister?: Yes  Do you always fasten your seatbelt when you are in a car?: Yes    Safety Interventions:  · Home safety tips provided           Objective   Vitals:    06/13/22 1057 06/13/22 1114   BP: (!) 144/80 (!) 140/78   Site: Left Upper Arm Left Upper Arm   Pulse: 82    SpO2: 98%    Weight: 191 lb (86.6 kg)    Height: 5' 3\" (1.6 m)       Body mass index is 33.83 kg/m².         General Appearance: alert and oriented to person, place and time, well developed and well- nourished, in no acute distress  Skin: warm and dry, no rash or erythema  Head: normocephalic and atraumatic  Eyes: pupils equal, round, and reactive to light, extraocular eye movements intact, conjunctivae normal  ENT: tympanic membrane, external ear and ear canal normal bilaterally  Neck: supple and non-tender without mass, no thyromegaly or thyroid nodules, no cervical lymphadenopathy  Pulmonary/Chest: clear to auscultation bilaterally- no wheezes, rales or rhonchi, normal air movement, no respiratory distress  Cardiovascular: normal rate, regular rhythm, normal S1 and S2, no murmurs, rubs, clicks, or gallops, distal pulses intact, no carotid bruits  Abdomen: soft, non-tender, non-distended, normal bowel sounds,   Extremities: no cyanosis, clubbing or edema  Musculoskeletal: normal range of motion, no joint swelling, deformity or tenderness  Neurologic:  gait, coordination and speech normal       Allergies   Allergen Reactions    Penicillins Hives    Dust Mite Extract Other (See Comments)     headache     Prior to Visit Medications    Medication Sig Taking? Authorizing Provider   losartan (COZAAR) 100 MG tablet TAKE 1 TABLET BY MOUTH DAILY for hypertension/kidney protection with diabetes Yes DANA Valencia CNP   venlafaxine (EFFEXOR XR) 75 MG extended release capsule Take 1 capsule by mouth daily Yes DANA Valencia CNP   atorvastatin (LIPITOR) 20 MG tablet Take 1 tablet by mouth daily Yes DANA Valencia CNP   amLODIPine (NORVASC) 10 MG tablet Take 1 tablet by mouth daily Yes DANA Valencia CNP   Cholecalciferol (VITAMIN D3) 125 MCG (5000 UT) TABS Take by mouth daily Yes Historical Provider, MD   sitaGLIPtan-metFORMIN (JANUMET)  MG per tablet Take 1 tablet by mouth daily (with breakfast) Yes DANA Valencia CNP   Calcium Carbonate-Vitamin D (CALCIUM PLUS VITAMIN D) 500-50 MG-UNIT CAPS Take 1 capsule by mouth daily Yes DANA Valencia CNP   TGT PSYLLIUM FIBER PO Take 2 capsules by mouth daily Yes Historical Provider, MD   aspirin 81 MG tablet Take 81 mg by mouth daily Yes Historical Provider, MD   clindamycin (CLEOCIN) 300 MG capsule Take 2 capsules by mouth once for 1 dose Prior to dental procedure  Patient not taking: Reported on 4/11/2022  Historical Provider, MD   blood glucose test strips (ASCENSIA AUTODISC VI;ONE TOUCH ULTRA TEST VI) strip Test sugar Bid and as needed Dx E 11.9  DANA Valencia CNP   Lancets MISC Testing Blood sugar once daily and as needed. DANA Valencia CNP       CareTeam (Including outside providers/suppliers regularly involved in providing care):   Patient Care Team:  DANA Valencia CNP as PCP - General (Nurse Practitioner)  DANA Valencia CNP as PCP - [facility] Empaneled Provider  Destiny Smith RN as Registered Nurse     Reviewed and updated this visit:  Tobacco  Allergies  Meds  Problems  Med Hx  Surg Hx  Soc Hx  Fam Hx       1.  Medicare annual wellness visit, subsequent      2. Controlled type 2 diabetes mellitus without complication, without long-term current use of insulin (HCC)    - POCT glycosylated hemoglobin (Hb A1C)  - Lipid Panel; Future  - Comprehensive Metabolic Panel; Future    3. Vitamin D deficiency    - Vitamin D 25 Hydroxy; Future    4. Screening for thyroid disorder    - TSH with Reflex;  Future

## 2022-06-13 NOTE — PATIENT INSTRUCTIONS
You may be receiving a survey from [employer] regarding your visit today. You may get this in the mail, through your MyChart or in your email. Please complete the survey to enable us to provide the highest quality of care to you and your family. If you cannot score us as very good ( 5 Stars) on any question, please feel free to call the office to discuss how we could have made your experience exceptional.     Thank You! Santillan BronwynDANA-CYNDI  [address] Nivia Krishnamurthy LPN        Phone: [phone]  Fax: [phone] Office Hours:  Monday: [place] office location 8-5 ([phone]) Offering additional late hours the first Monday of the month until 7 pm.   Tuesday: 8-5 Wednesday: 8-5 Thursday:  Additional hours offered 2 Thursdays a month. Please call to inquire those dates. Fridays: 7:30-4:30    Personalized Preventive Plan for Veda Elise - 6/13/2022  Medicare offers a range of preventive health benefits. Some of the tests and screenings are paid in full while other may be subject to a deductible, co-insurance, and/or copay. Some of these benefits include a comprehensive review of your medical history including lifestyle, illnesses that may run in your family, and various assessments and screenings as appropriate. After reviewing your medical record and screening and assessments performed today your provider may have ordered immunizations, labs, imaging, and/or referrals for you. A list of these orders (if applicable) as well as your Preventive Care list are included within your After Visit Summary for your review. Other Preventive Recommendations:    · A preventive eye exam performed by an eye specialist is recommended every 1-2 years to screen for glaucoma; cataracts, macular degeneration, and other eye disorders. · A preventive dental visit is recommended every 6 months.   · Try to get at least 150 minutes of exercise per week or 10,000 steps per day on a pedometer . · Order or download the FREE \"Exercise & Physical Activity: Your Everyday Guide\" from [employer]. Call 4-866.295.7514 or search [employer] online. · You need 9631-9339 mg of calcium and 4698-0258 IU of vitamin D per day. It is possible to meet your calcium requirement with diet alone, but a vitamin D supplement is usually necessary to meet this goal.  · When exposed to the sun, use a sunscreen that protects against both UVA and UVB radiation with an SPF of 30 or greater. Reapply every 2 to 3 hours or after sweating, drying off with a towel, or swimming. · Always wear a seat belt when traveling in a car. Always wear a helmet when riding a bicycle or motorcycle.

## 2022-08-02 DIAGNOSIS — I10 ESSENTIAL HYPERTENSION, BENIGN: ICD-10-CM

## 2022-08-02 DIAGNOSIS — E78.00 PURE HYPERCHOLESTEROLEMIA: ICD-10-CM

## 2022-08-02 DIAGNOSIS — F32.A ANXIETY AND DEPRESSION: ICD-10-CM

## 2022-08-02 DIAGNOSIS — F41.9 ANXIETY AND DEPRESSION: ICD-10-CM

## 2022-08-04 RX ORDER — VENLAFAXINE HYDROCHLORIDE 75 MG/1
75 CAPSULE, EXTENDED RELEASE ORAL DAILY
Qty: 90 CAPSULE | Refills: 1 | Status: SHIPPED | OUTPATIENT
Start: 2022-08-04

## 2022-08-04 RX ORDER — AMLODIPINE BESYLATE 10 MG/1
10 TABLET ORAL DAILY
Qty: 90 TABLET | Refills: 1 | Status: SHIPPED | OUTPATIENT
Start: 2022-08-04

## 2022-08-04 RX ORDER — ATORVASTATIN CALCIUM 20 MG/1
20 TABLET, FILM COATED ORAL DAILY
Qty: 90 TABLET | Refills: 1 | Status: SHIPPED | OUTPATIENT
Start: 2022-08-04

## 2022-08-08 ENCOUNTER — TELEPHONE (OUTPATIENT)
Dept: FAMILY MEDICINE CLINIC | Age: 72
End: 2022-08-08

## 2022-08-08 NOTE — TELEPHONE ENCOUNTER
----- Message from Pat Ashraf sent at 8/8/2022 10:01 AM EDT -----  Subject: Appointment Request    Reason for Call: Established Patient Appointment needed: Routine Existing   Condition Follow Up    QUESTIONS    Reason for appointment request? Available appointments did not meet   patient need     Additional Information for Provider? Pt said she needs to be seen soon by   her provider soon. Pt needs to discuss her medication. Pt needs to be seen   before she has to renew her medication that's why she needs to be seen   soon. She is trying to get back on metformin. The pt wants to know should   she call and cancel her janumt since she is trying to get her metformin   back.   ---------------------------------------------------------------------------  --------------  Brianna CHAPPELL  [phone]; OK to leave message on voicemail  ---------------------------------------------------------------------------  --------------  SCRIPT ANSWERS  COVID Screen: Yue Pascal

## 2022-08-29 ENCOUNTER — OFFICE VISIT (OUTPATIENT)
Dept: FAMILY MEDICINE CLINIC | Age: 72
End: 2022-08-29
Payer: MEDICARE

## 2022-08-29 VITALS
OXYGEN SATURATION: 98 % | BODY MASS INDEX: 34.91 KG/M2 | HEIGHT: 63 IN | DIASTOLIC BLOOD PRESSURE: 78 MMHG | WEIGHT: 197 LBS | SYSTOLIC BLOOD PRESSURE: 138 MMHG | HEART RATE: 88 BPM

## 2022-08-29 DIAGNOSIS — F41.8 ANXIETY ABOUT HEALTH: Primary | ICD-10-CM

## 2022-08-29 DIAGNOSIS — E11.9 CONTROLLED TYPE 2 DIABETES MELLITUS WITHOUT COMPLICATION, WITHOUT LONG-TERM CURRENT USE OF INSULIN (HCC): ICD-10-CM

## 2022-08-29 DIAGNOSIS — M85.851 OSTEOPENIA OF BOTH HIPS: ICD-10-CM

## 2022-08-29 DIAGNOSIS — Z59.89 INSURANCE COVERAGE PROBLEMS: ICD-10-CM

## 2022-08-29 DIAGNOSIS — I10 ESSENTIAL HYPERTENSION, BENIGN: ICD-10-CM

## 2022-08-29 DIAGNOSIS — M85.852 OSTEOPENIA OF BOTH HIPS: ICD-10-CM

## 2022-08-29 PROCEDURE — G8399 PT W/DXA RESULTS DOCUMENT: HCPCS | Performed by: NURSE PRACTITIONER

## 2022-08-29 PROCEDURE — G8427 DOCREV CUR MEDS BY ELIG CLIN: HCPCS | Performed by: NURSE PRACTITIONER

## 2022-08-29 PROCEDURE — 3051F HG A1C>EQUAL 7.0%<8.0%: CPT | Performed by: NURSE PRACTITIONER

## 2022-08-29 PROCEDURE — 99215 OFFICE O/P EST HI 40 MIN: CPT | Performed by: NURSE PRACTITIONER

## 2022-08-29 PROCEDURE — 1036F TOBACCO NON-USER: CPT | Performed by: NURSE PRACTITIONER

## 2022-08-29 PROCEDURE — 3017F COLORECTAL CA SCREEN DOC REV: CPT | Performed by: NURSE PRACTITIONER

## 2022-08-29 PROCEDURE — 1090F PRES/ABSN URINE INCON ASSESS: CPT | Performed by: NURSE PRACTITIONER

## 2022-08-29 PROCEDURE — 2022F DILAT RTA XM EVC RTNOPTHY: CPT | Performed by: NURSE PRACTITIONER

## 2022-08-29 PROCEDURE — G8417 CALC BMI ABV UP PARAM F/U: HCPCS | Performed by: NURSE PRACTITIONER

## 2022-08-29 PROCEDURE — 1123F ACP DISCUSS/DSCN MKR DOCD: CPT | Performed by: NURSE PRACTITIONER

## 2022-08-29 SDOH — ECONOMIC STABILITY: FOOD INSECURITY: WITHIN THE PAST 12 MONTHS, YOU WORRIED THAT YOUR FOOD WOULD RUN OUT BEFORE YOU GOT MONEY TO BUY MORE.: NEVER TRUE

## 2022-08-29 SDOH — ECONOMIC STABILITY - INCOME SECURITY: OTHER PROBLEMS RELATED TO HOUSING AND ECONOMIC CIRCUMSTANCES: Z59.89

## 2022-08-29 SDOH — ECONOMIC STABILITY: FOOD INSECURITY: WITHIN THE PAST 12 MONTHS, THE FOOD YOU BOUGHT JUST DIDN'T LAST AND YOU DIDN'T HAVE MONEY TO GET MORE.: NEVER TRUE

## 2022-08-29 ASSESSMENT — SOCIAL DETERMINANTS OF HEALTH (SDOH): HOW HARD IS IT FOR YOU TO PAY FOR THE VERY BASICS LIKE FOOD, HOUSING, MEDICAL CARE, AND HEATING?: NOT HARD AT ALL

## 2022-08-29 NOTE — PATIENT INSTRUCTIONS
Phone: [phone]  Fax: [facility] Office Hours:  Monday: [place] office location 8-5 ([phone]) Offering additional late hours the first Monday of the month until 7 pm.   Tuesday: 8-5 Wednesday: 8-5 Thursday:  Additional hours offered 2 Thursdays a month. Please call to inquire those dates. Fridays: 7:30-4:30   SURVEY:    You may be receiving a survey from [employer] regarding your visit today. Please complete the survey to enable us to provide the highest quality of care to you and your family. If you cannot score us a very good on any question, please call the office to discuss how we could have made your experience a very good one. Thank you. DANA Crump - CNP, medication review completed with patient:  - I called patient's insurance for DM med costs. No affordable options for DPP4, SGLTs, combo's with metformin, GLP1  - Only affordable options are older DM meds such as:  Actos 15 mg- $17 for 90ds  Glipizide 5 mg BID- $2.19 for 30 ds, $4.32   - insurance company said these are estimate costs. Patient does have $4100 deductible for meds and still has $3800 to meet her deductible. I do not see her every meeting this medication deductible. It seems she picked a low cost premium high deductible med plan. See note below for complete details.       Thank you,  Sandi Benavides, PharmD, [address] Pharmacist  Department: [phone]

## 2022-08-29 NOTE — PROGRESS NOTES
[facility]  [address] [place] [address] [facility]  [address] [place] [id number]  Dept: [phone]  Dept Fax: [phone]    Last encounter 6/13/2022    Diabetes (Pt states she checks her sugar, but not regularly (forgot numbers at home today). Pt is wanting to go back on metformin BID due to insurance issues with [place])       HPI:   John Meredith is a 71 y.o. female who presentstoday for her medical conditions/complaints as noted below. John Meredith is c/o of Diabetes (Pt states she checks her sugar, but not regularly (forgot numbers at home today). Pt is wanting to go back on metformin BID due to insurance issues with [place])      HPI  Established patient      Pt here with confusion of medication and coverage. She has called several times on the phone feeling like her medication needs changed. Done 9/2021 OV note. DANA Perry - CNP, medication review completed with patient:  - I called patient's insurance for DM med costs. No affordable options for DPP4, SGLTs, combo's with metformin, GLP1  - Only affordable options are older DM meds such as:  Actos 15 mg- $17 for 90ds  Glipizide 5 mg BID- $2.19 for 30 ds, $4.32   - insurance company said these are estimate costs. Patient does have $4100 deductible for meds and still has $3800 to meet her deductible. I do not see her every meeting this medication deductible. It seems she picked a low cost premium high deductible med plan. -  Patient wanted to know if she should  samples from your office, you will prescribe cheaper med, or she can try lifestyle changes     See note below for complete details. Thank you,  Gucci Rachel, PharmD, [address] Pharmacist  Department: [phone]        Janumet  taking once in am only doing well. Pt actually is getting this from a Pharmacy assistance program PAP , this medication is over $1000 for pt to get through her insurance. Out of pocket cost for patient would be $563.11    Pt seems astounded at this cost, but at start of visit stated \"I want to get rid of this medication\"     Patient says several different insurance card she is not sure which 1 is active we tried both the 1 for silver prescription and [employer] finding that she has [employer] coverage and were able to go through on the phone during the visit which states an extensive amount of time to get through to go over each drug for the patient and her cost both local and mail-in    Diabetic care and different medications that are possible from that overall patient feels she is doing okay but she is coming into the open enrollment period of her Medicare part D insurance    I have encouraged her involve her son in her decision making power and also have a list of her medications available to her so she can make the best decision to help be cost effective for her ultimately her biggest concern today in addition to her best health    Spoke with patient and 2 different insurance carriers including [employer] while on the phone with patient present the entire time for 1 hour and 6 minutes    Feels she has much better understanding of this detailed information she is aware of that she has a high deductible and she will continue to get the medication through the pharmacy assistance program    Lab Results   Component Value Date    LABA1C 7.0 06/13/2022     Lab Results   Component Value Date     02/02/2022             Sugar checking in am. 1-2 times a week  Lab Results   Component Value Date    LABA1C 7.0 06/13/2022     Lab Results   Component Value Date     02/02/2022       Reviewed prior notes None  Reviewed previous Labs, Imaging, and Hospital Records    Past Medical History:   Diagnosis Date    Anxiety     Hyperlipidemia     Hypertension     onset early 16'B    Lichen simplex 0096    shoulders and right leg    Osteopenia 04/2011    femoral necks jean -1.9, -1.8 Past Surgical History:   Procedure Laterality Date    CHOLECYSTECTOMY      age 35    COLON SURGERY      colon resection bowel blockage/adhesions-gangrene    COLONOSCOPY  2016    Mikey GORDON    COLONOSCOPY N/A 2021    COLONOSCOPY  repeat in 2 years , result polypectomy Richard Landrum MD at [address]      x3 pulled    HYSTERECTOMY ([address])      kept ovaries       Family History   Problem Relation Age of Onset    Hypertension Mother     Dementia Mother     Hypertension Father     Heart Disease Father         stroke during heart surgery     High Cholesterol Father     Heart Disease Brother         cath with stents    Heart Attack Maternal Grandmother     Other Paternal Grandmother         cerbral aneurysm    Alcohol Abuse Brother     Cancer Brother         kidney       Social History     Tobacco Use    Smoking status: Never    Smokeless tobacco: Never    Tobacco comments:     hx smoking-6 years 1/2 ppd or less   Substance Use Topics    Alcohol use: No      Current Outpatient Medications   Medication Sig Dispense Refill    losartan (COZAAR) 100 MG tablet Take 1 tablet by mouth daily For hypertension/kidney protection with diabetes 90 tablet 1    atorvastatin (LIPITOR) 20 MG tablet TAKE 1 TABLET BY MOUTH DAILY 90 tablet 1    amLODIPine (NORVASC) 10 MG tablet Take 1 tablet by mouth daily 90 tablet 1    venlafaxine (EFFEXOR XR) 75 MG extended release capsule TAKE 1 CAPSULE BY MOUTH DAILY 90 capsule 1    Cholecalciferol (VITAMIN D3) 125 MCG (5000 UT) TABS Take by mouth daily      sitaGLIPtan-metFORMIN (JANUMET)  MG per tablet Take 1 tablet by mouth daily (with breakfast) 14 tablet 0    Calcium Carbonate-Vitamin D (CALCIUM PLUS VITAMIN D) 500-50 MG-UNIT CAPS Take 1 capsule by mouth daily 60 capsule     TGT PSYLLIUM FIBER PO Take 2 capsules by mouth daily      aspirin 81 MG tablet Take 81 mg by mouth daily      clindamycin (CLEOCIN) 300 MG capsule Take 2 capsules by mouth once for 1 dose Prior to dental procedure (Patient not taking: No sig reported)      blood glucose test strips (ASCENSIA AUTODISC VI;ONE TOUCH ULTRA TEST VI) strip Test sugar Bid and as needed Dx E 11.9 100 each 3    Lancets MISC Testing Blood sugar once daily and as needed. 100 each 1     No current facility-administered medications for this visit. Allergies   Allergen Reactions    Penicillins Hives    Dust Mite Extract Other (See Comments)     headache       Health Maintenance   Topic Date Due    Shingles vaccine (2 of 3) 05/26/2014    Diabetic foot exam  03/08/2022    Flu vaccine (1) 09/01/2022    Diabetic microalbuminuria test  02/02/2023    Lipids  02/02/2023    Colorectal Cancer Screen  03/22/2023    Diabetic retinal exam  04/05/2023    A1C test (Diabetic or Prediabetic)  06/13/2023    Depression Monitoring  06/13/2023    Annual Wellness Visit (AWV)  06/14/2023    Breast cancer screen  08/10/2023    DTaP/Tdap/Td vaccine (2 - Td or Tdap) 02/19/2032    DEXA (modify frequency per FRAX score)  Completed    Pneumococcal 65+ years Vaccine  Completed    COVID-19 Vaccine  Completed    Hepatitis C screen  Completed    Hepatitis A vaccine  Aged Out    Hib vaccine  Aged Out    Meningococcal (ACWY) vaccine  Aged Out       Subjective:      Review of Systems   Constitutional:  Negative for activity change, chills and fever. HENT:  Negative for congestion, ear pain and sore throat. Eyes: Negative. Respiratory:  Negative for cough and chest tightness. Cardiovascular:  Negative for chest pain and leg swelling. Gastrointestinal:  Negative for abdominal distention and constipation. Endocrine: Negative for cold intolerance and heat intolerance. Genitourinary:  Negative for difficulty urinating and frequency. Musculoskeletal:  Negative for arthralgias. Skin:  Negative for rash. Objective:     Physical Exam  Vitals and nursing note reviewed.    Constitutional:       General: She is not in acute distress. Appearance: Normal appearance. She is well-developed. HENT:      Head: Normocephalic and atraumatic. Right Ear: Tympanic membrane and external ear normal.      Left Ear: Tympanic membrane and external ear normal.      Nose: No congestion. Mouth/Throat:      Mouth: Mucous membranes are moist.   Eyes:      General: No scleral icterus. Pupils: Pupils are equal, round, and reactive to light. Neck:      Vascular: No carotid bruit (neg jean). Cardiovascular:      Rate and Rhythm: Normal rate and regular rhythm. Heart sounds: Normal heart sounds. No murmur heard. Pulmonary:      Effort: Pulmonary effort is normal. No respiratory distress. Breath sounds: Normal breath sounds. No wheezing. Abdominal:      Palpations: Abdomen is soft. Tenderness: There is no abdominal tenderness. Musculoskeletal:         General: Normal range of motion. Cervical back: Normal range of motion and neck supple. Right lower leg: No edema. Left lower leg: No edema. Lymphadenopathy:      Cervical: No cervical adenopathy. Skin:     General: Skin is warm and dry. Findings: No rash. Neurological:      Mental Status: She is alert and oriented to person, place, and time. Psychiatric:         Behavior: Behavior normal.         Thought Content:  Thought content normal.         Judgment: Judgment normal.      Comments: Good eye contact future oriented     /78   Pulse 88   Ht 5' 3\" (1.6 m)   Wt 197 lb (89.4 kg)   LMP  (LMP Unknown) Comment: partial  SpO2 98%   BMI 34.90 kg/m²     Data:     Lab Results   Component Value Date/Time     02/02/2022 08:13 AM    K 4.1 02/02/2022 08:13 AM     02/02/2022 08:13 AM    CO2 20 02/02/2022 08:13 AM    BUN 18 02/02/2022 08:13 AM    CREATININE 0.68 02/02/2022 08:13 AM    GLUCOSE 136 02/02/2022 08:13 AM    GLUCOSE 179 04/12/2012 07:59 AM    PROT 7.1 02/02/2022 08:13 AM    LABALBU 3.5 02/02/2022 08:13 AM spent 66  minutes reviewing previous notes, test results and face to face with the patient discussing the diagnosis and importance of compliance with the treatment plan as well as documenting on the day of the visit.      Electronically signed by DANA Olmos CNP on 9/7/2022 at 11:17 PM

## 2022-09-07 RX ORDER — LOSARTAN POTASSIUM 100 MG/1
100 TABLET ORAL DAILY
Qty: 90 TABLET | Refills: 1 | Status: SHIPPED | OUTPATIENT
Start: 2022-09-07

## 2022-09-07 ASSESSMENT — ENCOUNTER SYMPTOMS
ABDOMINAL DISTENTION: 0
COUGH: 0
EYES NEGATIVE: 1
CHEST TIGHTNESS: 0
CONSTIPATION: 0
SORE THROAT: 0

## 2022-09-19 ENCOUNTER — NURSE ONLY (OUTPATIENT)
Dept: FAMILY MEDICINE CLINIC | Age: 72
End: 2022-09-19
Payer: MEDICARE

## 2022-09-19 DIAGNOSIS — E11.9 TYPE 2 DIABETES, HBA1C GOAL < 7% (HCC): Primary | ICD-10-CM

## 2022-09-19 LAB — HBA1C MFR BLD: 7.7 %

## 2022-09-19 PROCEDURE — 83036 HEMOGLOBIN GLYCOSYLATED A1C: CPT | Performed by: NURSE PRACTITIONER

## 2022-09-20 RX ORDER — ACARBOSE 25 MG/1
25 TABLET ORAL
Qty: 30 TABLET | Refills: 1 | Status: SHIPPED | OUTPATIENT
Start: 2022-09-20

## 2022-10-14 ENCOUNTER — HOSPITAL ENCOUNTER (OUTPATIENT)
Age: 72
Discharge: HOME | End: 2022-10-14
Payer: MEDICARE

## 2022-10-14 DIAGNOSIS — E11.9 CONTROLLED TYPE 2 DIABETES MELLITUS WITHOUT COMPLICATION, WITHOUT LONG-TERM CURRENT USE OF INSULIN (HCC): ICD-10-CM

## 2022-10-14 DIAGNOSIS — E55.9 VITAMIN D DEFICIENCY: ICD-10-CM

## 2022-10-14 DIAGNOSIS — Z13.29 SCREENING FOR THYROID DISORDER: ICD-10-CM

## 2022-10-14 LAB
ALBUMIN SERPL-MCNC: 4 G/DL (ref 3.5–5.2)
ALP BLD-CCNC: 123 U/L (ref 35–104)
ALT SERPL-CCNC: 16 U/L (ref 5–33)
ANION GAP SERPL CALCULATED.3IONS-SCNC: 10 MMOL/L (ref 9–17)
AST SERPL-CCNC: 16 U/L
BILIRUB SERPL-MCNC: 0.3 MG/DL (ref 0.3–1.2)
BUN BLDV-MCNC: 14 MG/DL (ref 8–23)
BUN/CREAT BLD: 21 (ref 9–20)
CALCIUM SERPL-MCNC: 9.3 MG/DL (ref 8.6–10.4)
CHLORIDE BLD-SCNC: 105 MMOL/L (ref 98–107)
CHOLESTEROL/HDL RATIO: 3.7
CHOLESTEROL: 152 MG/DL
CO2: 25 MMOL/L (ref 20–31)
CREAT SERPL-MCNC: 0.66 MG/DL (ref 0.5–0.9)
GFR SERPL CREATININE-BSD FRML MDRD: >60 ML/MIN/1.73M2
GLUCOSE BLD-MCNC: 183 MG/DL (ref 70–99)
HDLC SERPL-MCNC: 41 MG/DL
LDL CHOLESTEROL: 84 MG/DL (ref 0–130)
PATIENT FASTING?: YES
POTASSIUM SERPL-SCNC: 3.9 MMOL/L (ref 3.7–5.3)
SODIUM BLD-SCNC: 140 MMOL/L (ref 135–144)
TOTAL PROTEIN: 7.3 G/DL (ref 6.4–8.3)
TRIGL SERPL-MCNC: 135 MG/DL
TSH SERPL DL<=0.05 MIU/L-ACNC: 2.39 UIU/ML (ref 0.3–5)
VITAMIN D 25-HYDROXY: 51.7 NG/ML

## 2022-10-14 PROCEDURE — 80061 LIPID PANEL: CPT

## 2022-10-14 PROCEDURE — 82306 VITAMIN D 25 HYDROXY: CPT

## 2022-10-14 PROCEDURE — 84443 ASSAY THYROID STIM HORMONE: CPT

## 2022-10-14 PROCEDURE — 80053 COMPREHEN METABOLIC PANEL: CPT

## 2022-10-14 PROCEDURE — 36415 COLL VENOUS BLD VENIPUNCTURE: CPT

## 2022-10-17 ENCOUNTER — TELEPHONE (OUTPATIENT)
Dept: FAMILY MEDICINE CLINIC | Age: 72
End: 2022-10-17

## 2022-10-17 DIAGNOSIS — Z12.31 SCREENING MAMMOGRAM, ENCOUNTER FOR: Primary | ICD-10-CM

## 2022-10-17 NOTE — TELEPHONE ENCOUNTER
Patient asking for an order for her mammogram - patient will call scheduling tomorrow to schedule    Health Maintenance   Topic Date Due    Shingles vaccine (2 of 3) 05/26/2014    Diabetic foot exam  03/08/2022    Flu vaccine (1) 08/01/2022    Diabetic microalbuminuria test  02/02/2023    Colorectal Cancer Screen  03/22/2023    Diabetic retinal exam  04/05/2023    Depression Monitoring  06/13/2023    Annual Wellness Visit (AWV)  06/14/2023    Breast cancer screen  08/10/2023    A1C test (Diabetic or Prediabetic)  09/19/2023    Lipids  10/14/2023    DTaP/Tdap/Td vaccine (2 - Td or Tdap) 02/19/2032    DEXA (modify frequency per FRAX score)  Completed    Pneumococcal 65+ years Vaccine  Completed    COVID-19 Vaccine  Completed    Hepatitis C screen  Completed    Hepatitis A vaccine  Aged Out    Hib vaccine  Aged Out    Meningococcal (ACWY) vaccine  Aged Out             (applicable per patient's age: Cancer Screenings, Depression Screening, Fall Risk Screening, Immunizations)    Hemoglobin A1C (%)   Date Value   09/19/2022 7.7   06/13/2022 7.0   02/02/2022 6.4 (H)     Microalb/Crt.  Ratio (mcg/mg creat)   Date Value   02/02/2022 6     LDL Cholesterol (mg/dL)   Date Value   10/14/2022 84     AST (U/L)   Date Value   10/14/2022 16     ALT (U/L)   Date Value   10/14/2022 16     BUN (mg/dL)   Date Value   10/14/2022 14      (goal A1C is < 7)   (goal LDL is <100) need 30-50% reduction from baseline     BP Readings from Last 3 Encounters:   08/29/22 138/78   06/13/22 (!) 140/78   04/11/22 (!) 140/78    (goal /80)      All Future Testing planned in [facility]:  Lab Frequency Next Occurrence       Next Visit Date:  Future Appointments   Date Time Provider Soha Rodriguez   2/20/2023 11:00 AM [facility]            Patient Active Problem List:     Type 2 diabetes, HbA1c goal < 7% ([address]     Pure hypercholesterolemia     Essential hypertension, benign     Senile osteoporosis     Anxiety state Dermatophytosis of foot     Type 2 diabetes mellitus with hyperglycemia     Osteopenia of both hips     Anxiety and depression     Diabetes mellitus due to underlying condition with complication, without long-term current use of insulin ([facility] [address]

## 2022-10-24 ENCOUNTER — TELEPHONE (OUTPATIENT)
Dept: FAMILY MEDICINE CLINIC | Age: 72
End: 2022-10-24

## 2022-10-24 NOTE — TELEPHONE ENCOUNTER
Patient called in requesting to speak to nurse about how to take Metformin and Acarbose.     Last OV:8/29/2022  Next OV: 2/20/2023     [phone]

## 2022-10-25 NOTE — TELEPHONE ENCOUNTER
Patient is on a PAP  - PRescription Assistance Program through the company that makes Januvia (sitagliptin). Which has provided her this medication in combo form of     Janumet  mg daily in am     The JAnuvia (sitagliptin) med itself costs pt $563.11 for a 3 month supply through her mail in pharmacy or $581.88 locally for same amount . So patient wants to give up $2,252.44 in benefit from this program? By not renewing her application. Her last A1C is 7.7 done in September. She needs to complete a new application and I asked her to add the precose with meals which will bring her sugar closer to goal due to some sugars 180-200 now. The precose is taken with meal and I asked her take it at [facility] (her evening meal) Once a day to start and cautioned her that it can cause gas (farts) . Check blood sugar 2 x daily am and one other time of the day for 1 week and call results to the office. Please have her complete application and bring to office so I can complete my part. I greatly caution that she does not give up this benefit. I did ask her son Austin Cameron to assist her with this due to her confusion.      Philomena Reyna

## 2022-10-25 NOTE — TELEPHONE ENCOUNTER
Spoke with pt. Pt states she finished the janumet and is wondering how she should take her Metformin and precose. On pt's last A1C result note 9/19/22, Pt was instructed to take Januemt along with precose.  Please clarify medications for pt

## 2022-10-28 ENCOUNTER — TELEPHONE (OUTPATIENT)
Dept: FAMILY MEDICINE CLINIC | Age: 72
End: 2022-10-28

## 2022-10-28 ENCOUNTER — SCHEDULED TELEPHONE ENCOUNTER (OUTPATIENT)
Dept: PRIMARY CARE CLINIC | Age: 72
End: 2022-10-28
Payer: MEDICARE

## 2022-10-28 DIAGNOSIS — E08.8 DIABETES MELLITUS DUE TO UNDERLYING CONDITION WITH COMPLICATION, WITHOUT LONG-TERM CURRENT USE OF INSULIN (HCC): Primary | ICD-10-CM

## 2022-10-28 DIAGNOSIS — Z91.130: ICD-10-CM

## 2022-10-28 PROCEDURE — G2025 DIS SITE TELE SVCS RHC/FQHC: HCPCS | Performed by: NURSE PRACTITIONER

## 2022-10-28 NOTE — TELEPHONE ENCOUNTER
Pt called to clarify how to finish her Metformin once in AM and once in PM with Acarbose. Or one Metformin in the AM and Acarbose in PM ? PT aware when she finishes Metformin to start [place].  Please advise

## 2022-10-28 NOTE — PROGRESS NOTES
Giulia Estrada is a 71 y.o. female evaluated via telephone on 10/28/2022 for No chief complaint on file. .        Documentation:  I communicated with the patient and/or health care decision maker about diabetes medication. Details of this discussion including any medical advice provided:     Pt had chose to not redo application to Pharmacy assistance program for janumet which saves her $2300 per year. Instead she is using metformin  1000 mg bid am and supper and precose 25 mg at supper only to see if that will help with control. States she is strictly limiting her diet again. (Goal if using precose will ramp up to 25 mg all 3 meals and then go to 50 mg dose with each meal unless sugar goal reached prior to that )      Pt went to diabetic education with friend. Pt has united healthcare medicare plan which she has to pay $4500 for all meds before any coverage is available. encouraged pt to have her son review her medicare chosen part D plan for drug coverage prior to open enrollment period ending. Pt feels she will try. Pt agreeable to have care coordinator help her with application PAP for janumet again. Pt denies having a computer to do it and son Santi Castañeda too busy with family to help, but Santi Castañeda is coming to next appt. 1. Diabetes mellitus due to underlying condition with complication, without long-term current use of insulin (HCC)  Hemoglobin A1C   Date Value Ref Range Status   09/19/2022 7.7 % Final       - metFORMIN (GLUCOPHAGE) 1000 MG tablet; Take 1 tablet by mouth 2 times daily (with meals)  Dispense: 60 tablet; Refill: 0    2. Pt unintent undrdose of meds regimen due to age-rel debility      Pt will track blood sugar numbers 2 x daily and call next week on Tuesday with result. Total Time: minutes: 11-20 minutes    Giulia Estrada was evaluated through a synchronous (real-time) audio encounter. Patient identification was verified at the start of the visit.  She (or guardian if applicable) is aware that this is a billable service, which includes applicable co-pays. This visit was conducted with the patient's (and/or legal guardian's) verbal consent. She has not had a related appointment within my department in the past 7 days or scheduled within the next 24 hours. The patient was located at Home: [address] [place] [address] Dr Tita Mckenna [id number]. The provider was located at Home ([place] [address]): [place].     Note: not billable if this call serves to triage the patient into an appointment for the relevant concern    Meli Zamudio, APRN - CNP

## 2022-10-29 NOTE — TELEPHONE ENCOUNTER
I spoke with patient again today for phone visit with her confusion. Pt had chose to not redo application to Pharmacy assistance program for janumet which saves her $2300 per year. Instead she is using metformin and precose to see if that will help with control. Pt went to diabetic education with friend. Pt has united healthcare medicare plan which she has to pay $4500 for all meds before any coverage is available.

## 2022-11-01 ENCOUNTER — CARE COORDINATION (OUTPATIENT)
Dept: CARE COORDINATION | Age: 72
End: 2022-11-01

## 2022-11-01 NOTE — CARE COORDINATION
Spoke briefly  with patient. Referred by PCP for care management because needs help with patient assistance application for [place]et. Currently doesn't have because she stopped using after last PAP . Currently will be taking metformin and Acarbose was just added as well, she is picking up from pharmacy right now. Instructed her to check blood sugars at least twice a day and PRN due to medication change. She was unable to talk long. Sent referral message to pharmacy  for help with PAP application. Will call again next week.     Future Appointments   Date Time Provider Soha Rodriguez   2022  9:00 AM [address] [facility]   2022  4:00 PM DANA Petty CNP [facility]   2023 11:00 AM DANA Petty CNP [facility]

## 2022-11-01 NOTE — CARE COORDINATION
Left VM message asking patient to call me back at [phone] for care management assessment. Will call again in a few days.     Future Appointments   Date Time Provider Soha Rodriguez   11/7/2022  9:00 AM [address] [facility]   11/14/2022  4:00 PM [facility]   2/20/2023 11:00 AM DANA Espinoza CNP [facility]

## 2022-11-02 ENCOUNTER — CARE COORDINATION (OUTPATIENT)
Dept: CARE COORDINATION | Age: 72
End: 2022-11-02

## 2022-11-02 NOTE — CARE COORDINATION
I was able to fax the provider the new applications for assistance into the PAP programs for 2023. Once I get these back I will be able to fax into the company.           [address] [place] [address]   Medication Assistance  [address] [place] [address] and [employer]    (S) [phone]  (O) [phone]

## 2022-11-07 ENCOUNTER — HOSPITAL ENCOUNTER (OUTPATIENT)
Dept: MAMMOGRAPHY | Age: 72
Discharge: HOME | End: 2022-11-09
Payer: MEDICARE

## 2022-11-07 DIAGNOSIS — Z12.31 SCREENING MAMMOGRAM, ENCOUNTER FOR: ICD-10-CM

## 2022-11-07 PROCEDURE — 77067 SCR MAMMO BI INCL CAD: CPT

## 2022-11-07 NOTE — TELEPHONE ENCOUNTER
Last OV: 8/29/2022  chronic   Last RX:    Next scheduled apt: 11/14/2022  chronic           Pt requesting a refill

## 2022-11-07 NOTE — TELEPHONE ENCOUNTER
Patient is asking for a refill on Delica Plus Test strips. Patient last seen 8/29/22. Next appt 11/14/22. Drug [address] Maintenance   Topic Date Due    Shingles vaccine (2 of 3) 05/26/2014    Diabetic foot exam  03/08/2022    COVID-19 Vaccine (5 - Booster for Pfizer series) 06/17/2022    Diabetic microalbuminuria test  02/02/2023    Colorectal Cancer Screen  03/22/2023    Diabetic retinal exam  04/05/2023    Depression Monitoring  06/13/2023    Annual Wellness Visit (AWV)  06/14/2023    Breast cancer screen  08/10/2023    A1C test (Diabetic or Prediabetic)  09/19/2023    Lipids  10/14/2023    DTaP/Tdap/Td vaccine (2 - Td or Tdap) 02/19/2032    DEXA (modify frequency per FRAX score)  Completed    Flu vaccine  Completed    Pneumococcal 65+ years Vaccine  Completed    Hepatitis C screen  Completed    Hepatitis A vaccine  Aged Out    Hib vaccine  Aged Out    Meningococcal (ACWY) vaccine  Aged Out             (applicable per patient's age: Cancer Screenings, Depression Screening, Fall Risk Screening, Immunizations)    Hemoglobin A1C (%)   Date Value   09/19/2022 7.7   06/13/2022 7.0   02/02/2022 6.4 (H)     Microalb/Crt.  Ratio (mcg/mg creat)   Date Value   02/02/2022 6     LDL Cholesterol (mg/dL)   Date Value   10/14/2022 84     AST (U/L)   Date Value   10/14/2022 16     ALT (U/L)   Date Value   10/14/2022 16     BUN (mg/dL)   Date Value   10/14/2022 14      (goal A1C is < 7)   (goal LDL is <100) need 30-50% reduction from baseline     BP Readings from Last 3 Encounters:   08/29/22 138/78   06/13/22 (!) 140/78   04/11/22 (!) 140/78    (goal /80)      All Future Testing planned in [facility]:  Lab Frequency Next Occurrence       Next Visit Date:  Future Appointments   Date Time Provider Soha Rodriguez   11/14/2022  4:00 PM DANA Bronson CNP [facility]   2/20/2023 11:00 AM DANA Bronson CNP [facility]            Patient Active Problem List:     Type 2 diabetes, HbA1c goal < 7% (HCC)     Pure hypercholesterolemia     Essential hypertension, benign     Senile osteoporosis     Anxiety state     Dermatophytosis of foot     Type 2 diabetes mellitus with hyperglycemia     Osteopenia of both hips     Anxiety and depression     Diabetes mellitus due to underlying condition with complication, without long-term current use of insulin ([facility] [address]

## 2022-11-08 ENCOUNTER — CARE COORDINATION (OUTPATIENT)
Dept: CARE COORDINATION | Age: 72
End: 2022-11-08

## 2022-11-08 SDOH — ECONOMIC STABILITY: HOUSING INSECURITY: IN THE LAST 12 MONTHS, HOW MANY PLACES HAVE YOU LIVED?: 1

## 2022-11-08 SDOH — ECONOMIC STABILITY: TRANSPORTATION INSECURITY
IN THE PAST 12 MONTHS, HAS THE LACK OF TRANSPORTATION KEPT YOU FROM MEDICAL APPOINTMENTS OR FROM GETTING MEDICATIONS?: NO

## 2022-11-08 SDOH — ECONOMIC STABILITY: HOUSING INSECURITY
IN THE LAST 12 MONTHS, WAS THERE A TIME WHEN YOU DID NOT HAVE A STEADY PLACE TO SLEEP OR SLEPT IN A SHELTER (INCLUDING NOW)?: NO

## 2022-11-08 SDOH — ECONOMIC STABILITY: INCOME INSECURITY: IN THE LAST 12 MONTHS, WAS THERE A TIME WHEN YOU WERE NOT ABLE TO PAY THE MORTGAGE OR RENT ON TIME?: NO

## 2022-11-08 SDOH — ECONOMIC STABILITY: TRANSPORTATION INSECURITY
IN THE PAST 12 MONTHS, HAS LACK OF TRANSPORTATION KEPT YOU FROM MEETINGS, WORK, OR FROM GETTING THINGS NEEDED FOR DAILY LIVING?: NO

## 2022-11-08 ASSESSMENT — SOCIAL DETERMINANTS OF HEALTH (SDOH)
HOW OFTEN DO YOU GET TOGETHER WITH FRIENDS OR RELATIVES?: THREE TIMES A WEEK
HOW OFTEN DO YOU ATTEND CHURCH OR RELIGIOUS SERVICES?: 1 TO 4 TIMES PER YEAR
IN A TYPICAL WEEK, HOW MANY TIMES DO YOU TALK ON THE PHONE WITH FAMILY, FRIENDS, OR NEIGHBORS?: MORE THAN THREE TIMES A WEEK
DO YOU BELONG TO ANY CLUBS OR ORGANIZATIONS SUCH AS CHURCH GROUPS UNIONS, FRATERNAL OR ATHLETIC GROUPS, OR SCHOOL GROUPS?: NO
HOW OFTEN DO YOU ATTENT MEETINGS OF THE CLUB OR ORGANIZATION YOU BELONG TO?: NEVER

## 2022-11-08 NOTE — CARE COORDINATION
Ambulatory Care Coordination Note  11/8/2022    ACC: Nara Ornelas RN    Her blood sugars are about the same since starting Acarbose last week- 140s fasting in AM. She also checks 2 hours after lunch or after supper, didn't give those numbers, didn't have her logbook with her. No hypoglycemia. Discussed her medications- she had patient assistance program last year for Janumet but then she lost her health insurance and thinks the PAP caused her to lose her health insurance, she was told by [facility] \"you can't have two drug insurances\". She doesn't know how it happened and nobody at [facility] can give her an answer but they assured her she is allowed to apply for PAP. She only got about 10 pills of Acarbose from [address]. They asked if she wanted them synced with her other meds so probably why got a partial supply, she didn't understand what that meant and didn't ask. Stated she gets overwhelmed easily. She is following a low carb diet, reads all food labels but questioned how much grams she is supposed to have in a day, she doesn't undersand the difference between grams and carbs. She accepted referral to dietician for explanation and help her decide how much she should eat per meal and per day. She is motivated to control her diabetes, her significant other has a foot infection from his diabetes, she wants to prevent those types of complications. SDOH- no needs identified. She drives, independent with all care, no financial concerns other than the Janumet, other meds affordable. Offered patient enrollment in the Remote Patient Monitoring (RPM) program for in-home monitoring: Patient declined. CC Plan:   -Call next week to review PCP progress notes, discuss blood sugars in more detail.   Diabetes Assessment    Medic Alert ID: No  Meal Planning: Avoidance of concentrated sweets   How often do you test your blood sugar?: Other (Comment: fasting and after supper)   Do you have barriers with adherence to non-pharmacologic self-management interventions? (Nutrition/Exercise/Self-Monitoring): No   Have you ever had to go to the ED for symptoms of low blood sugar?: No       Do you have hyperglycemia symptoms?: No   Do you have hypoglycemia symptoms?: No   Last Blood Sugar Value: 155   Blood Sugar Monitoring Regimen: Morning Fasting, 2 Hours Post Meal         and   General Assessment    Do you have any symptoms that are causing concern?: No           Lab Results       None            Care Coordination Interventions    Referral from Primary Care Provider: No  Suggested Interventions and Community Resources  Fall Risk Prevention: Declined  Registered Dietician: In Process  Specialty Services Referral: In Process (Comment: Pharmacy tech support for PAP application for Janumet)          Goals Addressed                   This Visit's Progress     Self Monitoring        Self-Monitored Blood Glucose - I will check my blood sugar Fasting blood sugar and Other: 2 hours after a meal  I will notify my provider of any trends of increasing or decreasing blood sugars over a 1 month period. I will notify my provider if I have any blood sugar readings less than 70 more than 2 times a month. None Recently Recorded    Barriers: lack of education  Plan for overcoming my barriers: ACM calls, education, dietician referral, pharmacy  referral for Janumet  Confidence: 9/10  Anticipated Goal Completion Date: 2/10/23                Prior to Admission medications    Medication Sig Start Date End Date Taking?  Authorizing Provider   blood glucose test strips (ASCENSIA AUTODISC VI;ONE TOUCH ULTRA TEST VI) strip Test sugar Bid and as needed Dx E 11.9 11/7/22  Yes Stefania Baker, APRN - CNP   metFORMIN (GLUCOPHAGE) 1000 MG tablet Take 1 tablet by mouth 2 times daily (with meals) 10/28/22  Yes Stefania Baker, APRN - CNP   acarbose (PRECOSE) 25 MG tablet Take 1 tablet by mouth Daily with supper For diabetes 9/20/22  Yes Nichol Gordon Iván, APRN - CNP   losartan (COZAAR) 100 MG tablet Take 1 tablet by mouth daily For hypertension/kidney protection with diabetes 9/7/22  Yes DANA Ramos CNP   atorvastatin (LIPITOR) 20 MG tablet TAKE 1 TABLET BY MOUTH DAILY 8/4/22  Yes DANA Ramos CNP   amLODIPine (NORVASC) 10 MG tablet Take 1 tablet by mouth daily 8/4/22  Yes DANA Ramos CNP   venlafaxine (EFFEXOR XR) 75 MG extended release capsule TAKE 1 CAPSULE BY MOUTH DAILY 8/4/22  Yes DANA Ramos CNP   Cholecalciferol (VITAMIN D3) 125 MCG (5000 UT) TABS Take by mouth daily   Yes Historical Provider, MD   Lancets MISC Testing Blood sugar once daily and as needed.  8/12/21  Yes DANA Ramos CNP   aspirin 81 MG tablet Take 81 mg by mouth daily   Yes Historical Provider, MD   clindamycin (CLEOCIN) 300 MG capsule Take 2 capsules by mouth once for 1 dose Prior to dental procedure  Patient not taking: No sig reported 11/4/21   Historical Provider, MD   Calcium Carbonate-Vitamin D (CALCIUM PLUS VITAMIN D) 500-50 MG-UNIT CAPS Take 1 capsule by mouth daily 8/23/21   DANA Ramos CNP   TGT PSYLLIUM FIBER PO Take 2 capsules by mouth daily    Historical Provider, MD       Future Appointments   Date Time Provider Soha Rodriguez   11/14/2022  4:00 PM DANA Ramos CNP [facility]   2/20/2023 11:00 AM DANA Ramos CNP [facility]

## 2022-11-08 NOTE — CARE COORDINATION
I was able to mail the patient her portion of the application for assistance on her Januvia.          [facility]   Medication Assistance  [facility], and [employer]    V) [phone] (D) [phone]

## 2022-11-14 ENCOUNTER — OFFICE VISIT (OUTPATIENT)
Dept: FAMILY MEDICINE CLINIC | Age: 72
End: 2022-11-14
Payer: MEDICARE

## 2022-11-14 VITALS
BODY MASS INDEX: 33.49 KG/M2 | WEIGHT: 189 LBS | SYSTOLIC BLOOD PRESSURE: 138 MMHG | OXYGEN SATURATION: 98 % | DIASTOLIC BLOOD PRESSURE: 78 MMHG | HEIGHT: 63 IN | HEART RATE: 103 BPM

## 2022-11-14 DIAGNOSIS — Z91.14 VARIABLE COMPLIANCE WITH MEDICATION THERAPY: ICD-10-CM

## 2022-11-14 DIAGNOSIS — E78.2 MIXED HYPERLIPIDEMIA: ICD-10-CM

## 2022-11-14 DIAGNOSIS — F41.8 ANXIETY ABOUT HEALTH: ICD-10-CM

## 2022-11-14 DIAGNOSIS — E08.8 DIABETES MELLITUS DUE TO UNDERLYING CONDITION WITH COMPLICATION, WITHOUT LONG-TERM CURRENT USE OF INSULIN (HCC): ICD-10-CM

## 2022-11-14 DIAGNOSIS — I10 ESSENTIAL HYPERTENSION, BENIGN: Primary | ICD-10-CM

## 2022-11-14 PROCEDURE — G8399 PT W/DXA RESULTS DOCUMENT: HCPCS | Performed by: NURSE PRACTITIONER

## 2022-11-14 PROCEDURE — G8427 DOCREV CUR MEDS BY ELIG CLIN: HCPCS | Performed by: NURSE PRACTITIONER

## 2022-11-14 PROCEDURE — 1123F ACP DISCUSS/DSCN MKR DOCD: CPT | Performed by: NURSE PRACTITIONER

## 2022-11-14 PROCEDURE — G8484 FLU IMMUNIZE NO ADMIN: HCPCS | Performed by: NURSE PRACTITIONER

## 2022-11-14 PROCEDURE — G8417 CALC BMI ABV UP PARAM F/U: HCPCS | Performed by: NURSE PRACTITIONER

## 2022-11-14 PROCEDURE — 1090F PRES/ABSN URINE INCON ASSESS: CPT | Performed by: NURSE PRACTITIONER

## 2022-11-14 PROCEDURE — 3078F DIAST BP <80 MM HG: CPT | Performed by: NURSE PRACTITIONER

## 2022-11-14 PROCEDURE — 1036F TOBACCO NON-USER: CPT | Performed by: NURSE PRACTITIONER

## 2022-11-14 PROCEDURE — 99213 OFFICE O/P EST LOW 20 MIN: CPT | Performed by: NURSE PRACTITIONER

## 2022-11-14 PROCEDURE — 3074F SYST BP LT 130 MM HG: CPT | Performed by: NURSE PRACTITIONER

## 2022-11-14 PROCEDURE — 3017F COLORECTAL CA SCREEN DOC REV: CPT | Performed by: NURSE PRACTITIONER

## 2022-11-14 NOTE — PATIENT INSTRUCTIONS
Phone: [phone]  Fax: [phone] [facility] Office Hours:  Monday: [facility] [employer] office location [phone] ([phone]) Offering additional late hours the first Monday of the month until 7 pm.   Tuesday: 8-5 Wednesday: 8-5 Thursday:  Additional hours offered 2 Thursdays a month. Please call to inquire those dates. Fridays: 7:30-4:30   SURVEY:    You may be receiving a survey from [employer] regarding your visit today. Please complete the survey to enable us to provide the highest quality of care to you and your family. If you cannot score us a very good on any question, please call the office to discuss how we could have made your experience a very good one. Thank you.

## 2022-11-15 ENCOUNTER — CARE COORDINATION (OUTPATIENT)
Dept: CARE COORDINATION | Age: 72
End: 2022-11-15

## 2022-11-15 NOTE — CARE COORDINATION
Referral from LIBBY, Cristofer Goncalves, RN-  Was PCP referral for me to help with her medications. Diabetic, she reads food labels but not sure how many grams of carbs or how many carbs she is supposed to eat in a day, is confusing for her. She will be out of town until next Tuesday 11/15, so she wants called after that. Thanks. Will reach out to patient for consult.   SHAYNA Eason

## 2022-11-15 NOTE — CARE COORDINATION
Registered Dietitian Initial Assessment for Care Coordination      Name-Krupa Aquino  November 15, 2022    Initial Referral Reason: Diabetes    Patient Care Team:  DANA Fu CNP as PCP - General (Nurse Practitioner)  DANA Fu CNP as PCP - [facility] Empaneled Provider  Flores Petit RN as Registered Nurse  Stephanie López RN as [address] [place] [address]  as   Lee Peterson RD, LD as Dietitian    Patient Active Problem List   Diagnosis    Type 2 diabetes, HbA1c goal < 7% ([address]    Pure hypercholesterolemia    Essential hypertension, benign    Senile osteoporosis    Anxiety state    Dermatophytosis of foot    Type 2 diabetes mellitus with hyperglycemia    Osteopenia of both hips    Anxiety and depression    Diabetes mellitus due to underlying condition with complication, without long-term current use of insulin ([address]       Current Outpatient Medications   Medication Sig Dispense Refill    blood glucose test strips (ASCENSIA AUTODISC VI;ONE TOUCH ULTRA TEST VI) strip Test sugar Bid and as needed Dx E 11.9 100 each 3    metFORMIN (GLUCOPHAGE) 1000 MG tablet Take 1 tablet by mouth 2 times daily (with meals) 60 tablet 0    acarbose (PRECOSE) 25 MG tablet Take 1 tablet by mouth Daily with supper For diabetes 30 tablet 1    losartan (COZAAR) 100 MG tablet Take 1 tablet by mouth daily For hypertension/kidney protection with diabetes 90 tablet 1    atorvastatin (LIPITOR) 20 MG tablet TAKE 1 TABLET BY MOUTH DAILY 90 tablet 1    amLODIPine (NORVASC) 10 MG tablet Take 1 tablet by mouth daily 90 tablet 1    venlafaxine (EFFEXOR XR) 75 MG extended release capsule TAKE 1 CAPSULE BY MOUTH DAILY 90 capsule 1    Cholecalciferol (VITAMIN D3) 125 MCG (5000 UT) TABS Take by mouth daily      clindamycin (CLEOCIN) 300 MG capsule Take 2 capsules by mouth once for 1 dose Prior to dental procedure (Patient not taking: No sig reported)      Calcium Carbonate-Vitamin D (CALCIUM PLUS VITAMIN D) 500-50 MG-UNIT CAPS Take 1 capsule by mouth daily 60 capsule     Lancets MISC Testing Blood sugar once daily and as needed. 100 each 1    TGT PSYLLIUM FIBER PO Take 2 capsules by mouth daily      aspirin 81 MG tablet Take 81 mg by mouth daily       No current facility-administered medications for this visit.          Visit for:  Obesity/Weight loss  Diabetes:X  Hypertension:  Hyperlipidemia:  Other:     Anthropometric Measurements:  HT:5'3  Weight:189  IBW:115 + or - 10%  BMI:33    Biochemical Data, Medical Tests and Procedures:    Lab Results   Component Value Date    LABA1C 7.7 09/19/2022     Lab Results   Component Value Date     02/02/2022       Lab Results   Component Value Date    CHOL 152 10/14/2022    CHOL 137 02/02/2022    CHOL 164 07/30/2021     Lab Results   Component Value Date    TRIG 135 10/14/2022    TRIG 114 02/02/2022    TRIG 214 (H) 07/30/2021     Lab Results   Component Value Date    HDL 41 10/14/2022    HDL 39 (L) 02/02/2022    HDL 37 (L) 07/30/2021     Lab Results   Component Value Date    LDLCHOLESTEROL 84 10/14/2022    LDLCHOLESTEROL 75 02/02/2022    LDLCHOLESTEROL 84 07/30/2021     Lab Results   Component Value Date    VLDL NOT REPORTED 02/02/2022    VLDL NOT REPORTED (H) 07/30/2021    VLDL NOT REPORTED 07/09/2020     Lab Results   Component Value Date    CHOLHDLRATIO 3.7 10/14/2022    CHOLHDLRATIO 3.5 02/02/2022    CHOLHDLRATIO 4.4 07/30/2021       Lab Results   Component Value Date    WBC 14.1 (H) 07/30/2021    HGB 12.9 07/30/2021    HCT 38.9 07/30/2021    MCV 74.4 (L) 07/30/2021     (H) 07/30/2021       Lab Results   Component Value Date    CREATININE 0.66 10/14/2022    BUN 14 10/14/2022     10/14/2022    K 3.9 10/14/2022     10/14/2022    CO2 25 10/14/2022         NUTRITION DIAGNOSIS    #1 Problem  Food and nutrition-related knowledge deficit       Etiology  related to lack of prior nutrition related education       Signs/Symptoms  as evidenced by referral for nutrition education for diabetes    NUTRITION INTERVENTION  Nutrition Prescription: used adjusted weight of 135#    diabetic diet providing 1500 kcals/day     Estimated daily CHO Needs: 45-60 gms/meal, 15gms/snack   Protein needs:62gms/d    Patient Goals:  1. Patient will work on meal pattern, patient admits only eating 2-3 meals/day, skips breakfast some  days. Timing, spacing and consistency of meal times discussed. Quick/easy breakfast suggestions discussed, will send patient a list of suggestions. Goal is 3 meals daily spaced every 4-5 hours. 2. Discussed what foods contain carbohydrate to limit and how to measure out portions. Encouraged patient to limit carbs to 45-60gms/meal, 15gms/snack. We discussed carb counting in detail and what a serving size is. 3. Discussed plate method, encouraged patient to increase intake of non-starchy vegetables. Goal is 1/2 of  plate to be non-starchy vegetables, 1/4 lean protein, 1/4 carbs. Foods from each category reviewed along with what a serving size is. We discussed snacking in detail. Will send lists of low carb snack suggestions to patient. 4. Discussed avoiding/limiting sweets and sweetened drinks. Patient drinks water, avoids sugary drinks. Discussed also limiting/avoiding sweets. Encouraged to limit sweets to once a week or less and to keep them out of the house as much as possible to avoid temptation. Nutrition Intervention Need:  Initial/Brief:  Comprehensive Nutrition Education:X  Coordination of other care during nutrition care:    Monitoring and Evaluation:  Ability to plan meals/snacks:X  Ability to select healthy foods/meals:X  Food and Nutrition Knowledge:X  Self Monitoring:  Physical Activity:  Energy intake:  Fluid/beverage intake:  Fat/chol intake:  Carb intake:X  Other:    Plan: 1. Will continue to educate patient on reading food labels, measuring out portions, carb counting, low carb snacking, plate method, importance of meal pattern, quick/easy diabetic meal ideas, and limiting/cutting out sweets and sugary drinks. Will mail patient nutrition information on all the above discussed.   2. Will follow up in 2-3 weeks to review nutrition information and answer questions      SHAYNA Cosme

## 2022-11-17 ENCOUNTER — CARE COORDINATION (OUTPATIENT)
Dept: CARE COORDINATION | Age: 72
End: 2022-11-17

## 2022-11-17 NOTE — CARE COORDINATION
Left VM message asking patient to call me back at [phone] for care management follow up, discuss blood sugars and ACP. Will call again in a week.     Future Appointments   Date Time Provider Soha Rodriguez   2/20/2023 11:00 AM DANA Callejas - [facility]

## 2022-11-18 ENCOUNTER — CARE COORDINATION (OUTPATIENT)
Dept: CARE COORDINATION | Age: 72
End: 2022-11-18

## 2022-11-18 NOTE — CARE COORDINATION
I was able to mail her new application for [phone] on her [place] through [place] [employer]. She will get information back from the .       [facility]   Medication Assistance  [address] [place] [address], and [employer]    (M) [phone] (U) [phone]

## 2022-11-23 ASSESSMENT — ENCOUNTER SYMPTOMS
DIARRHEA: 0
SORE THROAT: 0
VOICE CHANGE: 0
ABDOMINAL DISTENTION: 0
CHEST TIGHTNESS: 0
SHORTNESS OF BREATH: 0
RHINORRHEA: 0
EYES NEGATIVE: 1

## 2022-11-24 NOTE — PROGRESS NOTES
HPI Notes    Name: Olivia Serrano  : 1950         Chief Complaint:     Chief Complaint   Patient presents with    Diabetes     Pt presents today with blood sugar numbers       History of Present Illness:        Diabetes  Pertinent negatives for hypoglycemia include no dizziness, headaches, nervousness/anxiousness or tremors. Pertinent negatives for diabetes include no chest pain. Pt here with son Samara Templeton,  to discuss medications. Pt on Janumet for the last year with pharmacy assistance program covering it. Encouraged pt to reapply but she is unsure. If she wants to be on that med. It saves her $2250.00 per year. Insurance cost would be $536 for 90 day supply. Hemoglobin A1C   Date Value Ref Range Status   2022 7.7 % Final     Pt starting to get medication confused likes everything written down , typed med list given today. Discussed precose which pt is taking once a day since running out of her janumet and she restarted metformin. Reviewed health promotion, disease monitoring with pt and son. Hypertension well controlled no chest pain, no dizziness, no shortness of breath. Taking losartan and amlodipine daily . Anxiety and depression on effexor with good effect. Pt desires to stay on this medication.      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    Osteopenia 2011    femoral necks jean -1.9, -1.8      Reviewed all health maintenance requirements and ordered appropriate tests  Health Maintenance Due   Topic Date Due    Shingles vaccine (2 of 3) 2014    Diabetic foot exam  2022       Past Surgical History:     Past Surgical History:   Procedure Laterality Date    CHOLECYSTECTOMY      age 35    COLON SURGERY      colon resection bowel blockage/adhesions-gangrene    COLONOSCOPY  09/2016    Mikey GORDON    COLONOSCOPY N/A 03/22/2021    COLONOSCOPY  repeat in 2 years 2023, result polypectomy Dannie Lubin MD at [address]      x3 pulled    HYSTERECTOMY (CERVIX STATUS UNKNOWN)      kept ovaries        Medications:       Prior to Admission medications    Medication Sig Start Date End Date Taking? Authorizing Provider   metFORMIN (GLUCOPHAGE) 1000 MG tablet Take 1 tablet by mouth 2 times daily (with meals) 10/28/22  Yes DANA Colunga CNP   losartan (COZAAR) 100 MG tablet Take 1 tablet by mouth daily For hypertension/kidney protection with diabetes 9/7/22  Yes DANA Colunga CNP   atorvastatin (LIPITOR) 20 MG tablet TAKE 1 TABLET BY MOUTH DAILY 8/4/22  Yes DANA Colunga CNP   amLODIPine (NORVASC) 10 MG tablet Take 1 tablet by mouth daily 8/4/22  Yes DANA Colunga CNP   venlafaxine (EFFEXOR XR) 75 MG extended release capsule TAKE 1 CAPSULE BY MOUTH DAILY 8/4/22  Yes DANA Colunga CNP   Cholecalciferol (VITAMIN D3) 125 MCG (5000 UT) TABS Take by mouth daily   Yes Historical Provider, MD   Calcium Carbonate-Vitamin D (CALCIUM PLUS VITAMIN D) 500-50 MG-UNIT CAPS Take 1 capsule by mouth daily 8/23/21  Yes DANA Colunga CNP   TGT PSYLLIUM FIBER PO Take 2 capsules by mouth daily   Yes Historical Provider, MD   aspirin 81 MG tablet Take 81 mg by mouth daily   Yes Historical Provider, MD   acarbose (PRECOSE) 25 MG tablet Take 1 tablet by mouth Daily with supper For diabetes 11/17/22   DANA Colunga CNP   blood glucose test strips (ASCENSIA AUTODISC VI;ONE TOUCH ULTRA TEST VI) strip Test sugar Bid and as needed Dx E 11.9 11/7/22   DANA Colunga CNP   Lancets MISC Testing Blood sugar once daily and as needed.  8/12/21   DANA Colunga CNP        Allergies:       Penicillins and Dust mite extract    Social History:     Tobacco:    reports that she has never smoked. She has never used smokeless tobacco.  Alcohol:      reports no history of alcohol use. Drug Use:  reports no history of drug use. Family History:     Family History   Problem Relation Age of Onset    Hypertension Mother     Dementia Mother     Hypertension Father     Heart Disease Father         stroke during heart surgery     High Cholesterol Father     Heart Disease Brother         cath with stents    Heart Attack Maternal Grandmother     Other Paternal Grandmother         cerbral aneurysm    Alcohol Abuse Brother     Cancer Brother         kidney       Review of Systems:         Review of Systems   Constitutional:  Negative for activity change, chills and fever. HENT:  Negative for congestion, rhinorrhea, sore throat and voice change. Eyes: Negative. Respiratory:  Negative for chest tightness and shortness of breath. Cardiovascular:  Negative for chest pain and leg swelling. Gastrointestinal:  Negative for abdominal distention and diarrhea. Endocrine: Negative for cold intolerance and heat intolerance. Genitourinary:  Negative for difficulty urinating. Musculoskeletal:  Negative for arthralgias and gait problem. Skin:  Negative for rash. Neurological:  Negative for dizziness, tremors and headaches. Psychiatric/Behavioral:  Negative for decreased concentration and sleep disturbance. The patient is not nervous/anxious. Physical Exam:     Vitals:  /78 (Site: Left Upper Arm, Position: Sitting)   Pulse (!) 103   Ht 5' 3\" (1.6 m)   Wt 189 lb (85.7 kg)   LMP  (LMP Unknown) Comment: partial  SpO2 98%   BMI 33.48 kg/m²       Physical Exam  Vitals and nursing note reviewed. Constitutional:       General: She is not in acute distress. Appearance: Normal appearance. She is well-developed. HENT:      Head: Normocephalic and atraumatic.       Right Ear: Tympanic membrane and external ear normal.      Left Ear: Tympanic membrane and external ear normal.      Nose: No congestion. Mouth/Throat:      Mouth: Mucous membranes are moist.      Pharynx: No oropharyngeal exudate. Eyes:      General: No scleral icterus. Pupils: Pupils are equal, round, and reactive to light. Neck:      Vascular: No carotid bruit (neg jean). Cardiovascular:      Rate and Rhythm: Normal rate and regular rhythm. Heart sounds: Normal heart sounds. No murmur heard. Pulmonary:      Effort: Pulmonary effort is normal. No respiratory distress. Breath sounds: Normal breath sounds. No wheezing. Abdominal:      Palpations: Abdomen is soft. Tenderness: There is no abdominal tenderness. Musculoskeletal:         General: Normal range of motion. Cervical back: Normal range of motion and neck supple. Right lower leg: No edema. Left lower leg: No edema. Lymphadenopathy:      Cervical: No cervical adenopathy. Skin:     General: Skin is warm and dry. Findings: No rash. Neurological:      Mental Status: She is alert and oriented to person, place, and time. Psychiatric:         Behavior: Behavior normal.         Thought Content:  Thought content normal.         Judgment: Judgment normal.             Data:     Lab Results   Component Value Date/Time     10/14/2022 09:12 AM    K 3.9 10/14/2022 09:12 AM     10/14/2022 09:12 AM    CO2 25 10/14/2022 09:12 AM    BUN 14 10/14/2022 09:12 AM    CREATININE 0.66 10/14/2022 09:12 AM    GLUCOSE 183 10/14/2022 09:12 AM    GLUCOSE 179 04/12/2012 07:59 AM    PROT 7.3 10/14/2022 09:12 AM    LABALBU 4.0 10/14/2022 09:12 AM    LABALBU 4.0 04/12/2012 07:59 AM    BILITOT 0.3 10/14/2022 09:12 AM    ALKPHOS 123 10/14/2022 09:12 AM    AST 16 10/14/2022 09:12 AM    ALT 16 10/14/2022 09:12 AM     Lab Results   Component Value Date/Time    WBC 14.1 07/30/2021 08:05 AM    RBC 5.23 07/30/2021 08:05 AM    HGB 12.9 07/30/2021 08:05 AM    HCT 38.9 07/30/2021 08:05 AM    MCV 74.4 07/30/2021 08:05 AM    MCH 24.6 07/30/2021 08:05 AM    MCHC 33.1 07/30/2021 08:05 AM    RDW 15.6 07/30/2021 08:05 AM     07/30/2021 08:05 AM    MPV NOT REPORTED 07/30/2021 08:05 AM     Lab Results   Component Value Date/Time    TSH 2.39 10/14/2022 09:12 AM     Lab Results   Component Value Date/Time    CHOL 152 10/14/2022 09:12 AM    HDL 41 10/14/2022 09:12 AM    LABA1C 7.7 09/19/2022 10:44 AM          Assessment & Plan        Diagnosis Orders   1. Essential hypertension, benign        2. Diabetes mellitus due to underlying condition with complication, without long-term current use of insulin ([facility] [address]        3. Anxiety about health        4. Mixed hyperlipidemia        5. Variable compliance with medication therapy                          Completed Refills   Requested Prescriptions      No prescriptions requested or ordered in this encounter     Return in about 6 months (around 5/14/2023) for diabetes. No orders of the defined types were placed in this encounter. No orders of the defined types were placed in this encounter. Patient Instructions   Phone: [phone]  Fax: [address] [facility] Office Hours:  Monday: [place] office location 8-5 ([phone]) Offering additional late hours the first Monday of the month until 7 pm.   Tuesday: 8-5 Wednesday: 8-5 Thursday:  Additional hours offered 2 Thursdays a month. Please call to inquire those dates. Fridays: 7:30-4:30   SURVEY:    You may be receiving a survey from [employer] regarding your visit today. Please complete the survey to enable us to provide the highest quality of care to you and your family. If you cannot score us a very good on any question, please call the office to discuss how we could have made your experience a very good one. Thank you.    Electronically signed by DANA Chacko CNP on 11/23/2022 at 10:21 PM           Completed Refills   Requested Prescriptions      No prescriptions requested or ordered in this encounter

## 2022-11-30 ENCOUNTER — CARE COORDINATION (OUTPATIENT)
Dept: CARE COORDINATION | Age: 72
End: 2022-11-30

## 2022-11-30 NOTE — CARE COORDINATION
Left VM message asking patient to call me back at [phone] for care management follow up, check blood sugars. Will call again next week.     Future Appointments   Date Time Provider Soha Rodriguez   2/20/2023 11:00 AM DANA Ayoub - [facility]

## 2022-12-05 ENCOUNTER — CARE COORDINATION (OUTPATIENT)
Dept: CARE COORDINATION | Age: 72
End: 2022-12-05

## 2022-12-05 NOTE — CARE COORDINATION
Nutrition Care Coordinator Follow-Up visit:    Food Recall:eating 2-3 meals/d    Activity Level:  Sedentary:X  Lightly Active: Moderately Active:  Very Active:    Adult BMI:  Underweight (below 18.5)  Normal Weight (18.5-24.9)  Overweight (25-29. 9)  Obese (30-39.9)X  Morbidly Obese (>40)    Weight Change:down 8# over the last 2-3 months    Plan:  Plan was established with patient:  Increase dietary fiber by consuming whole grains, fruits and vegetables:X  Limit dietary cholesterol to >200mg/day: Increase water intake:  Avoid added sugar:X  Avoid sweetened beverages:X  Choose lean meats:      Monitoring: Will monitor weight:  Will monitor adherence to meal plan: Will monitor adherence to exercise plan: Will monitor HGA1c:X    Handouts Provided :  Low Carb snacking:X  Carb counting /individual meal plan:  Portion Control:X  Food Labels:  Physical Activity:  Low Fat/Cholesterol:  Hypo/Hyperglycemia:  Calorie Controlled Meal Plan:    Goals: Increase water consumption to 8oz. 6-8 times daily:  Manage blood sugars by consuming 3 meals spaced every 4-5 hours with 2-3 snacks daily:reviewed  Increase fiber and decrease fat intake by consuming 1-2 fruit servings and 2-3 vegetable servings per day. Increase physical activity by:  Consume less than 2,000mg of sodium/day  Avoid consumption of sweetened beverages and added sugar by reading food labels:reviewed  Monitor blood sugars by using meter to check blood glucose before morning meal and 2 hours after a meal daily:BS <160 per patient  Decrease risk of coronary heart disease by consuming fish that contains omega-3 fatty acids at least twice a week, avoiding partially hydrogenated oil/trans fats and limiting saturated fat intake by reading food labels:reviewed    Patient goals set: 1. Reviewed working on meal pattern, patient is eating 2-3 meals/day, skips breakfast some days. Timing, spacing and consistency of meal times discussed.  Quick/easy breakfast suggestions discussed, will send patient a list of suggestions. Goal is 3 meals daily spaced every 4-5 hours. 2. Reviewed what foods contain carbohydrate to limit and how to measure out portions. Encouraged patient to limit carbs to 45-60gms/meal, 15gms/snack. We discussed carb counting in detail and what a serving size is. 3. Reviewed plate method, encouraged patient to increase intake of non-starchy vegetables. Goal is 1/2 of  plate to be non-starchy vegetables, 1/4 lean protein, 1/4 carbs. Foods from each category reviewed along with what a serving size is. Reviewed snacking in detail. Will send lists of low carb snack suggestions to patient. 4. Reviewed avoiding/limiting sweets and sweetened drinks. Patient drinks water, avoids sugary drinks. Reviewed limiting/avoiding sweets. Encouraged to limit sweets to once a week or less and to keep them out of the house as much as possible to avoid temptation. Patient states doing well, she did get nutrition information and read through it, has found it helpful. She states sugars have been pretty good- all <160, discussed good range for fasting sugars. We also discussed the holidays and suggestions for how to avoid over eating on the holidays. Will follow up with patient in 3-4 weeks to review and answer questions.   Shannan Officer

## 2022-12-08 DIAGNOSIS — E08.8 DIABETES MELLITUS DUE TO UNDERLYING CONDITION WITH COMPLICATION, WITHOUT LONG-TERM CURRENT USE OF INSULIN (HCC): ICD-10-CM

## 2022-12-08 NOTE — TELEPHONE ENCOUNTER
Last OV: 11/14/2022  Next scheduled apt: 2/20/2023      Pt needs refill Metformin , med pending your approval.      Thank you

## 2022-12-09 ENCOUNTER — CARE COORDINATION (OUTPATIENT)
Dept: CARE COORDINATION | Age: 72
End: 2022-12-09

## 2022-12-09 NOTE — CARE COORDINATION
Attempted to call for care management, unable to leave a message. Will call again next week.     Future Appointments   Date Time Provider Soha Rodriguez   2/20/2023 11:00 AM [facility]

## 2022-12-15 ENCOUNTER — CARE COORDINATION (OUTPATIENT)
Dept: CARE COORDINATION | Age: 72
End: 2022-12-15

## 2022-12-15 NOTE — CARE COORDINATION
Attempted to call for care management, left VM message with return contact info. This is fourth attempt to contact her to discuss meds, blood sugars, she has not returned call. Discharged from care management.     Future Appointments   Date Time Provider Soha Jennifer   2/20/2023 11:00 AM DANA Dickson - [facility]

## 2022-12-20 DIAGNOSIS — E08.8 DIABETES MELLITUS DUE TO UNDERLYING CONDITION WITH COMPLICATION, WITHOUT LONG-TERM CURRENT USE OF INSULIN (HCC): Primary | ICD-10-CM

## 2022-12-21 ENCOUNTER — CARE COORDINATION (OUTPATIENT)
Dept: CARE COORDINATION | Age: 72
End: 2022-12-21

## 2022-12-21 NOTE — CARE COORDINATION
Nutrition Care Coordinator Follow-Up visit:    Food Recall:eating 2-3 meals/d    Activity Level:  Sedentary:X  Lightly Active: Moderately Active:  Very Active:    Adult BMI:  Underweight (below 18.5)  Normal Weight (18.5-24.9)  Overweight (25-29. 9)  Obese (30-39.9)X  Morbidly Obese (>40)    Weight Change:no change    Plan:  Plan was established with patient:  Increase dietary fiber by consuming whole grains, fruits and vegetables:X  Limit dietary cholesterol to >200mg/day: Increase water intake:  Avoid added sugar:X  Avoid sweetened beverages:X  Choose lean meats:      Monitoring: Will monitor weight:  Will monitor adherence to meal plan: Will monitor adherence to exercise plan: Will monitor HGA1c:X    Handouts Provided :  Low Carb snacking:X  Carb counting /individual meal plan:  Portion Control:  Food Labels:  Physical Activity:  Low Fat/Cholesterol:  Hypo/Hyperglycemia:X  Calorie Controlled Meal Plan:    Goals: Increase water consumption to 8oz. 6-8 times daily:  Manage blood sugars by consuming 3 meals spaced every 4-5 hours with 2-3 snacks daily:reviewed  Increase fiber and decrease fat intake by consuming 1-2 fruit servings and 2-3 vegetable servings per day. Increase physical activity by:  Consume less than 2,000mg of sodium/day  Avoid consumption of sweetened beverages and added sugar by reading food labels:reviewed  Monitor blood sugars by using meter to check blood glucose before morning meal and 2 hours after a meal daily:BS all <160 per patient  Decrease risk of coronary heart disease by consuming fish that contains omega-3 fatty acids at least twice a week, avoiding partially hydrogenated oil/trans fats and limiting saturated fat intake by reading food labels:reviewed    Patient goals set: 1. Reviewed working on meal pattern, patient is eating 2-3 meals/day, skips breakfast some days. Timing, spacing and consistency of meal times discussed.  Quick/easy breakfast suggestions discussed, will send patient a list of suggestions. Goal is 3 meals daily spaced every 4-5 hours. 2. Reviewed what foods contain carbohydrate to limit and how to measure out portions. Encouraged patient to limit carbs to 45-60gms/meal, 15gms/snack. We discussed carb counting in detail and what a serving size is. 3. Reviewed plate method, encouraged patient to increase intake of non-starchy vegetables. Goal is 1/2 of  plate to be non-starchy vegetables, 1/4 lean protein, 1/4 carbs. Foods from each category reviewed along with what a serving size is. Reviewed snacking in detail. Will send lists of low carb snack suggestions to patient. 4. Reviewed avoiding/limiting sweets and sweetened drinks. Patient drinks water, avoids sugary drinks. Reviewed limiting/avoiding sweets. Encouraged to limit sweets to once a week or less and to keep them out of the house as much as possible to avoid temptation. Patient states doing well, sugars are good- all <160, reviewed good range for fasting sugars. Reviewed the holidays and suggestions for making better choices on holidays. Will follow up with patient in 3-4 weeks to review and answer questions.     Daniel Vu

## 2022-12-22 RX ORDER — ACARBOSE 25 MG/1
25 TABLET ORAL
Qty: 30 TABLET | Refills: 0 | Status: SHIPPED | OUTPATIENT
Start: 2022-12-22

## 2023-01-10 DIAGNOSIS — E08.8 DIABETES MELLITUS DUE TO UNDERLYING CONDITION WITH COMPLICATION, WITHOUT LONG-TERM CURRENT USE OF INSULIN (HCC): ICD-10-CM

## 2023-01-11 ENCOUNTER — CARE COORDINATION (OUTPATIENT)
Dept: CARE COORDINATION | Age: 73
End: 2023-01-11

## 2023-01-11 NOTE — CARE COORDINATION
Patient goals reviewed:  1.Reviewed working on meal pattern, patient is eating 2-3 meals/day, skips breakfast some days. Timing, spacing and consistency of meal times discussed. Quick/easy breakfast suggestions discussed, will send patient a list of suggestions.  Goal is 3 meals daily spaced every 4-5 hours.  2. Reviewed what foods contain carbohydrate to limit and how to measure out portions. Encouraged patient to limit carbs to 45-60gms/meal, 15gms/snack. We discussed carb counting in detail and what a serving size is.  3. Reviewed plate method, encouraged patient to increase intake of non-starchy vegetables.  Goal is 1/2 of  plate to be non-starchy vegetables, 1/4 lean protein, 1/4 carbs. Foods from each category reviewed along with what a serving size is. Reviewed snacking in detail. Will send lists of low carb snack suggestions to patient.  4. Reviewed avoiding/limiting sweets and sweetened drinks. Patient drinks water, avoids sugary drinks. Reviewed limiting/avoiding sweets. Encouraged to limit sweets to once a week or less and to keep them out of the house as much as possible to avoid temptation.  1/11/23-patient states doing well, decline further nutrition education. Provided with contact information to call if she has questions in the future. Removed from panel.  SHAYNA Eason

## 2023-01-18 DIAGNOSIS — E08.8 DIABETES MELLITUS DUE TO UNDERLYING CONDITION WITH COMPLICATION, WITHOUT LONG-TERM CURRENT USE OF INSULIN (HCC): ICD-10-CM

## 2023-01-18 RX ORDER — ACARBOSE 25 MG/1
25 TABLET ORAL
Qty: 30 TABLET | Refills: 0 | Status: SHIPPED | OUTPATIENT
Start: 2023-01-18

## 2023-01-31 DIAGNOSIS — E78.00 PURE HYPERCHOLESTEROLEMIA: ICD-10-CM

## 2023-01-31 DIAGNOSIS — F41.9 ANXIETY AND DEPRESSION: ICD-10-CM

## 2023-01-31 DIAGNOSIS — I10 ESSENTIAL HYPERTENSION, BENIGN: ICD-10-CM

## 2023-01-31 DIAGNOSIS — F32.A ANXIETY AND DEPRESSION: ICD-10-CM

## 2023-02-01 RX ORDER — LOSARTAN POTASSIUM 100 MG/1
100 TABLET ORAL DAILY
Qty: 90 TABLET | Refills: 1 | Status: SHIPPED | OUTPATIENT
Start: 2023-02-01

## 2023-02-01 RX ORDER — AMLODIPINE BESYLATE 10 MG/1
10 TABLET ORAL DAILY
Qty: 90 TABLET | Refills: 1 | Status: SHIPPED | OUTPATIENT
Start: 2023-02-01

## 2023-02-01 RX ORDER — ATORVASTATIN CALCIUM 20 MG/1
20 TABLET, FILM COATED ORAL DAILY
Qty: 90 TABLET | Refills: 1 | Status: SHIPPED | OUTPATIENT
Start: 2023-02-01

## 2023-02-01 RX ORDER — VENLAFAXINE HYDROCHLORIDE 75 MG/1
75 CAPSULE, EXTENDED RELEASE ORAL DAILY
Qty: 90 CAPSULE | Refills: 1 | Status: SHIPPED | OUTPATIENT
Start: 2023-02-01

## 2023-02-03 ENCOUNTER — TELEPHONE (OUTPATIENT)
Dept: PRIMARY CARE CLINIC | Age: 73
End: 2023-02-03

## 2023-02-03 NOTE — TELEPHONE ENCOUNTER
----- Message from Vickie Kayser sent at 2/3/2023 10:35 AM EST -----  Subject: Referral Request    Reason for referral request? Pt has an appt with Chey Swain on at 11am for DM   and Anxiety and she is wondering if labs are needed, please order if so   and call pt once ordered. Provider patient wants to be referred to(if known):     Provider Phone Number(if known): Additional Information for Provider?   ---------------------------------------------------------------------------  --------------  [address] [employer]    [phone];  Do not leave any message, patient will call back for answer  ---------------------------------------------------------------------------  --------------

## 2023-02-07 NOTE — TELEPHONE ENCOUNTER
Labs are due    Urine microalbumin will be requested at visit. Glyco will be finger poke at visit.      No other labs due    thanks

## 2023-02-20 ENCOUNTER — OFFICE VISIT (OUTPATIENT)
Dept: FAMILY MEDICINE CLINIC | Age: 73
End: 2023-02-20

## 2023-02-20 ENCOUNTER — HOSPITAL ENCOUNTER (OUTPATIENT)
Age: 73
Setting detail: SPECIMEN
Discharge: HOME | End: 2023-02-20
Payer: MEDICARE

## 2023-02-20 VITALS
OXYGEN SATURATION: 98 % | WEIGHT: 188 LBS | BODY MASS INDEX: 33.31 KG/M2 | SYSTOLIC BLOOD PRESSURE: 138 MMHG | HEART RATE: 88 BPM | HEIGHT: 63 IN | DIASTOLIC BLOOD PRESSURE: 88 MMHG

## 2023-02-20 DIAGNOSIS — E11.9 TYPE 2 DIABETES, HBA1C GOAL < 7% (HCC): Primary | ICD-10-CM

## 2023-02-20 DIAGNOSIS — E11.9 TYPE 2 DIABETES, HBA1C GOAL < 7% (HCC): ICD-10-CM

## 2023-02-20 DIAGNOSIS — M85.851 OSTEOPENIA OF BOTH HIPS: ICD-10-CM

## 2023-02-20 DIAGNOSIS — Z13.0 SCREENING, ANEMIA, DEFICIENCY, IRON: ICD-10-CM

## 2023-02-20 DIAGNOSIS — I10 ESSENTIAL HYPERTENSION, BENIGN: ICD-10-CM

## 2023-02-20 DIAGNOSIS — E78.2 MIXED HYPERLIPIDEMIA: ICD-10-CM

## 2023-02-20 DIAGNOSIS — F41.8 ANXIETY ABOUT HEALTH: ICD-10-CM

## 2023-02-20 DIAGNOSIS — M85.852 OSTEOPENIA OF BOTH HIPS: ICD-10-CM

## 2023-02-20 LAB
CREATININE URINE: 184.9 MG/DL (ref 28–217)
HBA1C MFR BLD: 6.9 %
MICROALBUMIN/CREAT 24H UR: 27 MG/L
MICROALBUMIN/CREAT UR-RTO: 15 MCG/MG CREAT

## 2023-02-20 PROCEDURE — 82043 UR ALBUMIN QUANTITATIVE: CPT

## 2023-02-20 PROCEDURE — 82570 ASSAY OF URINE CREATININE: CPT

## 2023-02-20 ASSESSMENT — PATIENT HEALTH QUESTIONNAIRE - PHQ9
2. FEELING DOWN, DEPRESSED OR HOPELESS: 0
SUM OF ALL RESPONSES TO PHQ QUESTIONS 1-9: 2
5. POOR APPETITE OR OVEREATING: 0
10. IF YOU CHECKED OFF ANY PROBLEMS, HOW DIFFICULT HAVE THESE PROBLEMS MADE IT FOR YOU TO DO YOUR WORK, TAKE CARE OF THINGS AT HOME, OR GET ALONG WITH OTHER PEOPLE: 0
SUM OF ALL RESPONSES TO PHQ QUESTIONS 1-9: 2
8. MOVING OR SPEAKING SO SLOWLY THAT OTHER PEOPLE COULD HAVE NOTICED. OR THE OPPOSITE, BEING SO FIGETY OR RESTLESS THAT YOU HAVE BEEN MOVING AROUND A LOT MORE THAN USUAL: 0
9. THOUGHTS THAT YOU WOULD BE BETTER OFF DEAD, OR OF HURTING YOURSELF: 0
1. LITTLE INTEREST OR PLEASURE IN DOING THINGS: 0
SUM OF ALL RESPONSES TO PHQ9 QUESTIONS 1 & 2: 0
3. TROUBLE FALLING OR STAYING ASLEEP: 1
SUM OF ALL RESPONSES TO PHQ QUESTIONS 1-9: 2
4. FEELING TIRED OR HAVING LITTLE ENERGY: 1
6. FEELING BAD ABOUT YOURSELF - OR THAT YOU ARE A FAILURE OR HAVE LET YOURSELF OR YOUR FAMILY DOWN: 0
SUM OF ALL RESPONSES TO PHQ QUESTIONS 1-9: 2
7. TROUBLE CONCENTRATING ON THINGS, SUCH AS READING THE NEWSPAPER OR WATCHING TELEVISION: 0

## 2023-02-20 ASSESSMENT — ENCOUNTER SYMPTOMS
SORE THROAT: 0
COUGH: 0
ABDOMINAL PAIN: 0
EYES NEGATIVE: 1

## 2023-02-20 NOTE — PROGRESS NOTES
[facility]  [facility] PRIMARY CARE [place] [address]  Dept: [phone]  Dept Fax: [phone]    Last encounter 11/14/2022    Diabetes (Last A1C was 9/19/22 7.7) and Anxiety       HPI:   Hina Mccracken is a 71 y.o. female who presentstoday for her medical conditions/complaints as noted below. Hina Mccracken is c/o of Diabetes (Last A1C was 9/19/22 7.7) and Anxiety      Diabetes  She presents for her follow-up diabetic visit. She has type 2 diabetes mellitus. No MedicAlert identification noted. The initial diagnosis of diabetes was made 5 years ago. Her disease course has been stable. Pertinent negatives for hypoglycemia include no dizziness, headaches or nervousness/anxiousness. Associated symptoms include foot paresthesias. Pertinent negatives for diabetes include no chest pain, no polydipsia and no polyphagia. There are no hypoglycemic complications. Symptoms are stable. Diabetic complications include peripheral neuropathy. Risk factors for coronary artery disease include diabetes mellitus, dyslipidemia, family history, hypertension, obesity, stress, sedentary lifestyle and post-menopausal. Current diabetic treatment includes diet and oral agent (dual therapy). She is compliant with treatment all of the time. Her weight is stable. She is following a diabetic and generally healthy diet. Meal planning includes avoidance of concentrated sweets. She has not had a previous visit with a dietitian. She participates in exercise three times a week. Her home blood glucose trend is fluctuating dramatically. Her breakfast blood glucose range is generally 130-140 mg/dl. An ACE inhibitor/angiotensin II receptor blocker is being taken. She does not see a podiatrist.Eye exam is not current. Established patient      November started to get med Janumet 50-1000mg  daily     BMI 33  Stay active walking is good  Water hydration 64-80 ounces. Htn well controlled  No chest pain, dizziness. Taking medication  Due for labs orders given. Hypercholesterolemia   Lab Results   Component Value Date    LDLCHOLESTEROL 84 10/14/2022     On statin atorvastatin      Anxiety loss spouse to stomach cancer has new special friend \"Ed\" who has several medical issues she is helping with. Moved into town and changed houses with son due to loneliness and also worry [place] by self. Has dog to keep her company now. Poor sleep habits. Napping during the day  On effexor which pt feels is effective.      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    shoulders and right leg    Osteopenia 2011    femoral necks jean -1.9, -1.8      Past Surgical History:   Procedure Laterality Date    CHOLECYSTECTOMY      age 35    COLON SURGERY      colon resection bowel blockage/adhesions-gangrene    COLONOSCOPY  2016    Mikey GORDON    COLONOSCOPY N/A 2021    COLONOSCOPY  repeat in 2 years , result polypectomy [facility]e Taylor [facility] [address] [facility]      x3 pulled    HYSTERECTOMY (CERVIX STATUS UNKNOWN)      kept ovaries       Family History   Problem Relation Age of Onset    Hypertension Mother     Dementia Mother     Hypertension Father     Heart Disease Father         stroke during heart surgery     High Cholesterol Father     Heart Disease Brother         cath with stents    Heart Attack Maternal Grandmother     Other Paternal Grandmother         cerbral aneurysm    Alcohol Abuse Brother     Cancer Brother         kidney       Social History     Tobacco Use    Smoking status: Never    Smokeless tobacco: Never    Tobacco comments:     hx smoking-6 years 1/2 ppd or less   Substance Use Topics    Alcohol use: No      Current Outpatient Medications   Medication Sig Dispense Refill    amLODIPine (NORVASC) 10 MG tablet TAKE 1 TABLET BY MOUTH DAILY 90 tablet 1    losartan (COZAAR) 100 MG tablet Take 1 tablet by mouth daily For hypertension/kidney protection with diabetes 90 tablet 1    venlafaxine (EFFEXOR XR) 75 MG extended release capsule TAKE 1 CAPSULE BY MOUTH DAILY 90 capsule 1    atorvastatin (LIPITOR) 20 MG tablet TAKE 1 TABLET BY MOUTH DAILY 90 tablet 1    Cholecalciferol (VITAMIN D3) 125 MCG (5000 UT) TABS Take by mouth daily      Calcium Carbonate-Vitamin D (CALCIUM PLUS VITAMIN D) 500-50 MG-UNIT CAPS Take 1 capsule by mouth daily 60 capsule     TGT PSYLLIUM FIBER PO Take 2 capsules by mouth daily      blood glucose test strips (ASCENSIA AUTODISC VI;ONE TOUCH ULTRA TEST VI) strip Test sugar Bid and as needed Dx E 11.9 100 each 3    Lancets MISC Testing Blood sugar once daily and as needed. 100 each 1     No current facility-administered medications for this visit. Allergies   Allergen Reactions    Penicillins Hives    Dust Mite Extract Other (See Comments)     headache       Health Maintenance   Topic Date Due    Shingles vaccine (2 of 3) 05/26/2014    Colorectal Cancer Screen  03/22/2023    Diabetic retinal exam  04/05/2023    Depression Monitoring  06/13/2023    Annual Wellness Visit (AWV)  06/14/2023    Lipids  10/14/2023    GFR test (Diabetes, CKD 3-4, OR last GFR 15-59)  10/14/2023    Diabetic foot exam  02/20/2024    A1C test (Diabetic or Prediabetic)  02/20/2024    Diabetic Alb to Cr ratio (uACR) test  02/20/2024    Breast cancer screen  11/07/2024    DTaP/Tdap/Td vaccine (2 - Td or Tdap) 02/19/2032    DEXA (modify frequency per FRAX score)  Completed    Flu vaccine  Completed    Pneumococcal 65+ years Vaccine  Completed    COVID-19 Vaccine  Completed    Hepatitis C screen  Completed    Hepatitis A vaccine  Aged Out    Hib vaccine  Aged Out    Meningococcal (ACWY) vaccine  Aged Out       Subjective:      Review of Systems   Constitutional:  Negative for chills and fever.    HENT:  Negative for congestion and sore throat. Eyes: Negative. Respiratory:  Negative for cough. Cardiovascular:  Negative for chest pain. Gastrointestinal:  Negative for abdominal pain. Endocrine: Negative for cold intolerance, heat intolerance, polydipsia and polyphagia. Genitourinary:  Negative for dysuria. Musculoskeletal:  Negative for neck pain. Neurological:  Negative for dizziness and headaches. Psychiatric/Behavioral:  The patient is not nervous/anxious. Objective:     Physical Exam  Vitals and nursing note reviewed. Constitutional:       General: She is not in acute distress. Appearance: Normal appearance. She is well-developed. HENT:      Head: Normocephalic and atraumatic. Right Ear: Tympanic membrane and external ear normal.      Left Ear: Tympanic membrane and external ear normal.      Nose: No congestion. Mouth/Throat:      Mouth: Mucous membranes are moist.      Pharynx: No oropharyngeal exudate or posterior oropharyngeal erythema. Eyes:      General: No scleral icterus. Pupils: Pupils are equal, round, and reactive to light. Cardiovascular:      Rate and Rhythm: Normal rate and regular rhythm. Heart sounds: Normal heart sounds. No murmur heard. Pulmonary:      Effort: Pulmonary effort is normal. No respiratory distress. Breath sounds: Normal breath sounds. No wheezing. Abdominal:      Palpations: Abdomen is soft. Tenderness: There is no abdominal tenderness. Musculoskeletal:         General: Normal range of motion. Cervical back: Normal range of motion and neck supple. Right lower leg: No edema. Left lower leg: No edema.       Comments: Diabetic Foot Exam    Deformities: No  Pulses:  normal,   Edema: abnormal - edema in left ankle (previous fx)  Skin lesions: normal  Callous:  No  Nails: normal    Sensory exam  Monofilament Sensation Left Foot: 9/10   Monofilament Sensation Right Foot: 8/10     Plus one of the following  Pinprick: Intact  Proprioception:  Intact  Vibration (128 Hz): Intact       Lymphadenopathy:      Cervical: No cervical adenopathy. Skin:     General: Skin is warm and dry. Neurological:      Mental Status: She is alert and oriented to person, place, and time. Psychiatric:         Behavior: Behavior normal.         Thought Content: Thought content normal.         Judgment: Judgment normal.   /88 (Site: Left Upper Arm, Position: Sitting)   Pulse 88   Ht 5' 3\" (1.6 m)   Wt 188 lb (85.3 kg)   LMP  (LMP Unknown) Comment: partial  SpO2 98%   BMI 33.30 kg/m²     Data:     Lab Results   Component Value Date/Time     10/14/2022 09:12 AM    K 3.9 10/14/2022 09:12 AM     10/14/2022 09:12 AM    CO2 25 10/14/2022 09:12 AM    BUN 14 10/14/2022 09:12 AM    CREATININE 0.66 10/14/2022 09:12 AM    GLUCOSE 183 10/14/2022 09:12 AM    GLUCOSE 179 04/12/2012 07:59 AM    PROT 7.3 10/14/2022 09:12 AM    LABALBU 4.0 10/14/2022 09:12 AM    LABALBU 4.0 04/12/2012 07:59 AM    BILITOT 0.3 10/14/2022 09:12 AM    ALKPHOS 123 10/14/2022 09:12 AM    AST 16 10/14/2022 09:12 AM    ALT 16 10/14/2022 09:12 AM     Lab Results   Component Value Date/Time    WBC 14.1 07/30/2021 08:05 AM    RBC 5.23 07/30/2021 08:05 AM    HGB 12.9 07/30/2021 08:05 AM    HCT 38.9 07/30/2021 08:05 AM    MCV 74.4 07/30/2021 08:05 AM    MCH 24.6 07/30/2021 08:05 AM    MCHC 33.1 07/30/2021 08:05 AM    RDW 15.6 07/30/2021 08:05 AM     07/30/2021 08:05 AM    MPV NOT REPORTED 07/30/2021 08:05 AM     Lab Results   Component Value Date/Time    TSH 2.39 10/14/2022 09:12 AM     Lab Results   Component Value Date/Time    CHOL 152 10/14/2022 09:12 AM    HDL 41 10/14/2022 09:12 AM    LABA1C 6.9 02/20/2023 12:15 PM          Assessment & Plan       1.  Type 2 diabetes, HbA1c goal < 7% ([facility])  Well controlled  Hemoglobin A1C   Date Value Ref Range Status   02/20/2023 6.9 % Final     Lab Results   Component Value Date    CREATININE 0.66 10/14/2022     Foot exam done today  - POCT glycosylated hemoglobin (Hb A1C)  - Microalbumin, Ur; Future  -  DIABETES FOOT EXAM    2. Essential hypertension, benign  Stable and controlled    - Comprehensive Metabolic Panel; Future    3. Anxiety about health  On effexor stable no suicidal ideations    4. Mixed hyperlipidemia  Continue statin well controlled  - Comprehensive Metabolic Panel; Future  - Lipid Panel; Future    5. Screening, anemia, deficiency, iron    - CBC with Auto Differential; Future    6. Osteopenia of both hips  On calcium and vitamin d3                       Completed Refills   Requested Prescriptions      No prescriptions requested or ordered in this encounter     No follow-ups on file. No orders of the defined types were placed in this encounter. Orders Placed This Encounter   Procedures    Microalbumin, Ur     Standing Status:   Future     Number of Occurrences:   1     Standing Expiration Date:   2/20/2024    CBC with Auto Differential     Standing Status:   Future     Standing Expiration Date:   2/20/2024    Comprehensive Metabolic Panel     Standing Status:   Future     Standing Expiration Date:   2/20/2024    Lipid Panel     Standing Status:   Future     Standing Expiration Date:   2/20/2024     Order Specific Question:   Is Patient Fasting?/# of Hours     Answer:   yes    POCT glycosylated hemoglobin (Hb A1C)     DIABETES FOOT EXAM                  On this date 2/20/2023 I have spent 26 minutes reviewing previous notes, test results and face to face with the patient discussing the diagnosis and importance of compliance with the treatment plan as well as documenting on the day of the visit.      Electronically signed by DANA Reyna CNP on 2/20/2023 at 11:32 PM

## 2023-02-20 NOTE — PATIENT INSTRUCTIONS
Phone: [phone]  Fax: [phone] [facility] Office Hours:  Monday: [facility] office location 8-5 ([phone]) Offering additional late hours the first Monday of the month until 7 pm.   Tuesday: 8-5 Wednesday: 8-5 Thursday:  Additional hours offered 2 Thursdays a month. Please call to inquire those dates. Fridays: 7:30-4:30   SURVEY:    You may be receiving a survey from [employer] regarding your visit today. Please complete the survey to enable us to provide the highest quality of care to you and your family. If you cannot score us a very good on any question, please call the office to discuss how we could have made your experience a very good one. Thank you.

## 2023-02-21 ENCOUNTER — CARE COORDINATION (OUTPATIENT)
Dept: CARE COORDINATION | Age: 73
End: 2023-02-21

## 2023-02-21 NOTE — CARE COORDINATION
Incoming call from patient with questions regarding best options when eating out, discussed healthy choices- grilled/baked meats fish/chicken and vegetables. Patient had questions regarding sodium in foods when eating out- encouraged her to ask at restaurant for them not to add extra salt to foods. We discussed reading food labels- what to look for to limit sodium intake. Will mail patient nutrition information about healthy choices when eating out and high sodium foods to avoid/limit. Patient has contact information to call with any further questions.   SHAYNA Eason

## 2023-05-11 ENCOUNTER — OFFICE VISIT (OUTPATIENT)
Dept: PRIMARY CARE CLINIC | Age: 73
End: 2023-05-11
Payer: MEDICARE

## 2023-05-11 VITALS
DIASTOLIC BLOOD PRESSURE: 78 MMHG | WEIGHT: 195 LBS | SYSTOLIC BLOOD PRESSURE: 128 MMHG | HEART RATE: 85 BPM | BODY MASS INDEX: 34.55 KG/M2 | HEIGHT: 63 IN | OXYGEN SATURATION: 95 %

## 2023-05-11 DIAGNOSIS — M25.562 LEFT MEDIAL KNEE PAIN: ICD-10-CM

## 2023-05-11 DIAGNOSIS — E08.8 DIABETES MELLITUS DUE TO UNDERLYING CONDITION WITH COMPLICATION, WITHOUT LONG-TERM CURRENT USE OF INSULIN (HCC): ICD-10-CM

## 2023-05-11 DIAGNOSIS — M15.9 PRIMARY OSTEOARTHRITIS INVOLVING MULTIPLE JOINTS: Primary | ICD-10-CM

## 2023-05-11 PROCEDURE — G8417 CALC BMI ABV UP PARAM F/U: HCPCS | Performed by: NURSE PRACTITIONER

## 2023-05-11 PROCEDURE — 99213 OFFICE O/P EST LOW 20 MIN: CPT | Performed by: NURSE PRACTITIONER

## 2023-05-11 PROCEDURE — 3078F DIAST BP <80 MM HG: CPT | Performed by: NURSE PRACTITIONER

## 2023-05-11 PROCEDURE — 3017F COLORECTAL CA SCREEN DOC REV: CPT | Performed by: NURSE PRACTITIONER

## 2023-05-11 PROCEDURE — G8399 PT W/DXA RESULTS DOCUMENT: HCPCS | Performed by: NURSE PRACTITIONER

## 2023-05-11 PROCEDURE — 3074F SYST BP LT 130 MM HG: CPT | Performed by: NURSE PRACTITIONER

## 2023-05-11 PROCEDURE — 1036F TOBACCO NON-USER: CPT | Performed by: NURSE PRACTITIONER

## 2023-05-11 PROCEDURE — 1090F PRES/ABSN URINE INCON ASSESS: CPT | Performed by: NURSE PRACTITIONER

## 2023-05-11 PROCEDURE — G8427 DOCREV CUR MEDS BY ELIG CLIN: HCPCS | Performed by: NURSE PRACTITIONER

## 2023-05-11 PROCEDURE — 1123F ACP DISCUSS/DSCN MKR DOCD: CPT | Performed by: NURSE PRACTITIONER

## 2023-05-11 ASSESSMENT — ENCOUNTER SYMPTOMS
RHINORRHEA: 0
ABDOMINAL DISTENTION: 0
EYES NEGATIVE: 1
COUGH: 0
SORE THROAT: 0

## 2023-05-11 NOTE — PROGRESS NOTES
HPI Notes    Name: Jenifer Chandra  : 1950         Chief Complaint:     Chief Complaint   Patient presents with    Knee Pain     Left knee pain x 2-3 months. Currently rates 2/10       History of Present Illness:        Knee Pain     Left knee pain on and off for 2 months. Hx injury 3-5 years ago. No knee surgeries. Pain with going up steps  Not with going down. Suspect osteoarthritis advanced in medial knee area. Denies warmth minimal swelling. Feels gait is altered some with knee pain. Type 2 DM well controlled, htn, anxiety, hyperlipidemia         Past Medical History:     Past Medical History:   Diagnosis Date    Anxiety     Hyperlipidemia     Hypertension     onset early 46'F    Lichen simplex 1646    shoulders and right leg    Osteopenia 2011    femoral necks jean -1.9, -1.8      Reviewed all health maintenance requirements and ordered appropriate tests  Health Maintenance Due   Topic Date Due    Shingles vaccine (2 of 3) 2014    Colorectal Cancer Screen  2023    Diabetic retinal exam  2023       Past Surgical History:     Past Surgical History:   Procedure Laterality Date    CHOLECYSTECTOMY      age 35    COLON SURGERY      colon resection bowel blockage/adhesions-gangrene    COLONOSCOPY  2016    Mikey GORDON    COLONOSCOPY N/A 2021    COLONOSCOPY  repeat in 2 years , result polypectomy Amadeo Pascal MD at [address]      x3 pulled    HYSTERECTOMY (CERVIX STATUS UNKNOWN)      kept ovaries        Medications:       Prior to Admission medications    Medication Sig Start Date End Date Taking?  Authorizing Provider   amLODIPine (NORVASC) 10 MG tablet TAKE 1 TABLET BY MOUTH DAILY 23  Yes DANA Angulo CNP   losartan (COZAAR) 100 MG tablet Take 1 tablet by mouth daily For hypertension/kidney protection with diabetes 23  Yes DANA Angulo CNP   venlafaxine (EFFEXOR XR) 75 MG extended release capsule TAKE 1

## 2023-05-11 NOTE — PATIENT INSTRUCTIONS
Phone: [phone]  Fax: [phone] Office Hours:  Monday: [facility] office location 8-5 ([phone]) Offering additional late hours the first Monday of the month until 7 pm.   Tuesday: 8-5 Wednesday: 8-5 Thursday:  Additional hours offered 2 Thursdays a month. Please call to inquire those dates. Fridays: 7:30-4:30   SURVEY:    You may be receiving a survey from [employer] regarding your visit today. Please complete the survey to enable us to provide the highest quality of care to you and your family. If you cannot score us a very good on any question, please call the office to discuss how we could have made your experience a very good one. Thank you.

## 2023-05-16 ENCOUNTER — HOSPITAL ENCOUNTER (OUTPATIENT)
Age: 73
Discharge: HOME | End: 2023-05-18
Payer: MEDICARE

## 2023-05-16 ENCOUNTER — HOSPITAL ENCOUNTER (OUTPATIENT)
Dept: GENERAL RADIOLOGY | Age: 73
Discharge: HOME | End: 2023-05-18
Payer: MEDICARE

## 2023-05-16 DIAGNOSIS — M15.9 PRIMARY OSTEOARTHRITIS INVOLVING MULTIPLE JOINTS: ICD-10-CM

## 2023-05-16 PROCEDURE — 73564 X-RAY EXAM KNEE 4 OR MORE: CPT

## 2023-05-19 DIAGNOSIS — M15.9 PRIMARY OSTEOARTHRITIS INVOLVING MULTIPLE JOINTS: Primary | ICD-10-CM

## 2023-05-19 DIAGNOSIS — G89.29 CHRONIC PAIN OF LEFT KNEE: ICD-10-CM

## 2023-05-19 DIAGNOSIS — M25.562 CHRONIC PAIN OF LEFT KNEE: ICD-10-CM

## 2023-08-02 DIAGNOSIS — I10 ESSENTIAL HYPERTENSION, BENIGN: ICD-10-CM

## 2023-08-02 DIAGNOSIS — F41.9 ANXIETY AND DEPRESSION: ICD-10-CM

## 2023-08-02 DIAGNOSIS — F32.A ANXIETY AND DEPRESSION: ICD-10-CM

## 2023-08-02 DIAGNOSIS — E78.00 PURE HYPERCHOLESTEROLEMIA: ICD-10-CM

## 2023-08-02 NOTE — TELEPHONE ENCOUNTER
Last OV: 5/11/2023  Last RX: 2/1/2023   Next scheduled apt: 11/7/2023      Surescripts requesting refill

## 2023-08-04 RX ORDER — VENLAFAXINE HYDROCHLORIDE 75 MG/1
75 CAPSULE, EXTENDED RELEASE ORAL DAILY
Qty: 90 CAPSULE | Refills: 1 | Status: SHIPPED | OUTPATIENT
Start: 2023-08-04

## 2023-08-04 RX ORDER — AMLODIPINE BESYLATE 10 MG/1
10 TABLET ORAL DAILY
Qty: 90 TABLET | Refills: 1 | Status: SHIPPED | OUTPATIENT
Start: 2023-08-04

## 2023-08-04 RX ORDER — ATORVASTATIN CALCIUM 20 MG/1
20 TABLET, FILM COATED ORAL DAILY
Qty: 90 TABLET | Refills: 1 | Status: SHIPPED | OUTPATIENT
Start: 2023-08-04

## 2023-08-04 RX ORDER — LOSARTAN POTASSIUM 100 MG/1
100 TABLET ORAL DAILY
Qty: 90 TABLET | Refills: 1 | Status: SHIPPED | OUTPATIENT
Start: 2023-08-04

## 2023-08-30 ENCOUNTER — TELEPHONE (OUTPATIENT)
Dept: PRIMARY CARE CLINIC | Age: 73
End: 2023-08-30

## 2023-08-30 NOTE — TELEPHONE ENCOUNTER
WILFRIDOI: Pt called in and canceled her 11:20 appt today stating [facility] called her back and told her the BS could be elevated due to the surgery and the trauma of the cataract surgery to the eye. She states if it continues she will call back to reschedule.        Last OV: 5/11/2023    Next scheduled apt: 11/7/2023

## 2023-10-31 DIAGNOSIS — F32.A ANXIETY AND DEPRESSION: ICD-10-CM

## 2023-10-31 DIAGNOSIS — F41.9 ANXIETY AND DEPRESSION: ICD-10-CM

## 2023-11-01 RX ORDER — VENLAFAXINE HYDROCHLORIDE 75 MG/1
75 CAPSULE, EXTENDED RELEASE ORAL DAILY
Qty: 90 CAPSULE | Refills: 1 | Status: SHIPPED | OUTPATIENT
Start: 2023-11-01

## 2023-11-01 NOTE — TELEPHONE ENCOUNTER
Last OV: 5/11/2023  Last RX: 8/4/2023   Next scheduled apt: 11/8/2023      Surescripts requesting refill

## 2023-11-08 ENCOUNTER — OFFICE VISIT (OUTPATIENT)
Dept: PRIMARY CARE CLINIC | Age: 73
End: 2023-11-08

## 2023-11-08 ENCOUNTER — HOSPITAL ENCOUNTER (OUTPATIENT)
Age: 73
Setting detail: SPECIMEN
Discharge: HOME | End: 2023-11-08
Payer: MEDICARE

## 2023-11-08 VITALS
WEIGHT: 192 LBS | OXYGEN SATURATION: 98 % | BODY MASS INDEX: 34.02 KG/M2 | DIASTOLIC BLOOD PRESSURE: 80 MMHG | HEART RATE: 88 BPM | SYSTOLIC BLOOD PRESSURE: 136 MMHG | HEIGHT: 63 IN

## 2023-11-08 DIAGNOSIS — Z86.010 HX OF COLONIC POLYPS: ICD-10-CM

## 2023-11-08 DIAGNOSIS — Z00.00 MEDICARE ANNUAL WELLNESS VISIT, SUBSEQUENT: Primary | ICD-10-CM

## 2023-11-08 DIAGNOSIS — Z13.29 SCREENING FOR THYROID DISORDER: ICD-10-CM

## 2023-11-08 DIAGNOSIS — E08.8 DIABETES MELLITUS DUE TO UNDERLYING CONDITION WITH COMPLICATION, WITHOUT LONG-TERM CURRENT USE OF INSULIN (HCC): ICD-10-CM

## 2023-11-08 DIAGNOSIS — Z12.11 SCREENING FOR COLON CANCER: ICD-10-CM

## 2023-11-08 DIAGNOSIS — R73.9 HYPERGLYCEMIA: ICD-10-CM

## 2023-11-08 DIAGNOSIS — Z13.820 SCREENING FOR OSTEOPOROSIS: ICD-10-CM

## 2023-11-08 DIAGNOSIS — Z78.0 POSTMENOPAUSAL ESTROGEN DEFICIENCY: ICD-10-CM

## 2023-11-08 DIAGNOSIS — Z13.0 SCREENING, ANEMIA, DEFICIENCY, IRON: ICD-10-CM

## 2023-11-08 DIAGNOSIS — M81.0 OSTEOPOROSIS WITHOUT CURRENT PATHOLOGICAL FRACTURE, UNSPECIFIED OSTEOPOROSIS TYPE: ICD-10-CM

## 2023-11-08 DIAGNOSIS — Z12.31 SCREENING MAMMOGRAM, ENCOUNTER FOR: ICD-10-CM

## 2023-11-08 DIAGNOSIS — E55.9 VITAMIN D DEFICIENCY: ICD-10-CM

## 2023-11-08 DIAGNOSIS — E78.00 HYPERCHOLESTEROLEMIA: ICD-10-CM

## 2023-11-08 DIAGNOSIS — N30.01 ACUTE CYSTITIS WITH HEMATURIA: ICD-10-CM

## 2023-11-08 LAB
BACTERIA URNS QL MICRO: ABNORMAL
BILIRUB UR QL STRIP: NEGATIVE
CLARITY UR: ABNORMAL
COLOR UR: YELLOW
EPI CELLS #/AREA URNS HPF: ABNORMAL /HPF (ref 0–25)
GLUCOSE UR STRIP-MCNC: NEGATIVE MG/DL
HBA1C MFR BLD: 8.4 %
HGB UR QL STRIP.AUTO: NEGATIVE
KETONES UR STRIP-MCNC: NEGATIVE MG/DL
LEUKOCYTE ESTERASE UR QL STRIP: NEGATIVE
NITRITE UR QL STRIP: POSITIVE
PH UR STRIP: 5.5 [PH] (ref 5–9)
PROT UR STRIP-MCNC: NEGATIVE MG/DL
RBC #/AREA URNS HPF: ABNORMAL /HPF (ref 0–2)
SP GR UR STRIP: 1.02 (ref 1.01–1.02)
UROBILINOGEN UR STRIP-ACNC: NORMAL EU/DL (ref 0–1)
WBC #/AREA URNS HPF: ABNORMAL /HPF (ref 0–5)

## 2023-11-08 PROCEDURE — 81001 URINALYSIS AUTO W/SCOPE: CPT

## 2023-11-08 RX ORDER — SITAGLIPTIN AND METFORMIN HYDROCHLORIDE 1000; 50 MG/1; MG/1
1 TABLET, FILM COATED ORAL 2 TIMES DAILY WITH MEALS
Qty: 180 TABLET | Refills: 3 | Status: SHIPPED | OUTPATIENT
Start: 2023-11-08

## 2023-11-08 SDOH — ECONOMIC STABILITY: FOOD INSECURITY: WITHIN THE PAST 12 MONTHS, YOU WORRIED THAT YOUR FOOD WOULD RUN OUT BEFORE YOU GOT MONEY TO BUY MORE.: NEVER TRUE

## 2023-11-08 SDOH — ECONOMIC STABILITY: INCOME INSECURITY: HOW HARD IS IT FOR YOU TO PAY FOR THE VERY BASICS LIKE FOOD, HOUSING, MEDICAL CARE, AND HEATING?: NOT HARD AT ALL

## 2023-11-08 SDOH — ECONOMIC STABILITY: FOOD INSECURITY: WITHIN THE PAST 12 MONTHS, THE FOOD YOU BOUGHT JUST DIDN'T LAST AND YOU DIDN'T HAVE MONEY TO GET MORE.: NEVER TRUE

## 2023-11-08 ASSESSMENT — PATIENT HEALTH QUESTIONNAIRE - PHQ9
SUM OF ALL RESPONSES TO PHQ QUESTIONS 1-9: 1
2. FEELING DOWN, DEPRESSED OR HOPELESS: 0
6. FEELING BAD ABOUT YOURSELF - OR THAT YOU ARE A FAILURE OR HAVE LET YOURSELF OR YOUR FAMILY DOWN: 0
SUM OF ALL RESPONSES TO PHQ QUESTIONS 1-9: 1
SUM OF ALL RESPONSES TO PHQ9 QUESTIONS 1 & 2: 1
8. MOVING OR SPEAKING SO SLOWLY THAT OTHER PEOPLE COULD HAVE NOTICED. OR THE OPPOSITE, BEING SO FIGETY OR RESTLESS THAT YOU HAVE BEEN MOVING AROUND A LOT MORE THAN USUAL: 0
7. TROUBLE CONCENTRATING ON THINGS, SUCH AS READING THE NEWSPAPER OR WATCHING TELEVISION: 0
SUM OF ALL RESPONSES TO PHQ QUESTIONS 1-9: 1
10. IF YOU CHECKED OFF ANY PROBLEMS, HOW DIFFICULT HAVE THESE PROBLEMS MADE IT FOR YOU TO DO YOUR WORK, TAKE CARE OF THINGS AT HOME, OR GET ALONG WITH OTHER PEOPLE: 0
1. LITTLE INTEREST OR PLEASURE IN DOING THINGS: 1
4. FEELING TIRED OR HAVING LITTLE ENERGY: 0
5. POOR APPETITE OR OVEREATING: 0
3. TROUBLE FALLING OR STAYING ASLEEP: 0
9. THOUGHTS THAT YOU WOULD BE BETTER OFF DEAD, OR OF HURTING YOURSELF: 0
SUM OF ALL RESPONSES TO PHQ QUESTIONS 1-9: 1

## 2023-11-08 ASSESSMENT — LIFESTYLE VARIABLES
HOW OFTEN DO YOU HAVE A DRINK CONTAINING ALCOHOL: 2-4 TIMES A MONTH
HOW MANY STANDARD DRINKS CONTAINING ALCOHOL DO YOU HAVE ON A TYPICAL DAY: 1 OR 2

## 2023-11-09 RX ORDER — NITROFURANTOIN 25; 75 MG/1; MG/1
100 CAPSULE ORAL 2 TIMES DAILY
Qty: 20 CAPSULE | Refills: 0 | Status: SHIPPED | OUTPATIENT
Start: 2023-11-09 | End: 2023-11-19

## 2023-11-20 ENCOUNTER — HOSPITAL ENCOUNTER (OUTPATIENT)
Age: 73
Discharge: HOME | End: 2023-11-20
Payer: MEDICARE

## 2023-11-20 DIAGNOSIS — R73.9 HYPERGLYCEMIA: ICD-10-CM

## 2023-11-20 DIAGNOSIS — Z13.0 SCREENING, ANEMIA, DEFICIENCY, IRON: ICD-10-CM

## 2023-11-20 DIAGNOSIS — E55.9 VITAMIN D DEFICIENCY: ICD-10-CM

## 2023-11-20 DIAGNOSIS — E78.00 HYPERCHOLESTEROLEMIA: ICD-10-CM

## 2023-11-20 DIAGNOSIS — Z13.29 SCREENING FOR THYROID DISORDER: ICD-10-CM

## 2023-11-20 LAB
25(OH)D3 SERPL-MCNC: 35.7 NG/ML (ref 30–100)
BASOPHILS # BLD: 0.05 K/UL (ref 0–0.2)
BASOPHILS NFR BLD: 0 % (ref 0–2)
CHOLEST SERPL-MCNC: 143 MG/DL (ref 0–199)
CHOLESTEROL/HDL RATIO: 3
EOSINOPHIL # BLD: 0.47 K/UL (ref 0–0.4)
EOSINOPHILS RELATIVE PERCENT: 4 % (ref 0–5)
ERYTHROCYTE [DISTWIDTH] IN BLOOD BY AUTOMATED COUNT: 14.1 % (ref 12.1–15.2)
HCT VFR BLD AUTO: 40 % (ref 36–46)
HDLC SERPL-MCNC: 41 MG/DL
HGB BLD-MCNC: 12.9 G/DL (ref 12–16)
IMM GRANULOCYTES # BLD AUTO: 0.03 K/UL (ref 0–0.3)
IMM GRANULOCYTES NFR BLD: 0 % (ref 0–5)
LDLC SERPL CALC-MCNC: 72 MG/DL (ref 0–100)
LYMPHOCYTES NFR BLD: 2.78 K/UL (ref 1–4.8)
LYMPHOCYTES RELATIVE PERCENT: 23 % (ref 15–40)
MCH RBC QN AUTO: 24.2 PG (ref 26–34)
MCHC RBC AUTO-ENTMCNC: 32.3 G/DL (ref 31–37)
MCV RBC AUTO: 75.2 FL (ref 80–100)
MONOCYTES NFR BLD: 0.61 K/UL (ref 0–1)
MONOCYTES NFR BLD: 5 % (ref 4–8)
NEUTROPHILS NFR BLD: 67 % (ref 47–75)
NEUTS SEG NFR BLD: 8.04 K/UL (ref 2.5–7)
PLATELET # BLD AUTO: 357 K/UL (ref 140–450)
PMV BLD AUTO: 10.6 FL (ref 6–12)
RBC # BLD AUTO: 5.32 M/UL (ref 4–5.2)
TRIGL SERPL-MCNC: 148 MG/DL (ref 0–149)
TSH SERPL DL<=0.05 MIU/L-ACNC: 2.79 UIU/ML (ref 0.3–5)
VLDLC SERPL CALC-MCNC: 30 MG/DL
WBC OTHER # BLD: 12 K/UL (ref 3.5–11)

## 2023-11-20 PROCEDURE — 82306 VITAMIN D 25 HYDROXY: CPT

## 2023-11-20 PROCEDURE — 80061 LIPID PANEL: CPT

## 2023-11-20 PROCEDURE — 36415 COLL VENOUS BLD VENIPUNCTURE: CPT

## 2023-11-20 PROCEDURE — 85025 COMPLETE CBC W/AUTO DIFF WBC: CPT

## 2023-11-20 PROCEDURE — 84443 ASSAY THYROID STIM HORMONE: CPT

## 2023-11-22 ENCOUNTER — HOSPITAL ENCOUNTER (OUTPATIENT)
Age: 73
Discharge: HOME | End: 2023-11-22
Payer: MEDICARE

## 2023-11-22 DIAGNOSIS — D72.828 OTHER ELEVATED WHITE BLOOD CELL COUNT: Primary | ICD-10-CM

## 2023-11-22 DIAGNOSIS — D72.828 OTHER ELEVATED WHITE BLOOD CELL COUNT: ICD-10-CM

## 2023-11-22 LAB
-: ABNORMAL
BACTERIA URNS QL MICRO: ABNORMAL
BILIRUB UR QL STRIP: NEGATIVE
CLARITY UR: CLEAR
COLOR UR: YELLOW
COMMENT: ABNORMAL
CRYSTALS URNS MICRO: ABNORMAL /HPF
CRYSTALS URNS MICRO: ABNORMAL /HPF
EPI CELLS #/AREA URNS HPF: ABNORMAL /HPF
GLUCOSE UR STRIP-MCNC: NEGATIVE MG/DL
HGB UR QL STRIP.AUTO: NEGATIVE
KETONES UR STRIP-MCNC: ABNORMAL MG/DL
LEUKOCYTE ESTERASE UR QL STRIP: NEGATIVE
NITRITE UR QL STRIP: NEGATIVE
PH UR STRIP: 5 [PH] (ref 5–8)
PROT UR STRIP-MCNC: ABNORMAL MG/DL
RBC #/AREA URNS HPF: ABNORMAL /HPF (ref 0–2)
SP GR UR STRIP: 1.02 (ref 1–1.03)
UROBILINOGEN UR STRIP-ACNC: NORMAL EU/DL (ref 0–1)
WBC #/AREA URNS HPF: ABNORMAL /HPF

## 2023-11-22 PROCEDURE — 81001 URINALYSIS AUTO W/SCOPE: CPT

## 2023-12-12 ENCOUNTER — TELEPHONE (OUTPATIENT)
Dept: FAMILY MEDICINE CLINIC | Age: 73
End: 2023-12-12

## 2023-12-12 NOTE — TELEPHONE ENCOUNTER
Pt called in stating she will take the last of her janumet on Thursday. She stated the provider could give a verbal order and they could send her a 1 time supply.      I will check with company tomorrow about refills, or if they have current script

## 2023-12-13 NOTE — TELEPHONE ENCOUNTER
Unsure what issue is again, she has had this the last 2 years. Did she fill out the application for the janumet which is due every fall. May need to ask [employer] for assistance.      Isabel Sanchez

## 2023-12-13 NOTE — TELEPHONE ENCOUNTER
Attempted to call pt, phone went straight to . Spoke with son Yakelin Vasquez, and he is going to get in contact with her and see what he can find out.  Yakelin Vasquez stated he will have pt contact office

## 2023-12-13 NOTE — TELEPHONE ENCOUNTER
Spoke with [facility] and they accepted verbal order for Janumet 50-1,000mg 180 tabs- 90 day supply. Pt was informed if she is almost out of medication she can call pharmacy at [phone] and have meds expedited and shipped overnight. [facility] confirmed they received pt's new application, and they sent out an attestation form 11/17, and pt states she filled it out and mailed it back to them.  Pt not required to do anything else at this time

## 2023-12-14 ENCOUNTER — HOSPITAL ENCOUNTER (OUTPATIENT)
Dept: MAMMOGRAPHY | Age: 73
Discharge: HOME | End: 2023-12-16
Payer: MEDICARE

## 2023-12-14 ENCOUNTER — HOSPITAL ENCOUNTER (OUTPATIENT)
Dept: BONE DENSITY | Age: 73
Discharge: HOME | End: 2023-12-16
Payer: MEDICARE

## 2023-12-14 DIAGNOSIS — Z12.31 SCREENING MAMMOGRAM, ENCOUNTER FOR: ICD-10-CM

## 2023-12-14 DIAGNOSIS — Z13.820 SCREENING FOR OSTEOPOROSIS: ICD-10-CM

## 2023-12-14 DIAGNOSIS — Z78.0 POSTMENOPAUSAL ESTROGEN DEFICIENCY: ICD-10-CM

## 2023-12-14 PROCEDURE — 77080 DXA BONE DENSITY AXIAL: CPT

## 2023-12-14 PROCEDURE — 77063 BREAST TOMOSYNTHESIS BI: CPT

## 2024-01-25 DIAGNOSIS — I10 ESSENTIAL HYPERTENSION, BENIGN: ICD-10-CM

## 2024-01-25 DIAGNOSIS — E78.00 PURE HYPERCHOLESTEROLEMIA: ICD-10-CM

## 2024-01-25 RX ORDER — AMLODIPINE BESYLATE 10 MG/1
10 TABLET ORAL DAILY
Qty: 90 TABLET | Refills: 1 | Status: SHIPPED | OUTPATIENT
Start: 2024-01-25

## 2024-01-25 RX ORDER — LOSARTAN POTASSIUM 100 MG/1
100 TABLET ORAL DAILY
Qty: 90 TABLET | Refills: 1 | Status: SHIPPED | OUTPATIENT
Start: 2024-01-25

## 2024-01-25 RX ORDER — ATORVASTATIN CALCIUM 20 MG/1
20 TABLET, FILM COATED ORAL DAILY
Qty: 90 TABLET | Refills: 1 | Status: SHIPPED | OUTPATIENT
Start: 2024-01-25

## 2024-01-25 NOTE — TELEPHONE ENCOUNTER
Last OV: 11/8/2023  Last RX: 8/4/2023   Next scheduled apt: 2/16/2024      Surescripts requesting refill

## 2024-02-16 ENCOUNTER — OFFICE VISIT (OUTPATIENT)
Dept: PRIMARY CARE CLINIC | Age: 74
End: 2024-02-16
Payer: MEDICARE

## 2024-02-16 ENCOUNTER — HOSPITAL ENCOUNTER (OUTPATIENT)
Age: 74
Setting detail: SPECIMEN
Discharge: HOME | End: 2024-02-16
Payer: MEDICARE

## 2024-02-16 VITALS
OXYGEN SATURATION: 95 % | DIASTOLIC BLOOD PRESSURE: 78 MMHG | SYSTOLIC BLOOD PRESSURE: 134 MMHG | HEIGHT: 63 IN | WEIGHT: 186 LBS | HEART RATE: 103 BPM | BODY MASS INDEX: 32.96 KG/M2

## 2024-02-16 DIAGNOSIS — F41.9 ANXIETY AND DEPRESSION: ICD-10-CM

## 2024-02-16 DIAGNOSIS — E11.9 TYPE 2 DIABETES, HBA1C GOAL < 7% (HCC): ICD-10-CM

## 2024-02-16 DIAGNOSIS — Z01.419 WELL WOMAN EXAM WITH ROUTINE GYNECOLOGICAL EXAM: Primary | ICD-10-CM

## 2024-02-16 DIAGNOSIS — Z01.419 WELL WOMAN EXAM WITH ROUTINE GYNECOLOGICAL EXAM: ICD-10-CM

## 2024-02-16 DIAGNOSIS — E08.8 DIABETES MELLITUS DUE TO UNDERLYING CONDITION WITH COMPLICATION, WITHOUT LONG-TERM CURRENT USE OF INSULIN (HCC): ICD-10-CM

## 2024-02-16 DIAGNOSIS — F32.A ANXIETY AND DEPRESSION: ICD-10-CM

## 2024-02-16 LAB
ALBUMIN SERPL-MCNC: 4.2 G/DL (ref 3.5–5.2)
ALBUMIN/GLOB SERPL: 1.1 {RATIO} (ref 1–2.5)
ALP SERPL-CCNC: 109 U/L (ref 35–104)
ALT SERPL-CCNC: 18 U/L (ref 5–33)
ANION GAP SERPL CALCULATED.3IONS-SCNC: 14 MMOL/L (ref 9–17)
AST SERPL-CCNC: 22 U/L
BILIRUB SERPL-MCNC: 0.2 MG/DL (ref 0.3–1.2)
BUN SERPL-MCNC: 14 MG/DL (ref 8–23)
BUN/CREAT SERPL: 18 (ref 9–20)
C TRACH DNA SPEC QL PROBE+SIG AMP: NORMAL
CALCIUM SERPL-MCNC: 10.1 MG/DL (ref 8.6–10.4)
CHLORIDE SERPL-SCNC: 104 MMOL/L (ref 98–107)
CO2 SERPL-SCNC: 20 MMOL/L (ref 20–31)
CREAT SERPL-MCNC: 0.8 MG/DL (ref 0.5–0.9)
GFR SERPL CREATININE-BSD FRML MDRD: >60 ML/MIN/1.73M2
GLUCOSE SERPL-MCNC: 149 MG/DL (ref 70–99)
HBA1C MFR BLD: 7.4 %
N GONORRHOEA DNA SPEC QL PROBE+SIG AMP: NORMAL
POTASSIUM SERPL-SCNC: 4.3 MMOL/L (ref 3.7–5.3)
PROT SERPL-MCNC: 7.9 G/DL (ref 6.4–8.3)
SODIUM SERPL-SCNC: 138 MMOL/L (ref 135–144)
SPECIMEN DESCRIPTION: NORMAL

## 2024-02-16 PROCEDURE — 87591 N.GONORRHOEAE DNA AMP PROB: CPT

## 2024-02-16 PROCEDURE — 87491 CHLMYD TRACH DNA AMP PROBE: CPT

## 2024-02-16 PROCEDURE — 87070 CULTURE OTHR SPECIMN AEROBIC: CPT

## 2024-02-16 PROCEDURE — 80053 COMPREHEN METABOLIC PANEL: CPT

## 2024-02-16 PROCEDURE — 83036 HEMOGLOBIN GLYCOSYLATED A1C: CPT | Performed by: NURSE PRACTITIONER

## 2024-02-16 NOTE — PATIENT INSTRUCTIONS
THANK YOU FOR TRUSTING US WITH YOUR HEALTHCARE !!    The associates caring for you today were:    Family Practice Provider: Krupa CORTEZ-CYNDI    Clinical Team Nurse: Elenita Sierra LPN    Clinical Team Medical Assistant: Estella Kirby MA    Our goal is to provide you with EXCEPTIONAL patient care, and we strive to do this for EVERY patient, EVERY visit. Since we care about you and your experience, you may receive a patient satisfaction survey from [employer] by postal mail/email/text. We truly welcome your feedback and ask that you complete the survey to let us know how we are doing.    Important Numbers:  [facility] phone [phone]   [place] [facility] Nurse/MA Line: [phone]  Fax  [phone]   Central [phone]  Billing questions: [phone]  Medical Records Request: [phone]    Manage your healthcare  with [facility] My[facility]. To activate your account, visit [email or web address]   DIGITAL scheduling available now through my chart.  Schedule your next appointment at your convenience through your my chart.       [place] Office Hours:  Monday: [place] office location 8-5 ([phone]) Offering additional late hours the first Monday of the month until 7 pm.   Tuesday: 8: :  812 Noon, closed  of the month   : 7:30-4:30       SURVEY:    You may be receiving a survey from [employer] regarding your visit today.    Please complete the survey to enable us to provide the highest quality of care to you and your family.    If you cannot score us a very good on any question, please call the office to discuss how we could have made your experience a very good one.    Thank you.

## 2024-02-16 NOTE — PROGRESS NOTES
[facility] PRIMARY CARE [place]  [address] [address]  Dept: [phone]  Dept Fax: [phone]    Last encounter 2023    Gynecologic Exam (Pt states no concerns at this time) and Diabetes (A1C)       HPI:   Krupa Abrams is a 73 y.o. female who presentstoday for her medical conditions/complaints as noted below.  Krupa Abrams is c/o of Gynecologic Exam (Pt states no concerns at this time) and Diabetes (A1C)      HPI  Established patient      Pt here for well woman exam   A0  Vaginal deliveries   Did not breastfeed.       passed 3/16/2016.   New male partner. Did not use condoms   Had erectile dysfunction    Postmenopausal   Hysterectomy: Partial age 38     History of: ovarian cyst   History of: hyperplasia endometriosis   Date of prior pap:    No prior abnormal findings     Denies any discharge       Reviewed prior notes None  Reviewed previous Labs, Imaging, and Hospital Records    Past Medical History:   Diagnosis Date    Anxiety     Hyperlipidemia     Hypertension     onset early 40s    Lichen simplex     shoulders and right leg    Osteopenia 2011    femoral necks jean -1.9, -1.8    Postmenopausal osteoporosis of multiple sites 2023    hips -2.6      Past Surgical History:   Procedure Laterality Date    CHOLECYSTECTOMY      age 30    COLON SURGERY      colon resection bowel blockage/adhesions-gangrene    COLONOSCOPY  2016    Mikey GORDON    COLONOSCOPY N/A 2021    COLONOSCOPY  repeat in 2 years , result polypectomy HOT BIOPSY  Teo Gutierrez MD at [facility] ENDOSCOPY    DENTAL SURGERY      x3 pulled    HYSTERECTOMY (CERVIX STATUS UNKNOWN)      kept ovaries       Family History   Problem Relation Age of Onset    Hypertension Mother     Dementia Mother     Hypertension Father     Heart Disease Father         stroke during heart surgery     High Cholesterol Father     Heart Disease

## 2024-02-18 LAB
MICROORGANISM SPEC CULT: NORMAL
SPECIMEN DESCRIPTION: NORMAL

## 2024-02-19 LAB
C TRACH DNA SPEC QL PROBE+SIG AMP: NEGATIVE
MICROORGANISM SPEC CULT: NORMAL
MICROORGANISM SPEC CULT: NORMAL
N GONORRHOEA DNA SPEC QL PROBE+SIG AMP: NEGATIVE
SPECIMEN DESCRIPTION: NORMAL
SPECIMEN DESCRIPTION: NORMAL

## 2024-02-21 ENCOUNTER — HOSPITAL ENCOUNTER (OUTPATIENT)
Age: 74
Setting detail: SPECIMEN
Discharge: HOME | End: 2024-02-21
Payer: MEDICARE

## 2024-02-21 DIAGNOSIS — E11.9 TYPE 2 DIABETES, HBA1C GOAL < 7% (HCC): ICD-10-CM

## 2024-02-21 LAB
CREAT UR-MCNC: 190 MG/DL (ref 28–217)
MICROALBUMIN UR-MCNC: 41 MG/L
MICROALBUMIN/CREAT UR-RTO: 22 MCG/MG CREAT

## 2024-02-21 PROCEDURE — 82570 ASSAY OF URINE CREATININE: CPT

## 2024-02-21 PROCEDURE — 82043 UR ALBUMIN QUANTITATIVE: CPT

## 2024-02-24 LAB — CYTOLOGY REPORT: NORMAL

## 2024-04-26 DIAGNOSIS — F32.A ANXIETY AND DEPRESSION: ICD-10-CM

## 2024-04-26 DIAGNOSIS — F41.9 ANXIETY AND DEPRESSION: ICD-10-CM

## 2024-04-26 NOTE — TELEPHONE ENCOUNTER
Last OV: 2/16/2024  Last RX: 11/1/2023   Next scheduled apt: 5/17/2024      Surescript requesting refill

## 2024-04-27 RX ORDER — VENLAFAXINE HYDROCHLORIDE 75 MG/1
75 CAPSULE, EXTENDED RELEASE ORAL DAILY
Qty: 90 CAPSULE | Refills: 1 | Status: SHIPPED | OUTPATIENT
Start: 2024-04-27

## 2024-05-22 ENCOUNTER — NURSE ONLY (OUTPATIENT)
Dept: PRIMARY CARE CLINIC | Age: 74
End: 2024-05-22
Payer: MEDICARE

## 2024-05-22 VITALS — WEIGHT: 192.8 LBS | BODY MASS INDEX: 34.15 KG/M2

## 2024-05-22 DIAGNOSIS — Z12.11 COLON CANCER SCREENING: ICD-10-CM

## 2024-05-22 DIAGNOSIS — E08.8 DIABETES MELLITUS DUE TO UNDERLYING CONDITION WITH COMPLICATION, WITHOUT LONG-TERM CURRENT USE OF INSULIN (HCC): ICD-10-CM

## 2024-05-22 DIAGNOSIS — E11.9 TYPE 2 DIABETES, HBA1C GOAL < 7% (HCC): Primary | ICD-10-CM

## 2024-05-22 DIAGNOSIS — Z86.010 HX OF COLONIC POLYPS: ICD-10-CM

## 2024-05-22 DIAGNOSIS — Z12.11 SCREENING FOR COLON CANCER: ICD-10-CM

## 2024-05-22 LAB — HBA1C MFR BLD: 7.8 %

## 2024-05-22 PROCEDURE — 83036 HEMOGLOBIN GLYCOSYLATED A1C: CPT

## 2024-06-06 ENCOUNTER — TELEPHONE (OUTPATIENT)
Dept: FAMILY MEDICINE CLINIC | Age: 74
End: 2024-06-06

## 2024-06-06 NOTE — TELEPHONE ENCOUNTER
SURGERY SCHEDULING FORM   [address] [place], [place]    Phone *[phone]   Surgical Scheduling Direct Line Phone *[phone] Fax *[phone]      Krupa Abrams 1950 female         Home Phone: [phone]      Cell Phone:    Telephone Information:   Mobile [phone]          Surgeon: Chuy De La Vega MD                 Surgery Date: 7/11/24             Time: 730a    Procedure: Colonoscopy    Diagnosis: Screening colonoscopy, HX of colonic polyps    Important Medical History:  In Epic    Special Inst/Equip: Regular    CPT Codes:    08571  Latex Allergy: No     Cardiac Device:  No    Anesthesia:  General          Admission Type:  Same Day                        Admit Prior to Day of Surgery: No              Preadmission Testing:  Phone Call          PAT Date and Time:     Need Preop Cardiac Clearance: No    Does Patient have Cardiologist/physician?     no    : [place]                         Date: 6/6/24    Insurance Company Name: [facility] [employer]

## 2024-06-13 ENCOUNTER — OFFICE VISIT (OUTPATIENT)
Dept: FAMILY MEDICINE CLINIC | Age: 74
End: 2024-06-13
Payer: MEDICARE

## 2024-06-13 ENCOUNTER — HOSPITAL ENCOUNTER (OUTPATIENT)
Age: 74
Discharge: HOME | End: 2024-06-13
Payer: MEDICARE

## 2024-06-13 VITALS — BODY MASS INDEX: 34.54 KG/M2 | HEART RATE: 97 BPM | WEIGHT: 195 LBS | OXYGEN SATURATION: 97 %

## 2024-06-13 DIAGNOSIS — Z01.818 PRE-OP TESTING: ICD-10-CM

## 2024-06-13 DIAGNOSIS — Z86.010 H/O ADENOMATOUS POLYP OF COLON: Primary | ICD-10-CM

## 2024-06-13 PROCEDURE — 1123F ACP DISCUSS/DSCN MKR DOCD: CPT | Performed by: INTERNAL MEDICINE

## 2024-06-13 PROCEDURE — G8427 DOCREV CUR MEDS BY ELIG CLIN: HCPCS | Performed by: INTERNAL MEDICINE

## 2024-06-13 PROCEDURE — G8417 CALC BMI ABV UP PARAM F/U: HCPCS | Performed by: INTERNAL MEDICINE

## 2024-06-13 PROCEDURE — G8399 PT W/DXA RESULTS DOCUMENT: HCPCS | Performed by: INTERNAL MEDICINE

## 2024-06-13 PROCEDURE — 1036F TOBACCO NON-USER: CPT | Performed by: INTERNAL MEDICINE

## 2024-06-13 PROCEDURE — 1090F PRES/ABSN URINE INCON ASSESS: CPT | Performed by: INTERNAL MEDICINE

## 2024-06-13 PROCEDURE — 93005 ELECTROCARDIOGRAM TRACING: CPT

## 2024-06-13 PROCEDURE — 3017F COLORECTAL CA SCREEN DOC REV: CPT | Performed by: INTERNAL MEDICINE

## 2024-06-13 PROCEDURE — 99213 OFFICE O/P EST LOW 20 MIN: CPT | Performed by: INTERNAL MEDICINE

## 2024-06-13 RX ORDER — SODIUM, POTASSIUM,MAG SULFATES 17.5-3.13G
SOLUTION, RECONSTITUTED, ORAL ORAL
Qty: 1 EACH | Refills: 0 | Status: SHIPPED | OUTPATIENT
Start: 2024-06-13

## 2024-06-13 ASSESSMENT — ENCOUNTER SYMPTOMS
CONSTIPATION: 0
ABDOMINAL PAIN: 0
RHINORRHEA: 0
SHORTNESS OF BREATH: 0
SORE THROAT: 0
COUGH: 0
NAUSEA: 0
DIARRHEA: 0
BLOOD IN STOOL: 0
CHEST TIGHTNESS: 0

## 2024-06-13 NOTE — PROGRESS NOTES
Cancer      Relation: Brother          Age of Onset: (Not Specified)          Comment: kidney      Current Outpatient Medications on File Prior to Visit:  venlafaxine (EFFEXOR XR) 75 MG extended release capsule, TAKE 1 CAPSULE BY MOUTH DAILY, Disp: 90 capsule, Rfl: 1  losartan (COZAAR) 100 MG tablet, TAKE 1 TABLET BY MOUTH DAILY FOR HYPERTENSION/KIDNEY PROTECTION WITH DIABETES, Disp: 90 tablet, Rfl: 1  atorvastatin (LIPITOR) 20 MG tablet, TAKE 1 TABLET BY MOUTH DAILY, Disp: 90 tablet, Rfl: 1  amLODIPine (NORVASC) 10 MG tablet, TAKE 1 TABLET BY MOUTH DAILY, Disp: 90 tablet, Rfl: 1  sitaGLIPtan-metFORMIN (JANUMET)  MG per tablet, Take 1 tablet by mouth 2 times daily (with meals), Disp: 180 tablet, Rfl: 3  blood glucose test strips (ASCENSIA AUTODISC VI;ONE TOUCH ULTRA TEST VI) strip, Test sugar Bid and as needed Dx E 11.9, Disp: 100 each, Rfl: 3  Cholecalciferol (VITAMIN D3) 125 MCG (5000 UT) TABS, Take by mouth daily, Disp: , Rfl:   Calcium Carbonate-Vitamin D (CALCIUM PLUS VITAMIN D) 500-50 MG-UNIT CAPS, Take 1 capsule by mouth daily, Disp: 60 capsule, Rfl:   Lancets MISC, Testing Blood sugar once daily and as needed., Disp: 100 each, Rfl: 1  TGT PSYLLIUM FIBER PO, Take 2 capsules by mouth daily, Disp: , Rfl:     No current facility-administered medications on file prior to visit.       -- Penicillins -- Hives   -- Dust Mite Extract -- Other (See Comments)    --  headache      Lab Results       Component                Value               Date                       NA                       138                 02/16/2024                 K                        4.3                 02/16/2024                 CL                       104                 02/16/2024                 CO2                      20                  02/16/2024                 BUN                      14                  02/16/2024                 CREATININE               0.8                 02/16/2024                 GLUCOSE

## 2024-06-13 NOTE — PATIENT INSTRUCTIONS
Plan:  1. H/O adenomatous polyp of colon  Set up for a colonoscopy.  Risk and benefits of the procedure explained including risk for infection, bleeding, perforation, and death.  Verbal and written informed consent obtained.  The bowel prep was explained to Krupa and she was instructed to start the prep the day before the procedure (printed directions were given).  Avoid corn 1 week prior to the procedure as this can cause suctioning difficulties during the procedure.  Avoid eating or drinking at least 8 hours prior to the procedure.  Krupa was encouraged to call the clinic if she has any questions prior to the procedure.   - COLONOSCOPY W/ OR W/O BIOPSY; Future  - sodium-potassium-mag sulfate (SUPREP BOWEL PREP KIT) 17.5-3.13-1.6 GM/177ML SOLN solution; Take as directed.  Dispense: 1 each; Refill: 0    2. Pre-op testing  ECG prior to the procedure.    - EKG 12 lead; Future      Follow up with Krupa Minor CNP, after the procedure.

## 2024-06-14 LAB
EKG ATRIAL RATE: 87 BPM
EKG P AXIS: 62 DEGREES
EKG P-R INTERVAL: 140 MS
EKG Q-T INTERVAL: 370 MS
EKG QRS DURATION: 96 MS
EKG QTC CALCULATION (BAZETT): 445 MS
EKG R AXIS: -52 DEGREES
EKG T AXIS: 68 DEGREES
EKG VENTRICULAR RATE: 87 BPM

## 2024-07-02 ENCOUNTER — PREP FOR PROCEDURE (OUTPATIENT)
Dept: INTERNAL MEDICINE CLINIC | Age: 74
End: 2024-07-02

## 2024-07-02 RX ORDER — SODIUM CHLORIDE, SODIUM LACTATE, POTASSIUM CHLORIDE, CALCIUM CHLORIDE 600; 310; 30; 20 MG/100ML; MG/100ML; MG/100ML; MG/100ML
INJECTION, SOLUTION INTRAVENOUS CONTINUOUS
Status: CANCELLED | OUTPATIENT
Start: 2024-07-02

## 2024-07-02 RX ORDER — SODIUM CHLORIDE 0.9 % (FLUSH) 0.9 %
5-40 SYRINGE (ML) INJECTION PRN
Status: CANCELLED | OUTPATIENT
Start: 2024-07-02

## 2024-07-02 RX ORDER — SODIUM CHLORIDE 9 MG/ML
25 INJECTION, SOLUTION INTRAVENOUS PRN
Status: CANCELLED | OUTPATIENT
Start: 2024-07-02

## 2024-07-02 RX ORDER — SODIUM CHLORIDE 0.9 % (FLUSH) 0.9 %
5-40 SYRINGE (ML) INJECTION EVERY 12 HOURS SCHEDULED
Status: CANCELLED | OUTPATIENT
Start: 2024-07-02

## 2024-07-02 NOTE — PROGRESS NOTES
[facility]   Preadmission Testing    Name: Krupa Abrams  : 1950  Patient Phone: [phone] (home)     Procedure: colonoscopy  Date of Procedure:   Surgeon: Chuy De La Vega MD    Ht:  158.8 cm (5' 2.5\")  Wt: 88.5 kg (195 lb)  Wt method: Stated    Allergies:   Allergies   Allergen Reactions    Penicillins Hives    Dust Mite Extract Other (See Comments)     headache                There were no vitals filed for this visit.    No LMP recorded (lmp unknown). Patient has had a hysterectomy.    Do you take blood thinners?   [] Yes    [x] No         Instructed to stop blood thinners prior to procedure?    [] Yes    [] No      [x] N/A   Do you have sleep apnea?   [] Yes    [x] No     Do you have acid reflux ?   [] Yes    [x] No     Do you have  hiatal hernia?   [] Yes    [x] No    Do you ever experience motion sickness?   [] Yes    [x] No     Have you had a respiratory infection or sore throat in last 4 weeks before surgery?    [] Yes    [x] No     Do you have poorly controlled asthma or COPD?  Difficulty with intubation in past? [] Yes    [x] No      [] Yes    [x] No       Do you have a history of angina in the last month or symptomatic arrhythmia?   [] Yes    [x] No     Do you have significant central nervous system disease?   [] Yes    [x] No     Have you had an EKG, labs, or chest xray in last 12 months?  If yes provide copies to anesthesia   [x] Yes    [] No       [] Lab    [x] EKG    [] CXR     Have you had a stress test?     [] Yes    [x] No    When/where:    Was it normal?    [] Yes    [] No     Do you or your family have a history of Malignant Hyperthermia?   [] Yes    [x] No           Do you smoke? [] Yes    [x] No      Please refrain from smoking on the day of surgery.      Patient instructed on: [x] NPO Status   [x] Meds to Take  [x] Hold GLP-1 Receptor Agonist  [x] Ride Home  [x] No Jewelry/Contact Lenses/Nail Polish  [x] Prep/Lax/Clear Liquids    [] Chlorhexidene     DOS Patient Needs []

## 2024-07-09 ENCOUNTER — ANESTHESIA EVENT (OUTPATIENT)
Dept: ENDOSCOPY | Age: 74
End: 2024-07-09
Payer: MEDICARE

## 2024-07-11 ENCOUNTER — HOSPITAL ENCOUNTER (OUTPATIENT)
Age: 74
Setting detail: OUTPATIENT SURGERY
Discharge: HOME | End: 2024-07-11
Attending: INTERNAL MEDICINE | Admitting: INTERNAL MEDICINE
Payer: MEDICARE

## 2024-07-11 ENCOUNTER — ANESTHESIA (OUTPATIENT)
Dept: ENDOSCOPY | Age: 74
End: 2024-07-11
Payer: MEDICARE

## 2024-07-11 VITALS
OXYGEN SATURATION: 99 % | TEMPERATURE: 97.7 F | BODY MASS INDEX: 34.2 KG/M2 | RESPIRATION RATE: 11 BRPM | WEIGHT: 193 LBS | HEIGHT: 63 IN | HEART RATE: 76 BPM | DIASTOLIC BLOOD PRESSURE: 69 MMHG | SYSTOLIC BLOOD PRESSURE: 165 MMHG

## 2024-07-11 DIAGNOSIS — Z86.010 HISTORY OF COLONIC POLYPS: ICD-10-CM

## 2024-07-11 DIAGNOSIS — Z12.11 COLON CANCER SCREENING: ICD-10-CM

## 2024-07-11 LAB — GLUCOSE BLD-MCNC: 227 MG/DL (ref 65–99)

## 2024-07-11 PROCEDURE — 45380 COLONOSCOPY AND BIOPSY: CPT | Performed by: INTERNAL MEDICINE

## 2024-07-11 PROCEDURE — 7100000011 HC PHASE II RECOVERY - ADDTL 15 MIN: Performed by: INTERNAL MEDICINE

## 2024-07-11 PROCEDURE — 88305 TISSUE EXAM BY PATHOLOGIST: CPT

## 2024-07-11 PROCEDURE — 2500000003 HC RX 250 WO HCPCS: Performed by: NURSE ANESTHETIST, CERTIFIED REGISTERED

## 2024-07-11 PROCEDURE — 2709999900 HC NON-CHARGEABLE SUPPLY: Performed by: INTERNAL MEDICINE

## 2024-07-11 PROCEDURE — 7100000010 HC PHASE II RECOVERY - FIRST 15 MIN: Performed by: INTERNAL MEDICINE

## 2024-07-11 PROCEDURE — 3700000000 HC ANESTHESIA ATTENDED CARE: Performed by: INTERNAL MEDICINE

## 2024-07-11 PROCEDURE — 2580000003 HC RX 258: Performed by: INTERNAL MEDICINE

## 2024-07-11 PROCEDURE — 3609010700 HC COLONOSCOPY POLYPECTOMY REMOVAL SNARE/STOMA: Performed by: INTERNAL MEDICINE

## 2024-07-11 PROCEDURE — 82947 ASSAY GLUCOSE BLOOD QUANT: CPT

## 2024-07-11 PROCEDURE — 3700000001 HC ADD 15 MINUTES (ANESTHESIA): Performed by: INTERNAL MEDICINE

## 2024-07-11 PROCEDURE — 6360000002 HC RX W HCPCS: Performed by: NURSE ANESTHETIST, CERTIFIED REGISTERED

## 2024-07-11 RX ORDER — SODIUM CHLORIDE 9 MG/ML
25 INJECTION, SOLUTION INTRAVENOUS PRN
Status: DISCONTINUED | OUTPATIENT
Start: 2024-07-11 | End: 2024-07-11 | Stop reason: HOSPADM

## 2024-07-11 RX ORDER — PROPOFOL 10 MG/ML
INJECTION, EMULSION INTRAVENOUS PRN
Status: DISCONTINUED | OUTPATIENT
Start: 2024-07-11 | End: 2024-07-11 | Stop reason: SDUPTHER

## 2024-07-11 RX ORDER — LIDOCAINE HYDROCHLORIDE 10 MG/ML
INJECTION, SOLUTION EPIDURAL; INFILTRATION; INTRACAUDAL; PERINEURAL PRN
Status: DISCONTINUED | OUTPATIENT
Start: 2024-07-11 | End: 2024-07-11 | Stop reason: SDUPTHER

## 2024-07-11 RX ORDER — FENTANYL CITRATE 50 UG/ML
INJECTION, SOLUTION INTRAMUSCULAR; INTRAVENOUS PRN
Status: DISCONTINUED | OUTPATIENT
Start: 2024-07-11 | End: 2024-07-11 | Stop reason: SDUPTHER

## 2024-07-11 RX ORDER — SODIUM CHLORIDE 0.9 % (FLUSH) 0.9 %
5-40 SYRINGE (ML) INJECTION PRN
Status: DISCONTINUED | OUTPATIENT
Start: 2024-07-11 | End: 2024-07-11 | Stop reason: HOSPADM

## 2024-07-11 RX ORDER — SODIUM CHLORIDE, SODIUM LACTATE, POTASSIUM CHLORIDE, CALCIUM CHLORIDE 600; 310; 30; 20 MG/100ML; MG/100ML; MG/100ML; MG/100ML
INJECTION, SOLUTION INTRAVENOUS CONTINUOUS
Status: DISCONTINUED | OUTPATIENT
Start: 2024-07-11 | End: 2024-07-11 | Stop reason: HOSPADM

## 2024-07-11 RX ORDER — PROPOFOL 10 MG/ML
INJECTION, EMULSION INTRAVENOUS CONTINUOUS PRN
Status: DISCONTINUED | OUTPATIENT
Start: 2024-07-11 | End: 2024-07-11 | Stop reason: SDUPTHER

## 2024-07-11 RX ORDER — SODIUM CHLORIDE 0.9 % (FLUSH) 0.9 %
5-40 SYRINGE (ML) INJECTION EVERY 12 HOURS SCHEDULED
Status: DISCONTINUED | OUTPATIENT
Start: 2024-07-11 | End: 2024-07-11 | Stop reason: HOSPADM

## 2024-07-11 RX ORDER — MIDAZOLAM HYDROCHLORIDE 1 MG/ML
INJECTION INTRAMUSCULAR; INTRAVENOUS PRN
Status: DISCONTINUED | OUTPATIENT
Start: 2024-07-11 | End: 2024-07-11 | Stop reason: SDUPTHER

## 2024-07-11 RX ADMIN — PROPOFOL 75 MCG/KG/MIN: 10 INJECTION, EMULSION INTRAVENOUS at 07:21

## 2024-07-11 RX ADMIN — FENTANYL CITRATE 50 MCG: 50 INJECTION, SOLUTION INTRAMUSCULAR; INTRAVENOUS at 07:15

## 2024-07-11 RX ADMIN — PROPOFOL 80 MG: 10 INJECTION, EMULSION INTRAVENOUS at 07:21

## 2024-07-11 RX ADMIN — FENTANYL CITRATE 50 MCG: 50 INJECTION, SOLUTION INTRAMUSCULAR; INTRAVENOUS at 07:21

## 2024-07-11 RX ADMIN — LIDOCAINE HYDROCHLORIDE 50 MG: 10 INJECTION, SOLUTION EPIDURAL; INFILTRATION; INTRACAUDAL; PERINEURAL at 07:21

## 2024-07-11 RX ADMIN — SODIUM CHLORIDE, POTASSIUM CHLORIDE, SODIUM LACTATE AND CALCIUM CHLORIDE: 600; 310; 30; 20 INJECTION, SOLUTION INTRAVENOUS at 06:53

## 2024-07-11 RX ADMIN — MIDAZOLAM 2 MG: 1 INJECTION INTRAMUSCULAR; INTRAVENOUS at 07:15

## 2024-07-11 ASSESSMENT — PAIN - FUNCTIONAL ASSESSMENT: PAIN_FUNCTIONAL_ASSESSMENT: NONE - DENIES PAIN

## 2024-07-11 NOTE — ANESTHESIA POSTPROCEDURE EVALUATION
Department of Anesthesiology  Postprocedure Note    Patient: Krupa Abrams  MRN: [id number]  YOB: 1950  Date of evaluation: 7/11/2024    Procedure Summary       Date: 07/11/24 Room / Location: [facility] ENDO A / [facility] ENDOSCOPY    Anesthesia Start: 0717 Anesthesia Stop: 0806    Procedure: COLONOSCOPY POLYPECTOMY REMOVAL (Abdomen) Diagnosis:       Colon cancer screening      History of colonic polyps      (Colon cancer screening [Z12.11])      (History of colonic polyps [Z86.010])    Surgeons: Chuy De La Vega MD Responsible Provider: Martín Jordan APRN - CRNA    Anesthesia Type: MAC ASA Status: 3            Anesthesia Type: No value filed.    Geremias Phase I: Geremias Score: 10    Geremias Phase II:      Anesthesia Post Evaluation    Patient location during evaluation: PACU  Patient participation: complete - patient participated  Level of consciousness: awake and lethargic  Pain score: 0  Airway patency: patent  Nausea & Vomiting: no nausea and no vomiting  Cardiovascular status: hemodynamically stable  Respiratory status: acceptable, room air and spontaneous ventilation  Hydration status: euvolemic  Multimodal analgesia pain management approach  Pain management: adequate and satisfactory to patient    No notable events documented.

## 2024-07-11 NOTE — H&P
Gave report to picu nurse. All questions answered. Transported pt up to picu. IV lines intact. Mom cartside and agreeable to admission.    Take 1 tablet by mouth 2 times daily (with meals), Disp: 180 tablet, Rfl: 3  blood glucose test strips (ASCENSIA AUTODISC VI;ONE TOUCH ULTRA TEST VI) strip, Test sugar Bid and as needed Dx E 11.9, Disp: 100 each, Rfl: 3  Cholecalciferol (VITAMIN D3) 125 MCG (5000 UT) TABS, Take by mouth daily, Disp: , Rfl:   Calcium Carbonate-Vitamin D (CALCIUM PLUS VITAMIN D) 500-50 MG-UNIT CAPS, Take 1 capsule by mouth daily, Disp: 60 capsule, Rfl:   Lancets MISC, Testing Blood sugar once daily and as needed., Disp: 100 each, Rfl: 1  TGT PSYLLIUM FIBER PO, Take 2 capsules by mouth daily, Disp: , Rfl:      No current facility-administered medications on file prior to visit.         -- Penicillins -- Hives   -- Dust Mite Extract -- Other (See Comments)    --  headache        Lab Results       Component                Value               Date                       NA                       138                 02/16/2024                 K                        4.3                 02/16/2024                 CL                       104                 02/16/2024                 CO2                      20                  02/16/2024                 BUN                      14                  02/16/2024                 CREATININE               0.8                 02/16/2024                 GLUCOSE                  149 (H)             02/16/2024                 CALCIUM                  10.1                02/16/2024                 PROT                     7.4                 12/11/2012                 BILITOT                  0.2 (L)             02/16/2024                 ALKPHOS                  109 (H)             02/16/2024                 AST                      22                  02/16/2024                 ALT                      18                  02/16/2024                 LABGLOM                  >60                 02/16/2024                 GFRAA                    >60                 02/02/2022                 GLOB                      NOT REPORTED        02/25/2019               Lab Results       Component                Value               Date                       LABA1C                   7.8 (H)             05/22/2024            Lab Results       Component                Value               Date                       EAG                      137                 02/02/2022               Lab Results       Component                Value               Date                       CHOL                     143                 11/20/2023                 CHOL                     152                 10/14/2022                 CHOL                     137                 02/02/2022            Lab Results       Component                Value               Date                       TRIG                     148                 11/20/2023                 TRIG                     135                 10/14/2022                 TRIG                     114                 02/02/2022            Lab Results       Component                Value               Date                       HDL                      41                  11/20/2023                 HDL                      41                  10/14/2022                 HDL                      39 (L)              02/02/2022            No components found for: \"LDLCHOLESTEROL\", \"LDLCALC\"  Lab Results       Component                Value               Date                       VLDL                     30                  11/20/2023                 VLDL                     NOT REPORTED        02/02/2022                 VLDL                                         07/30/2021                        NOT REPORTED (H)  Lab Results       Component                Value               Date                       CHOLHDLRATIO             3.0                 11/20/2023                 CHOLHDLRATIO             3.7                 10/14/2022                 CHOLHDLRATIO             3.5

## 2024-07-11 NOTE — BRIEF OP NOTE
Brief Postoperative Note      Patient: Krupa Abrams  YOB: 1950  MRN: [id number]    Date of Procedure: 7/11/2024    Pre-Op Diagnosis Codes:     * Colon cancer screening [Z12.11]     * History of colonic polyps [Z86.010]    Post-Op Diagnosis:  Colon cancer screening.     Rectal polyp.     Severe sigmoid diverticulosis.        Procedure(s):  Colonoscopy to the Cecum.  Rectal polypectomy with cold cup biopsy forceps.     Surgeon(s):  Chuy De La Vega MD    Assistant:  Mari Baird CST    Anesthesia: Martín Jordan CRNA.  MAC anesthesia.     Estimated Blood Loss (mL): < 1 ml    Complications: None    Specimens:   ID Type Source Tests Collected by Time Destination   A : Rectal Polyp Tissue Rectum SURGICAL PATHOLOGY Chuy De La Vega MD 7/11/2024 0802        Implants:  * No implants in log *      Drains: * No LDAs found *    Electronically signed by Chuy De La Vega MD on 7/11/2024 at 8:06 AM

## 2024-07-11 NOTE — PROCEDURES
[facility]                [address] [place] [address]                               COLONOSCOPY      PATIENT NAME: DARLYN GREER                 : 1950  MED REC NO: [id number]                          ROOM: [facility]  ACCOUNT NO: [id number]                       ADMIT DATE: 2024  PROVIDER: Chuy De La Vega MD    VIDEO-ASSISTED COLONOSCOPY REPORT    DATE OF PROCEDURE: 2024      PROCEDURES:    1. Video-assisted colonoscopy to the cecum.  2. Rectal polypectomy with cold cup biopsy forceps.    PREOPERATIVE DIAGNOSES:    1. Colon cancer screening.  2. History of adenomatous colon polyps.    POSTOPERATIVE DIAGNOSES:    1. Colon cancer screening.  2. Rectal polyp.  3. Severe sigmoid diverticulosis.    ANESTHESIA:  Per Martín Jordan CRNA, MAC anesthetic.    ASSISTANT:  Mari Baird CST.    COMPLICATIONS:  None.    ESTIMATED BLOOD LOSS:  Less than 1 mL.    PROCEDURE NOTE:  The patient was brought to the operating room, where procedure was explained along with possible complications and informed consent was obtained.  She was then taken to the minor procedure room, where continuous cardiopulmonary monitor was placed along with O2 via simple mask.  The patient was then placed flat in bed, positioned on her left side, made comfortable and IV sedation was given per Martín Jordan CRNA.  Once the patient was adequately sedated, the colonoscope was inserted into the rectum with CO2 insufflation being performed.  Colonoscope was slowly advanced up to the sigmoid colon where several diverticula were noted.  None looked to be actively inflamed or bleeding.  Photodocumentation was taken.  Colonoscope was then advanced to the descending colon past the splenic flexure into the transverse colon.  Upon first pass to the descending and transverse colon, no abnormalities were noted other than having a lot of retained fecal material.  Colonoscope was then advanced past the

## 2024-07-11 NOTE — ANESTHESIA PRE PROCEDURE
Department of Anesthesiology  Preprocedure Note       Name:  Krupa Abrams   Age:  74 y.o.  :  1950                                          MRN:  [id number]         Date:  2024      Surgeon: Surgeon(s):  Chuy De La Vega MD    Procedure: Procedure(s):  COLONOSCOPY    Medications prior to admission:   Prior to Admission medications    Medication Sig Start Date End Date Taking? Authorizing Provider   venlafaxine (EFFEXOR XR) 75 MG extended release capsule TAKE 1 CAPSULE BY MOUTH DAILY 24   Krupa Minor APRN - CNP   losartan (COZAAR) 100 MG tablet TAKE 1 TABLET BY MOUTH DAILY FOR HYPERTENSION/KIDNEY PROTECTION WITH DIABETES 24   Krupa Minor APRN - CNP   atorvastatin (LIPITOR) 20 MG tablet TAKE 1 TABLET BY MOUTH DAILY 24   Krupa Minor APRN - CNP   amLODIPine (NORVASC) 10 MG tablet TAKE 1 TABLET BY MOUTH DAILY 24   Krupa Minor APRN - CNP   sitaGLIPtan-metFORMIN (JANUMET)  MG per tablet Take 1 tablet by mouth 2 times daily (with meals) 23   Krpua Minor APRN - CNP   blood glucose test strips (ASCENSIA AUTODISC VI;ONE TOUCH ULTRA TEST VI) strip Test sugar Bid and as needed Dx E 11.9 22   Krupa Minor APRN - CNP   Cholecalciferol (VITAMIN D3) 125 MCG (5000 UT) TABS Take by mouth daily    ProviderRadha MD   Calcium Carbonate-Vitamin D (CALCIUM PLUS VITAMIN D) 500-50 MG-UNIT CAPS Take 1 capsule by mouth daily 21   Krupa Minor APRN - CNP   Lancets MISC Testing Blood sugar once daily and as needed. 21   Krpua Minor APRN - CNP   TGT PSYLLIUM FIBER PO Take 2 capsules by mouth daily    ProviderRadha MD       Current medications:    Current Facility-Administered Medications   Medication Dose Route Frequency Provider Last Rate Last Admin    lactated ringers IV soln infusion   IntraVENous Continuous Chuy De La Vega MD 75 mL/hr at 24 0653 New Bag at 24 0653    sodium chloride flush 0.9 % injection 5-40 mL  5-40 mL IntraVENous 2

## 2024-07-11 NOTE — DISCHARGE INSTRUCTIONS
Discharge Instructions    Admission Date:  7/11/2024  Discharge Date:  7/11/24    Disposition:  Home    Activity:  As tolerated    Diet:  Diabetic     Discharge Instructions:  Resume previous home medication.     Follow Up:  With your primary care provider (Krupa Minor CNP) in 2 weeks.    Discharge Instructions for Colonoscopy    Do not drive or operate machinery for 24 hours.  Do not make important personal or business decisions for 24 hours.   Do not drink alcoholic beverages for 24 hours.  Do not smoke tobacco products for 24 hours.  It is normal to have a feeling of fullness or mild cramping in your abdomen afterwards due to air that is put into your bowel during the procedure. Mild activities such as walking will help you pass the air.  You may resume your regular diet.   Results from a biopsy may take up to 2 weeks to return from pathology.  Make a follow-up appointment with PCP to be seen in 2 weeks.     SEEK MEDICAL ATTENTION IF:    Chest Pain or trouble breathing.    Persistent and significant bleeding from your rectum, such as soaking through clothing and/or feeling dizzy and sweating.    A fever above 101F or if you have chills.    If symptoms are severe call 911 or go to the nearest Emergency Room.

## 2024-07-12 ENCOUNTER — TELEPHONE (OUTPATIENT)
Dept: FAMILY MEDICINE CLINIC | Age: 74
End: 2024-07-12

## 2024-07-12 LAB — SURGICAL PATHOLOGY REPORT: NORMAL

## 2024-07-12 NOTE — TELEPHONE ENCOUNTER
----- Message from Odalismichael Sullivanshanika Gill sent at 7/12/2024 10:06 AM EDT -----  Regarding: ECC Referral Request  ECC Referral Request    Reason for referral request: Lab/Test Order    Specialist/Lab/Test patient is requesting (if known): Colonoscopy Performed by DR.Billy De La Vega    Specialist Phone Number (if applicable): n/a    Additional Information ; Pt requested a test result for colonoscopy she had yesterday July 11 2024  --------------------------------------------------------------------------------------------------------------------------    Relationship to Patient: Self     Call Back Information: OK to leave message on voicemail  Preferred Call Back Number: Phone number ;  [phone]

## 2024-07-29 ENCOUNTER — OFFICE VISIT (OUTPATIENT)
Dept: FAMILY MEDICINE CLINIC | Age: 74
End: 2024-07-29
Payer: MEDICARE

## 2024-07-29 VITALS
DIASTOLIC BLOOD PRESSURE: 84 MMHG | OXYGEN SATURATION: 100 % | WEIGHT: 195.8 LBS | SYSTOLIC BLOOD PRESSURE: 138 MMHG | HEIGHT: 63 IN | BODY MASS INDEX: 34.69 KG/M2 | HEART RATE: 97 BPM

## 2024-07-29 DIAGNOSIS — Z98.890 S/P COLONOSCOPY WITH POLYPECTOMY: ICD-10-CM

## 2024-07-29 DIAGNOSIS — E55.9 VITAMIN D DEFICIENCY: ICD-10-CM

## 2024-07-29 DIAGNOSIS — Z13.29 THYROID DISORDER SCREENING: ICD-10-CM

## 2024-07-29 DIAGNOSIS — I10 ESSENTIAL HYPERTENSION, BENIGN: Primary | ICD-10-CM

## 2024-07-29 DIAGNOSIS — E78.2 MIXED HYPERLIPIDEMIA: ICD-10-CM

## 2024-07-29 PROCEDURE — 1090F PRES/ABSN URINE INCON ASSESS: CPT | Performed by: NURSE PRACTITIONER

## 2024-07-29 PROCEDURE — 1036F TOBACCO NON-USER: CPT | Performed by: NURSE PRACTITIONER

## 2024-07-29 PROCEDURE — 3075F SYST BP GE 130 - 139MM HG: CPT | Performed by: NURSE PRACTITIONER

## 2024-07-29 PROCEDURE — 3017F COLORECTAL CA SCREEN DOC REV: CPT | Performed by: NURSE PRACTITIONER

## 2024-07-29 PROCEDURE — 1123F ACP DISCUSS/DSCN MKR DOCD: CPT | Performed by: NURSE PRACTITIONER

## 2024-07-29 PROCEDURE — 3079F DIAST BP 80-89 MM HG: CPT | Performed by: NURSE PRACTITIONER

## 2024-07-29 PROCEDURE — G8427 DOCREV CUR MEDS BY ELIG CLIN: HCPCS | Performed by: NURSE PRACTITIONER

## 2024-07-29 PROCEDURE — 99213 OFFICE O/P EST LOW 20 MIN: CPT | Performed by: NURSE PRACTITIONER

## 2024-07-29 PROCEDURE — G8399 PT W/DXA RESULTS DOCUMENT: HCPCS | Performed by: NURSE PRACTITIONER

## 2024-07-29 PROCEDURE — G8417 CALC BMI ABV UP PARAM F/U: HCPCS | Performed by: NURSE PRACTITIONER

## 2024-07-29 NOTE — PROGRESS NOTES
[facility]  [facility]  [address] [address]  Dept: [phone]  Dept Fax: [phone]    Last encounter 2/20/2023    Post colonoscopy (Pt had colonoscopy on 7/11, removed 1 polyp. Pt's bowel habits have returned to her normal)       HPI:   Krupa Abrams is a 74 y.o. female who presentstoday for her medical conditions/complaints as noted below.  Krupa Abrams is c/o of Post colonoscopy (Pt had colonoscopy on 7/11, removed 1 polyp. Pt's bowel habits have returned to her normal)      HPI  Established patient    74 year olf female        S/p colonoscopy/polypectomy doing well, bowel habits back to normal     2 sons , Gustavo and Maxi.   Grandchildren: Melissa visits some.     Pt on effexor 75 mg daily anxiety with being alone a lot.   4 girls get together for out to eat.     Had bad day cried whole day once unsure why better now, enjoys time out with girlfriends     Nap 1 hour per day.   Neighbors dog wakes her at times.   Denies sleep apnea testing.   Denies restless leg.   Has some neuropathy.     Htn well controlled   No Chest pain , dizziness  Treatment Adherence:   Medication compliance:  compliant all of the time  Diet compliance:  compliant all of the time  Weight trend: stable  Current exercise: walks some but balance an issue   Barriers: stress    Diabetes Mellitus Type 2: Current symptoms/problems include none.    Home blood sugar records: fasting range: 110  Any episodes of hypoglycemia? no  Eye exam current (within one year): yes  Tobacco history: She  reports that she has never smoked. She has never used smokeless tobacco.   Daily Aspirin? No:     Hypertension:  Home blood pressure monitoring: No.  She is adherent to a low sodium diet. Patient denies chest pain, shortness of breath, headache, lightheadedness, and blurred vision.  Antihypertensive medication side effects: no medication side effects noted.  Use of agents associated with hypertension: none.

## 2024-07-29 NOTE — PATIENT INSTRUCTIONS
THANK YOU FOR TRUSTING US WITH YOUR HEALTHCARE !!    The associates caring for you today were:    Family Practice Provider: Krupa CORTEZ-CYNDI    Clinical Team Nurse: Elenita Sierra LPN    Clinical Team Medical Assistant: Estella Kirby MA    Our goal is to provide you with EXCEPTIONAL patient care, and we strive to do this for EVERY patient, EVERY visit. Since we care about you and your experience, you may receive a patient satisfaction survey from Gary Mercedes by postal mail/email/text. We truly welcome your feedback and ask that you complete the survey to let us know how we are doing.    Important Numbers:  [facility] phone [phone]   [place] Office Nurse/MA Line: [phone]  Fax  [phone]   Central [phone]  Billing questions: [phone]  Medical Records Request: [phone]    Manage your healthcare  with [facility] My[facility]. To activate your account, visit [email or web address]   DIGITAL scheduling available now through my chart.  Schedule your next appointment at your convenience through your my chart.       [place] Office Hours:  Monday: [place] office location 8-5 ([phone]) Offering additional late hours the first Monday of the month until 7 pm.   Tuesday: 8: :  812 Noon, closed  of the month   : 7:30-4:30   SURVEY:    You may be receiving a survey from Gary Mercedes regarding your visit today.    Please complete the survey to enable us to provide the highest quality of care to you and your family.    If you cannot score us a very good on any question, please call the office to discuss how we could have made your experience a very good one.    Thank you.

## 2024-07-30 ASSESSMENT — ENCOUNTER SYMPTOMS
CHEST TIGHTNESS: 0
COUGH: 0
SORE THROAT: 0
EYES NEGATIVE: 1
SHORTNESS OF BREATH: 0
WHEEZING: 0

## 2024-08-19 ENCOUNTER — TELEPHONE (OUTPATIENT)
Dept: FAMILY MEDICINE CLINIC | Age: 74
End: 2024-08-19

## 2024-08-19 NOTE — TELEPHONE ENCOUNTER
----- Message from Isaias ADRIAN sent at 8/19/2024  2:49 PM EDT -----  Regarding: ECC Message to Provider  ECC Message to Provider    Relationship to Patient: Self     Additional Information : Patient would like to know if she needs to fast or have blood work before her appointment on 8/26/24 .   --------------------------------------------------------------------------------------------------------------------------    Call Back Information: OK to leave message on voicemail  Preferred Call Back Number: Phone [phone]

## 2024-08-26 ENCOUNTER — NURSE ONLY (OUTPATIENT)
Dept: FAMILY MEDICINE CLINIC | Age: 74
End: 2024-08-26
Payer: MEDICARE

## 2024-08-26 DIAGNOSIS — E08.8 DIABETES MELLITUS DUE TO UNDERLYING CONDITION WITH COMPLICATION, WITHOUT LONG-TERM CURRENT USE OF INSULIN (HCC): Primary | ICD-10-CM

## 2024-08-26 LAB — HBA1C MFR BLD: 8.7 %

## 2024-08-26 PROCEDURE — 83036 HEMOGLOBIN GLYCOSYLATED A1C: CPT | Performed by: NURSE PRACTITIONER

## 2024-10-08 DIAGNOSIS — I10 ESSENTIAL HYPERTENSION, BENIGN: ICD-10-CM

## 2024-10-08 DIAGNOSIS — E78.00 PURE HYPERCHOLESTEROLEMIA: ICD-10-CM

## 2024-10-08 RX ORDER — LOSARTAN POTASSIUM 100 MG/1
TABLET ORAL
Qty: 90 TABLET | Refills: 1 | Status: SHIPPED | OUTPATIENT
Start: 2024-10-08

## 2024-10-08 RX ORDER — AMLODIPINE BESYLATE 10 MG/1
10 TABLET ORAL DAILY
Qty: 90 TABLET | Refills: 1 | Status: SHIPPED | OUTPATIENT
Start: 2024-10-08

## 2024-10-08 RX ORDER — ATORVASTATIN CALCIUM 20 MG/1
20 TABLET, FILM COATED ORAL DAILY
Qty: 90 TABLET | Refills: 1 | Status: SHIPPED | OUTPATIENT
Start: 2024-10-08

## 2024-10-08 NOTE — TELEPHONE ENCOUNTER
Last OV: 7/29/2024  Last RX: 1/25/2024   Next scheduled apt: 11/11/2024    Surescripts requesting refill

## 2024-11-08 ENCOUNTER — HOSPITAL ENCOUNTER (OUTPATIENT)
Age: 74
Discharge: HOME | End: 2024-11-08
Payer: MEDICARE

## 2024-11-08 DIAGNOSIS — I10 ESSENTIAL HYPERTENSION, BENIGN: ICD-10-CM

## 2024-11-08 DIAGNOSIS — E78.2 MIXED HYPERLIPIDEMIA: ICD-10-CM

## 2024-11-08 DIAGNOSIS — Z13.29 THYROID DISORDER SCREENING: ICD-10-CM

## 2024-11-08 DIAGNOSIS — E55.9 VITAMIN D DEFICIENCY: ICD-10-CM

## 2024-11-08 LAB
25(OH)D3 SERPL-MCNC: 45.6 NG/ML (ref 30–100)
ALBUMIN SERPL-MCNC: 3.8 G/DL (ref 3.5–5.2)
ALP SERPL-CCNC: 127 U/L (ref 35–104)
ALT SERPL-CCNC: 6 U/L (ref 5–33)
ANION GAP SERPL CALCULATED.3IONS-SCNC: 13 MMOL/L (ref 9–17)
AST SERPL-CCNC: 13 U/L
BASOPHILS # BLD: 0.05 K/UL (ref 0–0.2)
BASOPHILS NFR BLD: 0 % (ref 0–2)
BILIRUB SERPL-MCNC: 0.4 MG/DL (ref 0.3–1.2)
BUN SERPL-MCNC: 14 MG/DL (ref 8–23)
BUN/CREAT SERPL: 20 (ref 9–20)
CALCIUM SERPL-MCNC: 9.7 MG/DL (ref 8.6–10.4)
CHLORIDE SERPL-SCNC: 102 MMOL/L (ref 98–107)
CHOLEST SERPL-MCNC: 196 MG/DL (ref 0–199)
CHOLESTEROL/HDL RATIO: 5
CO2 SERPL-SCNC: 22 MMOL/L (ref 20–31)
CREAT SERPL-MCNC: 0.7 MG/DL (ref 0.5–0.9)
EOSINOPHIL # BLD: 0.6 K/UL (ref 0–0.4)
EOSINOPHILS RELATIVE PERCENT: 5 % (ref 0–5)
ERYTHROCYTE [DISTWIDTH] IN BLOOD BY AUTOMATED COUNT: 14.1 % (ref 12.1–15.2)
GFR, ESTIMATED: >90 ML/MIN/1.73M2
GLUCOSE SERPL-MCNC: 214 MG/DL (ref 70–99)
HCT VFR BLD AUTO: 39 % (ref 36–46)
HDLC SERPL-MCNC: 39 MG/DL
HGB BLD-MCNC: 13 G/DL (ref 12–16)
IMM GRANULOCYTES # BLD AUTO: 0.03 K/UL (ref 0–0.3)
IMM GRANULOCYTES NFR BLD: 0 % (ref 0–5)
LDLC SERPL CALC-MCNC: 123 MG/DL (ref 0–100)
LYMPHOCYTES NFR BLD: 2.57 K/UL (ref 1–4.8)
LYMPHOCYTES RELATIVE PERCENT: 20 % (ref 15–40)
MCH RBC QN AUTO: 24.8 PG (ref 26–34)
MCHC RBC AUTO-ENTMCNC: 33.3 G/DL (ref 31–37)
MCV RBC AUTO: 74.4 FL (ref 80–100)
MONOCYTES NFR BLD: 0.62 K/UL (ref 0–1)
MONOCYTES NFR BLD: 5 % (ref 4–8)
MORPHOLOGY: ABNORMAL
NEUTROPHILS NFR BLD: 71 % (ref 47–75)
NEUTS SEG NFR BLD: 9.26 K/UL (ref 2.5–7)
PLATELET # BLD AUTO: 403 K/UL (ref 140–450)
PMV BLD AUTO: 10.2 FL (ref 6–12)
POTASSIUM SERPL-SCNC: 3.9 MMOL/L (ref 3.7–5.3)
PROT SERPL-MCNC: 7.5 G/DL (ref 6.4–8.3)
RBC # BLD AUTO: 5.24 M/UL (ref 4–5.2)
SODIUM SERPL-SCNC: 137 MMOL/L (ref 135–144)
TRIGL SERPL-MCNC: 169 MG/DL
TSH SERPL DL<=0.05 MIU/L-ACNC: 2.59 UIU/ML (ref 0.3–5)
VLDLC SERPL CALC-MCNC: 34 MG/DL (ref 1–30)
WBC OTHER # BLD: 13.1 K/UL (ref 3.5–11)

## 2024-11-08 PROCEDURE — 36415 COLL VENOUS BLD VENIPUNCTURE: CPT

## 2024-11-08 PROCEDURE — 82306 VITAMIN D 25 HYDROXY: CPT

## 2024-11-08 PROCEDURE — 80061 LIPID PANEL: CPT

## 2024-11-08 PROCEDURE — 85025 COMPLETE CBC W/AUTO DIFF WBC: CPT

## 2024-11-08 PROCEDURE — 84443 ASSAY THYROID STIM HORMONE: CPT

## 2024-11-08 PROCEDURE — 80053 COMPREHEN METABOLIC PANEL: CPT

## 2024-11-11 ENCOUNTER — OFFICE VISIT (OUTPATIENT)
Dept: FAMILY MEDICINE CLINIC | Age: 74
End: 2024-11-11

## 2024-11-11 VITALS
HEART RATE: 100 BPM | DIASTOLIC BLOOD PRESSURE: 62 MMHG | BODY MASS INDEX: 35.33 KG/M2 | SYSTOLIC BLOOD PRESSURE: 124 MMHG | WEIGHT: 192 LBS | OXYGEN SATURATION: 93 % | HEIGHT: 62 IN

## 2024-11-11 DIAGNOSIS — Z00.00 MEDICARE ANNUAL WELLNESS VISIT, SUBSEQUENT: Primary | ICD-10-CM

## 2024-11-11 DIAGNOSIS — M85.851 OSTEOPENIA OF BOTH HIPS: ICD-10-CM

## 2024-11-11 DIAGNOSIS — M85.852 OSTEOPENIA OF BOTH HIPS: ICD-10-CM

## 2024-11-11 DIAGNOSIS — E08.8 DIABETES MELLITUS DUE TO UNDERLYING CONDITION WITH COMPLICATION, WITHOUT LONG-TERM CURRENT USE OF INSULIN (HCC): ICD-10-CM

## 2024-11-11 DIAGNOSIS — F41.9 ANXIETY AND DEPRESSION: ICD-10-CM

## 2024-11-11 DIAGNOSIS — I10 ESSENTIAL HYPERTENSION, BENIGN: ICD-10-CM

## 2024-11-11 DIAGNOSIS — F32.A ANXIETY AND DEPRESSION: ICD-10-CM

## 2024-11-11 DIAGNOSIS — E78.2 MIXED HYPERLIPIDEMIA: ICD-10-CM

## 2024-11-11 RX ORDER — VENLAFAXINE HYDROCHLORIDE 75 MG/1
75 CAPSULE, EXTENDED RELEASE ORAL DAILY
Qty: 90 CAPSULE | Refills: 1 | Status: SHIPPED | OUTPATIENT
Start: 2024-11-11

## 2024-11-11 SDOH — ECONOMIC STABILITY: FOOD INSECURITY: WITHIN THE PAST 12 MONTHS, THE FOOD YOU BOUGHT JUST DIDN'T LAST AND YOU DIDN'T HAVE MONEY TO GET MORE.: NEVER TRUE

## 2024-11-11 SDOH — ECONOMIC STABILITY: INCOME INSECURITY: HOW HARD IS IT FOR YOU TO PAY FOR THE VERY BASICS LIKE FOOD, HOUSING, MEDICAL CARE, AND HEATING?: NOT HARD AT ALL

## 2024-11-11 SDOH — ECONOMIC STABILITY: FOOD INSECURITY: WITHIN THE PAST 12 MONTHS, YOU WORRIED THAT YOUR FOOD WOULD RUN OUT BEFORE YOU GOT MONEY TO BUY MORE.: NEVER TRUE

## 2024-11-11 ASSESSMENT — PATIENT HEALTH QUESTIONNAIRE - PHQ9
SUM OF ALL RESPONSES TO PHQ QUESTIONS 1-9: 0
6. FEELING BAD ABOUT YOURSELF - OR THAT YOU ARE A FAILURE OR HAVE LET YOURSELF OR YOUR FAMILY DOWN: NOT AT ALL
2. FEELING DOWN, DEPRESSED OR HOPELESS: NOT AT ALL
7. TROUBLE CONCENTRATING ON THINGS, SUCH AS READING THE NEWSPAPER OR WATCHING TELEVISION: NOT AT ALL
SUM OF ALL RESPONSES TO PHQ QUESTIONS 1-9: 0
SUM OF ALL RESPONSES TO PHQ QUESTIONS 1-9: 0
8. MOVING OR SPEAKING SO SLOWLY THAT OTHER PEOPLE COULD HAVE NOTICED. OR THE OPPOSITE, BEING SO FIGETY OR RESTLESS THAT YOU HAVE BEEN MOVING AROUND A LOT MORE THAN USUAL: NOT AT ALL
1. LITTLE INTEREST OR PLEASURE IN DOING THINGS: NOT AT ALL
SUM OF ALL RESPONSES TO PHQ QUESTIONS 1-9: 0
4. FEELING TIRED OR HAVING LITTLE ENERGY: NOT AT ALL
3. TROUBLE FALLING OR STAYING ASLEEP: NOT AT ALL
5. POOR APPETITE OR OVEREATING: NOT AT ALL
9. THOUGHTS THAT YOU WOULD BE BETTER OFF DEAD, OR OF HURTING YOURSELF: NOT AT ALL
10. IF YOU CHECKED OFF ANY PROBLEMS, HOW DIFFICULT HAVE THESE PROBLEMS MADE IT FOR YOU TO DO YOUR WORK, TAKE CARE OF THINGS AT HOME, OR GET ALONG WITH OTHER PEOPLE: NOT DIFFICULT AT ALL
SUM OF ALL RESPONSES TO PHQ9 QUESTIONS 1 & 2: 0

## 2024-11-11 ASSESSMENT — LIFESTYLE VARIABLES
HOW MANY STANDARD DRINKS CONTAINING ALCOHOL DO YOU HAVE ON A TYPICAL DAY: PATIENT DOES NOT DRINK
HOW OFTEN DO YOU HAVE A DRINK CONTAINING ALCOHOL: NEVER

## 2024-11-11 NOTE — PROGRESS NOTES
Medicare Annual Wellness Visit    Krupa Abrams is here for Medicare AWV (HTN, DM, Hypercholesterolemia) and Discuss Labs (Labs done 11/8)    Assessment & Plan   Medicare annual wellness visit, subsequent  Anxiety and depression  -     venlafaxine (EFFEXOR XR) 75 MG extended release capsule; Take 1 capsule by mouth daily, Disp-90 capsule, R-1We are requesting a refill now to prepare for the patient's [employer] and it will go on file until they are due. Thank you!Normal  Diabetes mellitus due to underlying condition with complication, without long-term current use of insulin ([facility])  -      DIABETES FOOT EXAM  Mixed hyperlipidemia  Essential hypertension, benign  Osteopenia of both hips    Recommendations for Preventive Services Due: see orders and patient instructions/AVS.  Recommended screening schedule for the next 5-10 years is provided to the patient in written form: see Patient Instructions/AVS.     Return in 6 months (on 5/11/2025).     Subjective   The following acute and/or chronic problems were also addressed today:    Indigent med form       Patient's complete Health Risk Assessment and screening values have been reviewed and are found in Flowsheets. The following problems were reviewed today and where indicated follow up appointments were made and/or referrals ordered.    Positive Risk Factor Screenings with Interventions:    Fall Risk:  Do you feel unsteady or are you worried about falling? : (!) yes  2 or more falls in past year?: no  Fall with injury in past year?: no     Interventions:  left knee bone on bone and awaiting knee replacement. \"Somedays its perfect others it hurts.\" Uses patches and salon pas.   Reviewed medications, home hazards, visual acuity, and co-morbidities that can increase risk for falls             Inactivity:  On average, how many days per week do you engage in moderate to strenuous exercise (like a brisk walk)?: 0 days (!) Abnormal  On average, how many minutes do you engage in

## 2024-11-11 NOTE — PATIENT INSTRUCTIONS
Practice fall safety and prevention.   Wear low-heeled shoes that fit well and give your feet good support. Talk to your doctor if you have foot problems that make this hard.  Carry a cellphone or wear a medical alert device that you can use to call for help.  Use stepladders instead of chairs to reach high objects. Don't climb if you're at risk for falls. Ask for help, if needed.  Wear the correct eyeglasses, if you need them.        Make your home safer.   Remove rugs, cords, clutter, and furniture from walkways.  Keep your house well lit. Use night-lights in hallways and bathrooms.  Install and use sturdy handrails on stairways.  Wear nonskid footwear, even inside. Don't walk barefoot or in socks without shoes.        Be safe outside.   Use handrails, curb cuts, and ramps whenever possible.  Keep your hands free by using a shoulder bag or backpack.  Try to walk in well-lit areas. Watch out for uneven ground, changes in pavement, and debris.  Be careful in the winter. Walk on the grass or gravel when sidewalks are slippery. Use de-icer on steps and walkways. Add non-slip devices to shoes.    Put grab bars and nonskid mats in your shower or tub and near the toilet. Try to use a shower chair or bath bench when bathing.   Get into a tub or shower by putting in your weaker leg first. Get out with your strong side first. Have a phone or medical alert device in the bathroom with you.   Where can you learn more?  Go to https://www.Yappsa App Store.net/patientEd and enter G117 to learn more about \"Preventing Falls: Care Instructions.\"  Current as of: July 17, 2023  Content Version: 14.2  © 2024 [email or web address].   Care instructions adapted under license by [employer]. If you have questions about a medical condition or this instruction, always ask your healthcare professional. [employer] disclaims any warranty or liability for your use of this information.           Learning About Emotional Support  When

## 2024-11-15 ENCOUNTER — TELEPHONE (OUTPATIENT)
Dept: FAMILY MEDICINE CLINIC | Age: 74
End: 2024-11-15

## 2024-11-15 NOTE — TELEPHONE ENCOUNTER
----- Message from Dasha ALMEIDA sent at 11/15/2024 11:11 AM EST -----  Regarding: ECC Message to Provider  ECC Message to Provider    Relationship to Patient: Self       Additional Information   Patient wants to speak with her primary care provider's nurse named Elenita, because she has some papers that she wants to discuss with the nurse.    PCP:  Krupa Minor APRN - CYNDI    --------------------------------------------------------------------------------------------------------------------------    Call Back Information: OK to leave message on voicemail  Preferred Call Back Number: Phone [phone]

## 2024-12-02 ENCOUNTER — NURSE ONLY (OUTPATIENT)
Dept: FAMILY MEDICINE CLINIC | Age: 74
End: 2024-12-02
Payer: MEDICARE

## 2024-12-02 VITALS — WEIGHT: 190 LBS | BODY MASS INDEX: 34.75 KG/M2

## 2024-12-02 DIAGNOSIS — Z12.31 SCREENING MAMMOGRAM, ENCOUNTER FOR: ICD-10-CM

## 2024-12-02 DIAGNOSIS — E11.9 TYPE 2 DIABETES, HBA1C GOAL < 7% (HCC): Primary | ICD-10-CM

## 2024-12-02 LAB — HBA1C MFR BLD: 7.6 %

## 2024-12-02 PROCEDURE — 83036 HEMOGLOBIN GLYCOSYLATED A1C: CPT | Performed by: NURSE PRACTITIONER

## 2024-12-16 ENCOUNTER — HOSPITAL ENCOUNTER (OUTPATIENT)
Dept: MAMMOGRAPHY | Age: 74
Discharge: HOME | End: 2024-12-18
Payer: MEDICARE

## 2024-12-16 DIAGNOSIS — Z12.31 SCREENING MAMMOGRAM, ENCOUNTER FOR: ICD-10-CM

## 2024-12-16 PROCEDURE — 77063 BREAST TOMOSYNTHESIS BI: CPT

## 2024-12-18 DIAGNOSIS — E08.8 DIABETES MELLITUS DUE TO UNDERLYING CONDITION WITH COMPLICATION, WITHOUT LONG-TERM CURRENT USE OF INSULIN (HCC): ICD-10-CM

## 2024-12-18 NOTE — TELEPHONE ENCOUNTER
Last OV: 11/11/24  Next OV: 5/12/25      Pt has been out of Janumet x 1 week, and has been taking metformin BID.     Spoke with [employer], they'll be mailing pt an attestation form due to pt not having Medicare part D for meds.     Pt informed to be looking for that, will take 7-10 days. Once received pt is to bring it in the office for us to fax. She voiced understanding

## 2024-12-28 ENCOUNTER — PATIENT MESSAGE (OUTPATIENT)
Dept: FAMILY MEDICINE CLINIC | Age: 74
End: 2024-12-28

## 2025-03-25 ENCOUNTER — OFFICE VISIT (OUTPATIENT)
Dept: PRIMARY CARE CLINIC | Age: 75
End: 2025-03-25
Payer: MEDICARE

## 2025-03-25 ENCOUNTER — HOSPITAL ENCOUNTER (OUTPATIENT)
Age: 75
Setting detail: SPECIMEN
Discharge: HOME | End: 2025-03-25
Payer: MEDICARE

## 2025-03-25 VITALS
HEIGHT: 62 IN | HEART RATE: 90 BPM | RESPIRATION RATE: 20 BRPM | OXYGEN SATURATION: 100 % | BODY MASS INDEX: 34.78 KG/M2 | DIASTOLIC BLOOD PRESSURE: 80 MMHG | SYSTOLIC BLOOD PRESSURE: 128 MMHG | WEIGHT: 189 LBS

## 2025-03-25 DIAGNOSIS — E11.65 HYPERGLYCEMIA DUE TO DIABETES MELLITUS (HCC): Primary | ICD-10-CM

## 2025-03-25 DIAGNOSIS — R30.0 DYSURIA: ICD-10-CM

## 2025-03-25 DIAGNOSIS — R11.0 NAUSEA: ICD-10-CM

## 2025-03-25 DIAGNOSIS — E11.9 TYPE 2 DIABETES, HBA1C GOAL < 7% (HCC): ICD-10-CM

## 2025-03-25 DIAGNOSIS — N30.00 ACUTE CYSTITIS WITHOUT HEMATURIA: ICD-10-CM

## 2025-03-25 LAB
BILIRUBIN, POC: NEGATIVE
BLOOD URINE, POC: NEGATIVE
CLARITY, POC: NORMAL
COLOR, POC: YELLOW
GLUCOSE URINE, POC: 100 MG/DL
HBA1C MFR BLD: 9.2 %
KETONES, POC: NEGATIVE MG/DL
LEUKOCYTE EST, POC: NORMAL
NITRITE, POC: NEGATIVE
PH, POC: 5.5
PROTEIN, POC: 30 MG/DL
SPECIFIC GRAVITY, POC: >=1.03
UROBILINOGEN, POC: 0.2 MG/DL

## 2025-03-25 PROCEDURE — 3046F HEMOGLOBIN A1C LEVEL >9.0%: CPT | Performed by: NURSE PRACTITIONER

## 2025-03-25 PROCEDURE — 87086 URINE CULTURE/COLONY COUNT: CPT

## 2025-03-25 PROCEDURE — 1159F MED LIST DOCD IN RCRD: CPT | Performed by: NURSE PRACTITIONER

## 2025-03-25 PROCEDURE — 99213 OFFICE O/P EST LOW 20 MIN: CPT | Performed by: NURSE PRACTITIONER

## 2025-03-25 PROCEDURE — 1123F ACP DISCUSS/DSCN MKR DOCD: CPT | Performed by: NURSE PRACTITIONER

## 2025-03-25 PROCEDURE — 3079F DIAST BP 80-89 MM HG: CPT | Performed by: NURSE PRACTITIONER

## 2025-03-25 PROCEDURE — 3074F SYST BP LT 130 MM HG: CPT | Performed by: NURSE PRACTITIONER

## 2025-03-25 PROCEDURE — 82570 ASSAY OF URINE CREATININE: CPT

## 2025-03-25 PROCEDURE — 82043 UR ALBUMIN QUANTITATIVE: CPT

## 2025-03-25 PROCEDURE — 83036 HEMOGLOBIN GLYCOSYLATED A1C: CPT | Performed by: NURSE PRACTITIONER

## 2025-03-25 PROCEDURE — 1160F RVW MEDS BY RX/DR IN RCRD: CPT | Performed by: NURSE PRACTITIONER

## 2025-03-25 PROCEDURE — 81002 URINALYSIS NONAUTO W/O SCOPE: CPT | Performed by: NURSE PRACTITIONER

## 2025-03-25 RX ORDER — INSULIN GLARGINE 100 [IU]/ML
6 INJECTION, SOLUTION SUBCUTANEOUS NIGHTLY
Qty: 3 EACH | Refills: 0 | Status: SHIPPED | OUTPATIENT
Start: 2025-03-25

## 2025-03-25 RX ORDER — NITROFURANTOIN 25; 75 MG/1; MG/1
100 CAPSULE ORAL 2 TIMES DAILY
Qty: 20 CAPSULE | Refills: 0 | Status: SHIPPED | OUTPATIENT
Start: 2025-03-25 | End: 2025-04-04

## 2025-03-25 SDOH — ECONOMIC STABILITY: FOOD INSECURITY: WITHIN THE PAST 12 MONTHS, THE FOOD YOU BOUGHT JUST DIDN'T LAST AND YOU DIDN'T HAVE MONEY TO GET MORE.: NEVER TRUE

## 2025-03-25 SDOH — ECONOMIC STABILITY: FOOD INSECURITY: WITHIN THE PAST 12 MONTHS, YOU WORRIED THAT YOUR FOOD WOULD RUN OUT BEFORE YOU GOT MONEY TO BUY MORE.: NEVER TRUE

## 2025-03-25 ASSESSMENT — PATIENT HEALTH QUESTIONNAIRE - PHQ9
2. FEELING DOWN, DEPRESSED OR HOPELESS: NOT AT ALL
4. FEELING TIRED OR HAVING LITTLE ENERGY: NOT AT ALL
SUM OF ALL RESPONSES TO PHQ QUESTIONS 1-9: 0
1. LITTLE INTEREST OR PLEASURE IN DOING THINGS: NOT AT ALL
SUM OF ALL RESPONSES TO PHQ QUESTIONS 1-9: 0
SUM OF ALL RESPONSES TO PHQ QUESTIONS 1-9: 0
8. MOVING OR SPEAKING SO SLOWLY THAT OTHER PEOPLE COULD HAVE NOTICED. OR THE OPPOSITE, BEING SO FIGETY OR RESTLESS THAT YOU HAVE BEEN MOVING AROUND A LOT MORE THAN USUAL: NOT AT ALL
6. FEELING BAD ABOUT YOURSELF - OR THAT YOU ARE A FAILURE OR HAVE LET YOURSELF OR YOUR FAMILY DOWN: NOT AT ALL
SUM OF ALL RESPONSES TO PHQ QUESTIONS 1-9: 0
10. IF YOU CHECKED OFF ANY PROBLEMS, HOW DIFFICULT HAVE THESE PROBLEMS MADE IT FOR YOU TO DO YOUR WORK, TAKE CARE OF THINGS AT HOME, OR GET ALONG WITH OTHER PEOPLE: NOT DIFFICULT AT ALL
7. TROUBLE CONCENTRATING ON THINGS, SUCH AS READING THE NEWSPAPER OR WATCHING TELEVISION: NOT AT ALL
5. POOR APPETITE OR OVEREATING: NOT AT ALL
3. TROUBLE FALLING OR STAYING ASLEEP: NOT AT ALL
9. THOUGHTS THAT YOU WOULD BE BETTER OFF DEAD, OR OF HURTING YOURSELF: NOT AT ALL

## 2025-03-25 NOTE — PROGRESS NOTES
Start of insulin , glyco 9.2    Insulin Pen Needle 32G X 4 MM MISC     Si each by Does not apply route daily     Dispense:  100 each     Refill:  3    nitrofurantoin, macrocrystal-monohydrate, (MACROBID) 100 MG capsule     Sig: Take 1 capsule by mouth 2 times daily for 10 days For UTI     Dispense:  20 capsule     Refill:  0     Orders Placed This Encounter   Procedures    Culture, Urine     Standing Status:   Future     Number of Occurrences:   1     Expected Date:   3/25/2025     Expiration Date:   3/25/2026    Albumin/Creatinine Ratio, Urine     Standing Status:   Future     Number of Occurrences:   1     Expected Date:   3/25/2025     Expiration Date:   3/25/2026    [facility] [place]     Referral Priority:   Routine     Referral Type:   Eval and Treat     Referral Reason:   Specialty Services Required     Number of Visits Requested:   1    POCT glycosylated hemoglobin (Hb A1C)    POCT Urinalysis no Micro         Patient Instructions   THANK YOU FOR TRUSTING US WITH YOUR HEALTHCARE !!    The associates caring for you today were:    Family Practice Provider: Krupa CORTEZ-CYNDI    Clinical Team Nurse: Elenita Sierra LPN    Clinical Team Medical Assistant: Estella Kirby MA    Our goal is to provide you with EXCEPTIONAL patient care, and we strive to do this for EVERY patient, EVERY visit. Since we care about you and your experience, you may receive a patient satisfaction survey from Gary Mercedes by postal mail/email/text. We truly welcome your feedback and ask that you complete the survey to let us know how we are doing.    Important Numbers:  [facility] phone [phone]   [place] Office Nurse/MA Line: [phone]  Fax  [phone]   Central [phone]  Billing questions: [phone]  Medical Records Request: [phone]    Manage your healthcare  with [facility]. To activate your account, visit

## 2025-03-25 NOTE — PATIENT INSTRUCTIONS
THANK YOU FOR TRUSTING US WITH YOUR HEALTHCARE !!    The associates caring for you today were:    Family Practice Provider: Krupa CORTEZ-CYNDI    Clinical Team Nurse: Elenita Sierra LPN    Clinical Team Medical Assistant: Estella Kirby MA    Our goal is to provide you with EXCEPTIONAL patient care, and we strive to do this for EVERY patient, EVERY visit. Since we care about you and your experience, you may receive a patient satisfaction survey from Gary Mercedes by postal mail/email/text. We truly welcome your feedback and ask that you complete the survey to let us know how we are doing.    Important Numbers:  [facility] phone [phone]   [place] Office Nurse/MA Line: [phone]  Fax  [phone]   Central [phone]  Billing questions: [phone]  Medical Records Request: [phone]    Manage your healthcare  with [facility]. To activate your account, visit [email or web address]   DIGITAL scheduling available now through my chart.  Schedule your next appointment at your convenience through your my chart.       [place] Office Hours:  Monday: [place] office location 8-5 ([phone]) Offering additional late hours the first Monday of the month until 7 pm.   Tuesday: 8: :  812 Noon, closed  of the month   : 7:30-4:30   SURVEY:    You may be receiving a survey from Gayr Mercedes regarding your visit today.    Please complete the survey to enable us to provide the highest quality of care to you and your family.    If you cannot score us a very good on any question, please call the office to discuss how we could have made your experience a very good one.    Thank you.

## 2025-03-26 ENCOUNTER — RESULTS FOLLOW-UP (OUTPATIENT)
Dept: PRIMARY CARE CLINIC | Age: 75
End: 2025-03-26

## 2025-03-26 LAB
CREAT UR-MCNC: 207 MG/DL (ref 28–217)
MICROALBUMIN UR-MCNC: 46 MG/L (ref 0–20)
MICROALBUMIN/CREAT UR-RTO: 22 MCG/MG CREAT (ref 0–25)

## 2025-03-27 LAB
MICROORGANISM SPEC CULT: NORMAL
SERVICE CMNT-IMP: NORMAL
SPECIMEN DESCRIPTION: NORMAL

## 2025-03-31 ASSESSMENT — ENCOUNTER SYMPTOMS
SORE THROAT: 0
SHORTNESS OF BREATH: 0
COUGH: 0
WHEEZING: 0
CHEST TIGHTNESS: 0
ABDOMINAL DISTENTION: 0
EYES NEGATIVE: 1

## 2025-04-03 ENCOUNTER — HOSPITAL ENCOUNTER (OUTPATIENT)
Dept: DIABETES SERVICES | Age: 75
Setting detail: THERAPIES SERIES
Discharge: HOME | End: 2025-04-03
Payer: MEDICARE

## 2025-04-03 VITALS — WEIGHT: 185 LBS | HEIGHT: 62 IN | BODY MASS INDEX: 34.04 KG/M2

## 2025-04-03 PROCEDURE — G0108 DIAB MANAGE TRN  PER INDIV: HCPCS

## 2025-04-03 NOTE — PROGRESS NOTES
Krupa here for insulin instruction and diabetes education refresher. She was diagnosed with type 2 DM more than 10 years ago. She has had diabetes education in the past. Her A1C was 9.2% on 3-25-25. States she knows she ate too much of the wrong things over the holidays because she just got tired of it (managing diabetes). Discussed diabetes burnout and how to best manage it. She has been taking Janument  BID with meals and will be starting Lantus insulin 6 units nightly. States she would prefer to take this at noon everyday as she gets tired at night and is afraid she will fall asleep before taking it or forget to take it. Instructed on use of insulin pen, priming the pen, rotation of injection sites and disposing of needles safely. Instructed on proper storage of insulin pens. Educated on symptoms and treatment of hypoglycemia. She practices injecting using practice pen and skin. She states she is nervous about injecting herself, but \"will do it.\" She injects 6 units of Lantus insulin to left abdomen. Tolerates well. Discussed what diabetes is and how it is treated. Discussed carb counting and she wonders what she is \"allowed\" to eat. Declines visit with dietitian at this time as she has met with him in the past. Discussed recommendations of 45-60 grams of carbohydrate for each of 3 meals each day and the importance of having a bedtime snack. Daily Meal Planning Guide handout given and explained, and Snack Ideas handout given. Discussed benefits of being active, exercise guidelines and blood sugar parameters to safely begin exercise. States she has \"bone on bone\" to her knees and it becomes painful to walk for long periods of time. Suggested walking in her neighborhood for 10 minutes 3 times a day and she feels she can do that. Discussed long term complications of diabetes and the importance of keeping her A1C below 7% to help reduce the risk. States she does have neuropathy in one of her feet. Invited to

## 2025-04-03 NOTE — TELEPHONE ENCOUNTER
Last OV: 3/25/2025  Last RX: 11/7/2022   Next scheduled apt: 4/28/2025    Pt requesting refill.      Krupa was also asking if she could take her insulin at noon instead of at night. She is afraid that she will forget. Please advise.

## 2025-04-04 NOTE — TELEPHONE ENCOUNTER
Lantus can be taken at noon. , best if same time every day so whatever time works best is great, just keep it consistent    Thanks

## 2025-04-06 DIAGNOSIS — I10 ESSENTIAL HYPERTENSION, BENIGN: ICD-10-CM

## 2025-04-06 DIAGNOSIS — E78.00 PURE HYPERCHOLESTEROLEMIA: ICD-10-CM

## 2025-04-07 RX ORDER — AMLODIPINE BESYLATE 10 MG/1
10 TABLET ORAL DAILY
Qty: 90 TABLET | Refills: 1 | Status: SHIPPED | OUTPATIENT
Start: 2025-04-07

## 2025-04-07 RX ORDER — LOSARTAN POTASSIUM 100 MG/1
TABLET ORAL
Qty: 90 TABLET | Refills: 1 | Status: SHIPPED | OUTPATIENT
Start: 2025-04-07

## 2025-04-07 RX ORDER — ATORVASTATIN CALCIUM 20 MG/1
20 TABLET, FILM COATED ORAL DAILY
Qty: 90 TABLET | Refills: 1 | Status: SHIPPED | OUTPATIENT
Start: 2025-04-07

## 2025-04-07 NOTE — TELEPHONE ENCOUNTER
Last OV: 3/25/2025  Last RX: 10/8/2024   Next scheduled apt: 4/28/2025    Surescripts requesting refill

## 2025-04-28 ENCOUNTER — OFFICE VISIT (OUTPATIENT)
Dept: FAMILY MEDICINE CLINIC | Age: 75
End: 2025-04-28
Payer: MEDICARE

## 2025-04-28 VITALS
DIASTOLIC BLOOD PRESSURE: 82 MMHG | OXYGEN SATURATION: 99 % | HEIGHT: 62 IN | HEART RATE: 95 BPM | BODY MASS INDEX: 33.68 KG/M2 | SYSTOLIC BLOOD PRESSURE: 138 MMHG | WEIGHT: 183 LBS

## 2025-04-28 DIAGNOSIS — F32.A ANXIETY AND DEPRESSION: ICD-10-CM

## 2025-04-28 DIAGNOSIS — F41.9 ANXIETY AND DEPRESSION: ICD-10-CM

## 2025-04-28 DIAGNOSIS — Z79.4 DIABETES MELLITUS DUE TO UNDERLYING CONDITION WITH COMPLICATION, WITH LONG-TERM CURRENT USE OF INSULIN (HCC): Primary | ICD-10-CM

## 2025-04-28 DIAGNOSIS — E08.8 DIABETES MELLITUS DUE TO UNDERLYING CONDITION WITH COMPLICATION, WITH LONG-TERM CURRENT USE OF INSULIN (HCC): Primary | ICD-10-CM

## 2025-04-28 PROCEDURE — 1159F MED LIST DOCD IN RCRD: CPT | Performed by: NURSE PRACTITIONER

## 2025-04-28 PROCEDURE — 1123F ACP DISCUSS/DSCN MKR DOCD: CPT | Performed by: NURSE PRACTITIONER

## 2025-04-28 PROCEDURE — 1160F RVW MEDS BY RX/DR IN RCRD: CPT | Performed by: NURSE PRACTITIONER

## 2025-04-28 PROCEDURE — 99213 OFFICE O/P EST LOW 20 MIN: CPT | Performed by: NURSE PRACTITIONER

## 2025-04-28 PROCEDURE — 3075F SYST BP GE 130 - 139MM HG: CPT | Performed by: NURSE PRACTITIONER

## 2025-04-28 PROCEDURE — 3079F DIAST BP 80-89 MM HG: CPT | Performed by: NURSE PRACTITIONER

## 2025-04-28 ASSESSMENT — ENCOUNTER SYMPTOMS
EYES NEGATIVE: 1
SHORTNESS OF BREATH: 0
WHEEZING: 0
SORE THROAT: 0
CHEST TIGHTNESS: 0
COUGH: 0

## 2025-04-28 NOTE — PATIENT INSTRUCTIONS
THANK YOU FOR TRUSTING US WITH YOUR HEALTHCARE !!    The associates caring for you today were:    Family Practice Provider: Krupa CORTEZ-CYNDI    Clinical Team Nurse: Elenita Sierra LPN    Clinical Team Medical Assistant: Estella Kirby MA    Our goal is to provide you with EXCEPTIONAL patient care, and we strive to do this for EVERY patient, EVERY visit. Since we care about you and your experience, you may receive a patient satisfaction survey from Gary Mercedes by postal mail/email/text. We truly welcome your feedback and ask that you complete the survey to let us know how we are doing.    Important Numbers:  [facility] phone [phone]   [place] Office Nurse/MA Line: [phone]  Fax  [phone]   Central [phone]  Billing questions: [phone]  Medical Records Request: [phone]    Manage your healthcare  with [facility]. To activate your account, visit [email or web address]   DIGITAL scheduling available now through my chart.  Schedule your next appointment at your convenience through your my chart.       [place] Office Hours:  Monday: [place] office location 8-5 ([phone]) Offering additional late hours the first Monday of the month until 7 pm.   Tuesday: 8: :  812 Noon, closed  of the month   : 7:30-4:30   SURVEY:    You may be receiving a survey from Gary Mercedes regarding your visit today.    Please complete the survey to enable us to provide the highest quality of care to you and your family.    If you cannot score us a very good on any question, please call the office to discuss how we could have made your experience a very good one.    Thank you.

## 2025-04-28 NOTE — PROGRESS NOTES
HPI Notes    Name: Krupa Abrams  : 1950         Chief Complaint:     Chief Complaint   Patient presents with    Diabetes     1 month f/u. Pt presents today with BS readings       History of Present Illness:        HPI    New onset lantus insulin 10 pm at night.  6 units    Hemoglobin A1C   Date Value Ref Range Status   2025 9.2 % Final     Patient is here today following up starting insulin for his type 2 diabetes which is uncontrolled she has done very well with her injection sites and is aware of how to use the pen okay but she is priming it with each injection I had let her know that only needs to be with a new pen  She has been okay with that even though she was nervous with starting and has had a good result of almost a 10 pound weight loss improvement in her energy and blood sugar levels both    Is trying to learn portion sizes better and has brought a good diabetes log today    Blood sugars check average twice a day     Alternating sites , not using arm or thigh   Hungry a lot , less urination, thirst drinking well   Discussed measuring her fruits and vegetables with measuring cup like half cup so she knows her portion sizes and to be a little more calm in her eating habits    She continues on Janumet also in addition to this medication in which she is getting through the indigent program      Past Medical History:     Past Medical History:   Diagnosis Date    Anxiety     Hyperlipidemia     Hypertension     onset early 40s    Lichen simplex 2016    shoulders and right leg    Osteopenia 2011    femoral necks jean -1.9, -1.8    Postmenopausal osteoporosis of multiple sites 2023    hips -2.6      Reviewed all health maintenance requirements and ordered appropriate tests  Health Maintenance Due   Topic Date Due    Shingles vaccine (2 of 3) 2014    Diabetic retinal exam  2023    Annual Wellness Visit (Medicare Advantage)  2025    COVID-19 Vaccine ( season)

## 2025-06-03 ENCOUNTER — TELEPHONE (OUTPATIENT)
Dept: PRIMARY CARE CLINIC | Age: 75
End: 2025-06-03

## 2025-06-25 ENCOUNTER — TELEPHONE (OUTPATIENT)
Dept: FAMILY MEDICINE CLINIC | Age: 75
End: 2025-06-25

## 2025-06-25 NOTE — TELEPHONE ENCOUNTER
----- Message from Tien DOMINGUEZ sent at 6/25/2025 11:47 AM EDT -----  Regarding: ECC Message to Provider  ECC Message to Provider    Relationship to Patient: Self     Additional Information : Pt wanted to confirm if she needed to have a blood work for her Annual Visit this July 7 at 11AM.   --------------------------------------------------------------------------------------------------------------------------    Call Back Information: OK to leave message on voicemail  Preferred Call Back Number: Phone tel:[phone]

## 2025-07-05 DIAGNOSIS — F32.A ANXIETY AND DEPRESSION: ICD-10-CM

## 2025-07-05 DIAGNOSIS — F41.9 ANXIETY AND DEPRESSION: ICD-10-CM

## 2025-07-07 ENCOUNTER — HOSPITAL ENCOUNTER (OUTPATIENT)
Age: 75
Discharge: HOME | End: 2025-07-07
Payer: MEDICARE

## 2025-07-07 ENCOUNTER — OFFICE VISIT (OUTPATIENT)
Dept: FAMILY MEDICINE CLINIC | Age: 75
End: 2025-07-07

## 2025-07-07 VITALS
WEIGHT: 178 LBS | DIASTOLIC BLOOD PRESSURE: 82 MMHG | HEART RATE: 87 BPM | BODY MASS INDEX: 32.76 KG/M2 | OXYGEN SATURATION: 99 % | HEIGHT: 62 IN | SYSTOLIC BLOOD PRESSURE: 130 MMHG

## 2025-07-07 DIAGNOSIS — E08.8 DIABETES MELLITUS DUE TO UNDERLYING CONDITION WITH COMPLICATION, WITH LONG-TERM CURRENT USE OF INSULIN (HCC): ICD-10-CM

## 2025-07-07 DIAGNOSIS — Z00.00 MEDICARE ANNUAL WELLNESS VISIT, SUBSEQUENT: Primary | ICD-10-CM

## 2025-07-07 DIAGNOSIS — I10 ESSENTIAL HYPERTENSION, BENIGN: ICD-10-CM

## 2025-07-07 DIAGNOSIS — Z79.4 DIABETES MELLITUS DUE TO UNDERLYING CONDITION WITH COMPLICATION, WITH LONG-TERM CURRENT USE OF INSULIN (HCC): ICD-10-CM

## 2025-07-07 DIAGNOSIS — E11.9 TYPE 2 DIABETES, HBA1C GOAL < 7% (HCC): ICD-10-CM

## 2025-07-07 DIAGNOSIS — E78.2 MIXED HYPERLIPIDEMIA: ICD-10-CM

## 2025-07-07 DIAGNOSIS — J01.00 ACUTE NON-RECURRENT MAXILLARY SINUSITIS: ICD-10-CM

## 2025-07-07 DIAGNOSIS — F41.9 ANXIETY AND DEPRESSION: ICD-10-CM

## 2025-07-07 DIAGNOSIS — F32.A ANXIETY AND DEPRESSION: ICD-10-CM

## 2025-07-07 LAB
ALBUMIN SERPL-MCNC: 4 G/DL (ref 3.5–5.2)
ALBUMIN/GLOB SERPL: 1.3 {RATIO} (ref 1–2.5)
ALP SERPL-CCNC: 98 U/L (ref 35–104)
ALT SERPL-CCNC: 9 U/L (ref 5–33)
ANION GAP SERPL CALCULATED.3IONS-SCNC: 12 MMOL/L (ref 9–17)
AST SERPL-CCNC: 14 U/L
BASOPHILS # BLD: 0.03 K/UL (ref 0–0.2)
BASOPHILS NFR BLD: 0 % (ref 0–2)
BILIRUB SERPL-MCNC: 0.3 MG/DL (ref 0.3–1.2)
BUN SERPL-MCNC: 15 MG/DL (ref 8–23)
CALCIUM SERPL-MCNC: 9.4 MG/DL (ref 8.6–10.4)
CHLORIDE SERPL-SCNC: 108 MMOL/L (ref 98–107)
CHOLEST SERPL-MCNC: 137 MG/DL (ref 0–199)
CHOLESTEROL/HDL RATIO: 3.6
CO2 SERPL-SCNC: 21 MMOL/L (ref 20–31)
CREAT SERPL-MCNC: 0.7 MG/DL (ref 0.5–0.9)
EOSINOPHIL # BLD: 0.65 K/UL (ref 0–0.4)
EOSINOPHILS RELATIVE PERCENT: 6 % (ref 0–5)
ERYTHROCYTE [DISTWIDTH] IN BLOOD BY AUTOMATED COUNT: 14.1 % (ref 12.1–15.2)
GFR, ESTIMATED: >90 ML/MIN/1.73M2
GLUCOSE SERPL-MCNC: 128 MG/DL (ref 70–99)
HBA1C MFR BLD: 6.5 %
HCT VFR BLD AUTO: 39.9 % (ref 36–46)
HDLC SERPL-MCNC: 38 MG/DL
HGB BLD-MCNC: 12.7 G/DL (ref 12–16)
IMM GRANULOCYTES # BLD AUTO: 0.03 K/UL (ref 0–0.3)
IMM GRANULOCYTES NFR BLD: 0 % (ref 0–5)
LDLC SERPL CALC-MCNC: 70 MG/DL (ref 0–100)
LYMPHOCYTES NFR BLD: 2.78 K/UL (ref 1–4.8)
LYMPHOCYTES RELATIVE PERCENT: 24 % (ref 15–40)
MCH RBC QN AUTO: 24.1 PG (ref 26–34)
MCHC RBC AUTO-ENTMCNC: 31.8 G/DL (ref 31–37)
MCV RBC AUTO: 75.6 FL (ref 80–100)
MONOCYTES NFR BLD: 0.66 K/UL (ref 0–1)
MONOCYTES NFR BLD: 6 % (ref 4–8)
NEUTROPHILS NFR BLD: 64 % (ref 47–75)
NEUTS SEG NFR BLD: 7.7 K/UL (ref 2.5–7)
PLATELET # BLD AUTO: 401 K/UL (ref 140–450)
PMV BLD AUTO: 10.5 FL (ref 6–12)
POTASSIUM SERPL-SCNC: 4 MMOL/L (ref 3.7–5.3)
PROT SERPL-MCNC: 7.1 G/DL (ref 6.4–8.3)
RBC # BLD AUTO: 5.28 M/UL (ref 4–5.2)
SODIUM SERPL-SCNC: 141 MMOL/L (ref 135–144)
TRIGL SERPL-MCNC: 143 MG/DL
VLDLC SERPL CALC-MCNC: 29 MG/DL (ref 1–30)
WBC OTHER # BLD: 11.9 K/UL (ref 3.5–11)

## 2025-07-07 PROCEDURE — 80061 LIPID PANEL: CPT

## 2025-07-07 PROCEDURE — 80053 COMPREHEN METABOLIC PANEL: CPT

## 2025-07-07 PROCEDURE — 85025 COMPLETE CBC W/AUTO DIFF WBC: CPT

## 2025-07-07 PROCEDURE — 36415 COLL VENOUS BLD VENIPUNCTURE: CPT

## 2025-07-07 RX ORDER — VENLAFAXINE HYDROCHLORIDE 75 MG/1
75 CAPSULE, EXTENDED RELEASE ORAL DAILY
Qty: 90 CAPSULE | Refills: 1 | Status: SHIPPED | OUTPATIENT
Start: 2025-07-07

## 2025-07-07 RX ORDER — INSULIN GLARGINE 100 [IU]/ML
6 INJECTION, SOLUTION SUBCUTANEOUS NIGHTLY
Qty: 3 ADJUSTABLE DOSE PRE-FILLED PEN SYRINGE | Refills: 0 | Status: SHIPPED | OUTPATIENT
Start: 2025-07-07

## 2025-07-07 RX ORDER — AZITHROMYCIN 250 MG/1
TABLET, FILM COATED ORAL
Qty: 1 PACKET | Refills: 0 | Status: SHIPPED | OUTPATIENT
Start: 2025-07-07 | End: 2025-07-17

## 2025-07-07 RX ORDER — BUSPIRONE HYDROCHLORIDE 5 MG/1
5 TABLET ORAL 2 TIMES DAILY
Qty: 28 TABLET | Refills: 0 | Status: SHIPPED | OUTPATIENT
Start: 2025-07-07 | End: 2025-08-06

## 2025-07-07 RX ORDER — AMLODIPINE BESYLATE 10 MG/1
5 TABLET ORAL DAILY
Qty: 90 TABLET | Refills: 0 | Status: SHIPPED | OUTPATIENT
Start: 2025-07-07

## 2025-07-07 ASSESSMENT — PATIENT HEALTH QUESTIONNAIRE - PHQ9
SUM OF ALL RESPONSES TO PHQ QUESTIONS 1-9: 0
9. THOUGHTS THAT YOU WOULD BE BETTER OFF DEAD, OR OF HURTING YOURSELF: NOT AT ALL
8. MOVING OR SPEAKING SO SLOWLY THAT OTHER PEOPLE COULD HAVE NOTICED. OR THE OPPOSITE, BEING SO FIGETY OR RESTLESS THAT YOU HAVE BEEN MOVING AROUND A LOT MORE THAN USUAL: NOT AT ALL
SUM OF ALL RESPONSES TO PHQ QUESTIONS 1-9: 0
SUM OF ALL RESPONSES TO PHQ QUESTIONS 1-9: 0
7. TROUBLE CONCENTRATING ON THINGS, SUCH AS READING THE NEWSPAPER OR WATCHING TELEVISION: NOT AT ALL
3. TROUBLE FALLING OR STAYING ASLEEP: NOT AT ALL
4. FEELING TIRED OR HAVING LITTLE ENERGY: NOT AT ALL
2. FEELING DOWN, DEPRESSED OR HOPELESS: NOT AT ALL
1. LITTLE INTEREST OR PLEASURE IN DOING THINGS: NOT AT ALL
10. IF YOU CHECKED OFF ANY PROBLEMS, HOW DIFFICULT HAVE THESE PROBLEMS MADE IT FOR YOU TO DO YOUR WORK, TAKE CARE OF THINGS AT HOME, OR GET ALONG WITH OTHER PEOPLE: NOT DIFFICULT AT ALL
5. POOR APPETITE OR OVEREATING: NOT AT ALL
SUM OF ALL RESPONSES TO PHQ QUESTIONS 1-9: 0
6. FEELING BAD ABOUT YOURSELF - OR THAT YOU ARE A FAILURE OR HAVE LET YOURSELF OR YOUR FAMILY DOWN: NOT AT ALL

## 2025-07-07 NOTE — PATIENT INSTRUCTIONS
14.5  © 2024-2025 [employer].   Care instructions adapted under license by [employer]. If you have questions about a medical condition or this instruction, always ask your healthcare professional. [employer], disclaims any warranty or liability for your use of this information.    Personalized Preventive Plan for Krupa Abrams - 7/7/2025  Medicare offers a range of preventive health benefits. Some of the tests and screenings are paid in full while other may be subject to a deductible, co-insurance, and/or copay.  Some of these benefits include a comprehensive review of your medical history including lifestyle, illnesses that may run in your family, and various assessments and screenings as appropriate.  After reviewing your medical record and screening and assessments performed today your provider may have ordered immunizations, labs, imaging, and/or referrals for you.  A list of these orders (if applicable) as well as your Preventive Care list are included within your After Visit Summary for your review.

## 2025-07-07 NOTE — PROGRESS NOTES
Medicare Annual Wellness Visit    Krupa Abrams is here for Medicare AWV (HTN, DM)    Assessment & Plan   Medicare annual wellness visit, subsequent  Type 2 diabetes, HbA1c goal < 7% ([facility])  -     POCT glycosylated hemoglobin (Hb A1C)  -     insulin glargine (LANTUS SOLOSTAR) 100 UNIT/ML injection pen; Inject 6 Units into the skin nightly For diabetes, Disp-3 Adjustable Dose Pre-filled Pen Syringe, R-0Start of insulin , glyco 9.2Normal  Anxiety and depression  -     venlafaxine (EFFEXOR XR) 75 MG extended release capsule; Take 1 capsule by mouth daily, Disp-90 capsule, R-1We are requesting a refill now to prepare for the patient's [employer] and it will go on file until they are due. Thank you!Normal  -     busPIRone (BUSPAR) 5 MG tablet; Take 1 tablet by mouth 2 times daily For anxiety with upcoming travel, Disp-28 tablet, R-0Normal  Essential hypertension, benign  -     amLODIPine (NORVASC) 10 MG tablet; Take 0.5 tablets by mouth daily Hypertension, Disp-90 tablet, R-0wt loss, great blood sugar control A1c is 6.5 , 178#, will decrease amlodipine to 5 mg dose, pt will cut in 1/2 , do not refill any further 10mg tabs will change to 5 mg once current supply used.Normal  Mixed hyperlipidemia  Acute non-recurrent maxillary sinusitis  -     azithromycin (ZITHROMAX Z-ONEL) 250 MG tablet; Two tablets by mouth the first day  then one tablet daily for 4 days. For sinusitis, Disp-1 packet, R-0Normal       Return in 6 months (on 1/7/2026) for diabetes.     Subjective   The following acute and/or chronic problems were also addressed today:  Doing okay , since starting basal insulin has lost weight, improved energy and also good sugar control     Pt c/o congestion sinus drainage starting with cough, pt planned trip out of town coming  up soon with boyfriend .   Feels that     Anxiety controlled with effexor asking for additional med due to fear of bridges and car travel , will try buspar 2 x daily for short time.     Type 2 DM

## 2025-07-08 RX ORDER — VENLAFAXINE HYDROCHLORIDE 75 MG/1
75 CAPSULE, EXTENDED RELEASE ORAL DAILY
Qty: 90 CAPSULE | Refills: 1 | Status: SHIPPED | OUTPATIENT
Start: 2025-07-08

## 2025-07-10 ENCOUNTER — TELEPHONE (OUTPATIENT)
Dept: FAMILY MEDICINE CLINIC | Age: 75
End: 2025-07-10

## 2025-07-10 NOTE — TELEPHONE ENCOUNTER
Patient has run out of Janumet - she does have Metformin 1000 mg - wants to know if she can take that until a new script can be sent in for her - states that she gets it through some program that she doesn't have to pay for but they need a new script

## 2025-07-10 NOTE — TELEPHONE ENCOUNTER
Called [facility] pt had refills, pharmacy is getting the medication ready and will send to pt's address         Pt notified the pharmacy is filling Rx.

## 2025-07-10 NOTE — TELEPHONE ENCOUNTER
Please inform pt she does get her Janumet through [employer] pt assistance program [phone]    She is approved through Jan 1, 2026   Next application time due in Dec 2025    She must call the above [employer] number each time to ask for the next 3 month shipment of her Janumet medication. I called today and asked them to ship she will receive that in the next 7-10 business days    In the meantime okay to do plain metformin 1000 mg tab once a day in am only     Then when shipment comes resume the janumet please.     Thanks  Krupa Reece who is triage nurse today please convey info to pt or forward to nurse.

## 2025-07-12 RX ORDER — SITAGLIPTIN AND METFORMIN HYDROCHLORIDE 1000; 50 MG/1; MG/1
1 TABLET, FILM COATED ORAL
Qty: 180 TABLET | Refills: 3 | Status: SHIPPED | OUTPATIENT
Start: 2025-07-12

## 2025-07-29 ENCOUNTER — TELEPHONE (OUTPATIENT)
Dept: DIABETES SERVICES | Age: 75
End: 2025-07-29

## 2025-07-29 NOTE — PROGRESS NOTES
Three month diabetes education follow-up letter sent on 7/17 and returned on 7/26/25.      Prabha Salinas RN  [facility]  Diabetes clinic educator  7/29/2025  12:17 PM

## 2025-08-18 ENCOUNTER — OFFICE VISIT (OUTPATIENT)
Dept: FAMILY MEDICINE CLINIC | Age: 75
End: 2025-08-18

## 2025-08-18 VITALS
BODY MASS INDEX: 33.31 KG/M2 | DIASTOLIC BLOOD PRESSURE: 72 MMHG | SYSTOLIC BLOOD PRESSURE: 162 MMHG | HEART RATE: 80 BPM | WEIGHT: 181 LBS | OXYGEN SATURATION: 99 % | HEIGHT: 62 IN

## 2025-08-18 DIAGNOSIS — H55.09 NYSTAGMUS, VESTIBULAR: ICD-10-CM

## 2025-08-18 DIAGNOSIS — S00.83XA FOREHEAD CONTUSION, INITIAL ENCOUNTER: ICD-10-CM

## 2025-08-18 DIAGNOSIS — H81.90 NYSTAGMUS, VESTIBULAR: ICD-10-CM

## 2025-08-18 DIAGNOSIS — S09.90XA TRAUMATIC INJURY OF HEAD, INITIAL ENCOUNTER: ICD-10-CM

## 2025-08-18 DIAGNOSIS — S05.11XA ECCHYMOSIS OF RIGHT EYE, INITIAL ENCOUNTER: ICD-10-CM

## 2025-08-18 DIAGNOSIS — H81.13 BPPV (BENIGN PAROXYSMAL POSITIONAL VERTIGO), BILATERAL: ICD-10-CM

## 2025-08-18 DIAGNOSIS — M53.3 ACUTE COCCYGEAL PAIN: ICD-10-CM
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
## 2025-08-18 DIAGNOSIS — S16.1XXA NECK STRAIN, INITIAL ENCOUNTER: ICD-10-CM

## 2025-08-18 RX ORDER — GABAPENTIN 100 MG/1
100 CAPSULE ORAL 2 TIMES DAILY
Qty: 28 CAPSULE | Refills: 0 | Status: SHIPPED | OUTPATIENT
Start: 2025-08-18 | End: 2025-09-01

## 2025-08-18 RX ORDER — MECLIZINE HCL 12.5 MG 12.5 MG/1
12.5 TABLET ORAL 3 TIMES DAILY PRN
Qty: 30 TABLET | Refills: 0 | Status: SHIPPED | OUTPATIENT
Start: 2025-08-18 | End: 2025-08-28

## 2025-08-20 ENCOUNTER — HOSPITAL ENCOUNTER (OUTPATIENT)
Dept: PHYSICAL THERAPY | Age: 75
Setting detail: THERAPIES SERIES
Discharge: HOME | End: 2025-08-20
Payer: MEDICARE

## 2025-08-20 PROCEDURE — 97162 PT EVAL MOD COMPLEX 30 MIN: CPT

## 2025-08-20 PROCEDURE — 97140 MANUAL THERAPY 1/> REGIONS: CPT

## 2025-08-20 ASSESSMENT — PAIN SCALES - GENERAL: PAINLEVEL_OUTOF10: 5

## 2025-08-21 ENCOUNTER — HOSPITAL ENCOUNTER (OUTPATIENT)
Dept: CT IMAGING | Age: 75
Discharge: HOME | End: 2025-08-23
Payer: MEDICARE

## 2025-08-21 ENCOUNTER — HOSPITAL ENCOUNTER (OUTPATIENT)
Dept: GENERAL RADIOLOGY | Age: 75
Discharge: HOME | End: 2025-08-23
Payer: MEDICARE

## 2025-08-21 DIAGNOSIS — S00.83XA FOREHEAD CONTUSION, INITIAL ENCOUNTER: ICD-10-CM
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
## 2025-08-21 DIAGNOSIS — S09.90XA TRAUMATIC INJURY OF HEAD, INITIAL ENCOUNTER: ICD-10-CM

## 2025-08-21 DIAGNOSIS — H55.09 NYSTAGMUS, VESTIBULAR: ICD-10-CM

## 2025-08-21 DIAGNOSIS — H81.90 NYSTAGMUS, VESTIBULAR: ICD-10-CM

## 2025-08-21 DIAGNOSIS — H81.13 BPPV (BENIGN PAROXYSMAL POSITIONAL VERTIGO), BILATERAL: ICD-10-CM

## 2025-08-21 DIAGNOSIS — S05.11XA ECCHYMOSIS OF RIGHT EYE, INITIAL ENCOUNTER: ICD-10-CM

## 2025-08-21 DIAGNOSIS — S16.1XXA NECK STRAIN, INITIAL ENCOUNTER: ICD-10-CM

## 2025-08-21 PROCEDURE — 72220 X-RAY EXAM SACRUM TAILBONE: CPT

## 2025-08-21 PROCEDURE — 72125 CT NECK SPINE W/O DYE: CPT

## 2025-08-21 PROCEDURE — 70450 CT HEAD/BRAIN W/O DYE: CPT

## 2025-08-21 ASSESSMENT — ENCOUNTER SYMPTOMS
RHINORRHEA: 0
EYES NEGATIVE: 1
BACK PAIN: 1
SORE THROAT: 0
SHORTNESS OF BREATH: 0
COUGH: 0
CHEST TIGHTNESS: 0
WHEEZING: 0

## 2025-08-25 ENCOUNTER — HOSPITAL ENCOUNTER (OUTPATIENT)
Dept: PHYSICAL THERAPY | Age: 75
Setting detail: THERAPIES SERIES
Discharge: HOME | End: 2025-08-25
Payer: MEDICARE

## 2025-08-25 PROCEDURE — 97112 NEUROMUSCULAR REEDUCATION: CPT

## 2025-08-25 PROCEDURE — 97110 THERAPEUTIC EXERCISES: CPT

## 2025-08-27 ENCOUNTER — HOSPITAL ENCOUNTER (OUTPATIENT)
Dept: PHYSICAL THERAPY | Age: 75
Setting detail: THERAPIES SERIES
Discharge: HOME | End: 2025-08-27
Payer: MEDICARE

## 2025-08-27 PROCEDURE — 97112 NEUROMUSCULAR REEDUCATION: CPT

## 2025-08-27 ASSESSMENT — PAIN SCALES - GENERAL: PAINLEVEL_OUTOF10: 3

## 2025-09-03 ENCOUNTER — HOSPITAL ENCOUNTER (OUTPATIENT)
Dept: PHYSICAL THERAPY | Age: 75
Setting detail: THERAPIES SERIES
Discharge: HOME | End: 2025-09-03
Payer: MEDICARE

## 2025-09-03 PROCEDURE — 97110 THERAPEUTIC EXERCISES: CPT

## 2025-09-03 PROCEDURE — 97112 NEUROMUSCULAR REEDUCATION: CPT

## 2025-09-03 ASSESSMENT — PAIN SCALES - GENERAL: PAINLEVEL_OUTOF10: 3

## 2025-09-05 ENCOUNTER — HOSPITAL ENCOUNTER (OUTPATIENT)
Dept: PHYSICAL THERAPY | Age: 75
Setting detail: THERAPIES SERIES
Discharge: HOME | End: 2025-09-05
Payer: MEDICARE

## 2025-09-05 PROCEDURE — 97110 THERAPEUTIC EXERCISES: CPT

## 2025-09-05 PROCEDURE — 97112 NEUROMUSCULAR REEDUCATION: CPT

## 2025-09-05 ASSESSMENT — PAIN SCALES - GENERAL: PAINLEVEL_OUTOF10: 1

## (undated) DEVICE — JELLY LUBRICATING 4OZ FLIP TOP TB E Z

## (undated) DEVICE — GOWN,AURORA,NONRNF,XL,30/CS: Brand: MEDLINE

## (undated) DEVICE — ELECTRODE ES AD CRD L15FT DISP FOR PT BELOW 30LB REM

## (undated) DEVICE — Device: Brand: SINGLE USE INJECTOR

## (undated) DEVICE — GLOVE ORTHO 8   MSG9480

## (undated) DEVICE — FORCEPS BX L240CM JAW DIA2.8MM L CAP W/ NDL MIC MESH TOOTH

## (undated) DEVICE — 4-PORT MANIFOLD: Brand: NEPTUNE 2

## (undated) DEVICE — TRAP SUCT POLYPECTOMY ST 4 CHMBR

## (undated) DEVICE — ENDO KIT W/SYRINGE: Brand: MEDLINE INDUSTRIES, INC.

## (undated) DEVICE — MEDI-VAC NON-CONDUCTIVE SUCTION TUBING 6MM X 6.1M (20 FT.) L: Brand: CARDINAL HEALTH

## (undated) DEVICE — FORCEPS BX L240CM JAW DIA2.2MM RAD JAW 4 HOT DISP

## (undated) DEVICE — Device: Brand: DISPOSABLE ELECTROSURGICAL SNARE

## (undated) DEVICE — Device: Brand: DEFENDO VALVE AND CONNECTOR KIT